# Patient Record
Sex: MALE | Race: WHITE | NOT HISPANIC OR LATINO | Employment: OTHER | URBAN - METROPOLITAN AREA
[De-identification: names, ages, dates, MRNs, and addresses within clinical notes are randomized per-mention and may not be internally consistent; named-entity substitution may affect disease eponyms.]

---

## 2017-01-02 ENCOUNTER — OFFICE VISIT (OUTPATIENT)
Dept: DERMATOLOGY | Facility: CLINIC | Age: 67
End: 2017-01-02
Payer: COMMERCIAL

## 2017-01-02 VITALS — DIASTOLIC BLOOD PRESSURE: 73 MMHG | SYSTOLIC BLOOD PRESSURE: 134 MMHG | HEART RATE: 90 BPM | OXYGEN SATURATION: 94 %

## 2017-01-02 DIAGNOSIS — D48.5 NEOPLASM OF UNCERTAIN BEHAVIOR OF SKIN: Primary | ICD-10-CM

## 2017-01-02 DIAGNOSIS — C44.329 SQUAMOUS CELL CARCINOMA OF SKIN OF LEFT CHEEK: ICD-10-CM

## 2017-01-02 PROCEDURE — 11100 HC BIOPSY SKIN/SUBQ/MUC MEM, SINGLE LESION: CPT | Mod: 59 | Performed by: DERMATOLOGY

## 2017-01-02 PROCEDURE — 88305 TISSUE EXAM BY PATHOLOGIST: CPT | Mod: 90 | Performed by: DERMATOLOGY

## 2017-01-02 PROCEDURE — 17311 MOHS 1 STAGE H/N/HF/G: CPT | Performed by: DERMATOLOGY

## 2017-01-02 PROCEDURE — 99000 SPECIMEN HANDLING OFFICE-LAB: CPT | Performed by: DERMATOLOGY

## 2017-01-02 PROCEDURE — 13132 CMPLX RPR F/C/C/M/N/AX/G/H/F: CPT | Performed by: DERMATOLOGY

## 2017-01-02 NOTE — MR AVS SNAPSHOT
After Visit Summary   1/2/2017    Jarrod Lewis    MRN: [id number]           Patient Information     Date Of Birth          1950        Visit Information        Provider Department      1/2/2017 8:00 AM Tatyaan Sepulveda MD [facility]        Today's Diagnoses     Neoplasm of uncertain behavior of skin    -  1       Care Instructions    Excision Wound Care Instructions  I will experience scar, altered skin color, bleeding, swelling, pain, crusting and redness. I may experience altered sensation. Risks are excessive bleeding, infection, muscle weakness, thick (hypertrophic or keloidal) scar, and recurrence,. A second procedure may be recommended to obtain the best cosmetic or functional result.  Possible complications of any surgical procedure are bleeding, infection, scarring, alteration in skin color and sensation, muscle weakness in the area, wound dehiscence or seperation, or recurrence of the lesion or disease. On occasion, after healing, a secondary procedure or revision may be recommended in order to obtain the best cosmetic or functional result.   After your surgery, a pressure bandage will be placed over the area that has sutures. This will help prevent bleeding. Please follow these instructions, as they will help you to prevent complications as your wound heals.    Do Not use hydrogen peroxide.  The sutures do not need to be removed.  For the First 48 hours After Surgery:  1. Leave the pressure bandage on and keep it dry. If it should come loose, you may retape it, but do not take it off.  2. Relax and take it easy. Do not do any vigorous exercise, heavy lifting, or bending forward. This could cause the wound to bleed.  3. Post-operative pain is usually mild. You may take plain or extra strength Tylenol every 4 hours as needed (do not take more than 4,000mg in one day). Do not take any medicine that contains aspirin, ibuprofen or motrin unless you have been  recommended these by a doctor.  Avoid alcohol and vitamin E as these may increase your tendency to bleed.  4. You may put an ice pack around the bandaged area for 20 minutes every 2-3 hours. This may help reduce swelling, bruising, and pain. Make sure the ice pack is waterproof so that the pressure bandage does not get wet.   5. You may see a small amount of drainage or blood on your pressure bandage. This is normal. However, if drainage or bleeding continues or saturates the bandage, you will need to apply firm pressure over the bandage with a washcloth for 15 minutes. If bleeding continues after applying pressure for 15 minutes then go to the nearest emergency room.  48 Hours After Surgery  Carefully remove the bandage and start daily wound care and dressing changes. You may also now shower and get the wound wet. Wash wound with a mild soap and water.  Use caution when washing the wound. Be gentle and do not let the forceful shower stream hit the wound directly.  PAT dry.  Daily Wound Care:  1. Wash wound with a mild soap and water.  Use caution when washing the wound, be gentle and do not let the forceful shower stream hit the wound directly.  2. PAT DRY.  3. Apply Vaseline (from a new container or tube) over the suture line with a Q-tip. It is very important to keep the wound continuously moist, as wounds heal best in a moist environment.  4.  Keep the site covered until healed, you can cover it with a Telfa (non-stick) dressing and tape or a band-aid.    5. If you are unable to keep wound covered, you must apply Vaseline every 2 - 3 hours (while awake) to ensure it is being kept moist for optimal healing. A dressing overnight is recommended to keep the area moist.   Call Us If:  1. You have pain that is not controlled with Tylenol.  2. You have signs or symptoms of an infection, such as: fever over 100 degrees F, redness, warmth, or foul-smelling or yellow/creamy drainage from the wound.  Who should I call with  questions?    [facility]: [phone]     [facility]: [phone]    For urgent needs outside of business hours call the [facility] at [phone] and ask for the dermatology resident on call        Wound Care After a Biopsy ( left ear)    What is a skin biopsy?  A skin biopsy allows the doctor to examine a very small piece of tissue under the microscope to determine the diagnosis and the best treatment for the skin condition. A local anesthetic (numbing medicine)  is injected with a very small needle into the skin area to be tested. A small piece of skin is taken from the area. Sometimes a suture (stitch) is used.     What are the risks of a skin biopsy?  I will experience scar, bleeding, swelling, pain, crusting and redness. I may experience incomplete removal or recurrence. Risks of this procedure are excessive bleeding, bruising, infection, nerve damage, numbness, thick (hypertrophic or keloidal) scar and non-diagnostic biopsy.    How should I care for my wound for the first 24 hours?    Keep the wound dry and covered for 24 hours    If it bleeds, hold direct pressure on the area for 15 minutes. If bleeding does not stop then go to the emergency room    Avoid strenuous exercise the first 1-2 days or as your doctor instructs you    How should I care for the wound after 24 hours?    After 24 hours, remove the bandage    You may bathe or shower as normal    If you had a scalp biopsy, you can shampoo as usual and can use shower water to clean the biopsy site daily    Clean the wound twice a day with gentle soap and water    Do not scrub, be gentle    Apply white petroleum/Vaseline after cleaning the wound with a cotton swab or a clean finger, and keep the site covered with a Bandaid /bandage. Bandages are not necessary with a scalp biopsy    If you are unable to cover the site with a Bandaid /bandage, re-apply ointment 2-3 times a day to keep  the site moist. Moisture will help with healing    Avoid strenuous activity for first 1-2 days    Avoid lakes, rivers, pools, and oceans until the stitches are removed or the site is healed    How do I clean my wound?    Wash hands for 15 minutes with soap or use hand  before all wound care    Clean the wound with gentle soap and water    Apply white petroleum/Vaseline  to wound after it is clean    Replace the Bandaid /bandage to keep the wound covered for the first few days or as instructed by your doctor    If you had a scalp biopsy, warm shower water to the area on a daily basis should suffice    What should I use to clean my wound?     Cotton-tipped applicators (Qtips )    White petroleum jelly (Vaseline ). Use a clean new container and use Q-tips to apply.    Bandaids   as needed    Gentle soap     How should I care for my wound long term?    Do not get your wound dirty    Keep up with wound care for one week or until the area is healed.    A small scab will form and fall off by itself when the area is completely healed. The area will be red and will become pink in color as it heals. Sun protection is very important for how your scar will turn out. Sunscreen with an SPF 30 or greater is recommended once the area is healed.    You should have some soreness but it should be mild and slowly go away over several days. Talk to your doctor about using tylenol for pain,    When should I call my doctor?  If you have increased:     Pain or swelling    Pus or drainage (clear or slightly yellow drainage is ok)    Temperature over 100F    Spreading redness or warmth around wound    When will I hear about my results?  The biopsy results can take 2-3 weeks to come back. The clinic will call you with the results, send you a [employer] message, or have you schedule a follow-up clinic or phone time to discuss the results. Contact our clinics if you do not hear from us in 3 weeks.     Who should I call with  questions?    [facility]: [phone]     [facility]: [phone]    For urgent needs outside of business hours call the [facility] at [phone] and ask for the dermatology resident on call          Follow-ups after your visit        Your next 10 appointments already scheduled     Jul 17, 2017  2:00 PM   Return Visit with Tatyana Sepulveda MD   [facility]    [address] [place] [id number] [phone]              Who to contact     If you have questions or need follow up information about today's clinic visit or your schedule please contact [facility] directly at [phone].  Normal or non-critical lab and imaging results will be communicated to you by MyChart, letter or phone within 4 business days after the clinic has received the results. If you do not hear from us within 7 days, please contact the clinic through MyChart or phone. If you have a critical or abnormal lab result, we will notify you by phone as soon as possible.  Submit refill requests through [employer] or call your pharmacy and they will forward the refill request to us. Please allow 3 business days for your refill to be completed.          Additional Information About Your Visit        [employer] Information     [employer] gives you secure access to your electronic health record. If you see a primary care provider, you can also send messages to your care team and make appointments. If you have questions, please call your primary care clinic.  If you do not have a primary care provider, please call [phone] and they will assist you.      [employer] is an electronic gateway that provides easy, online access to your medical records. With [employer], you can request a clinic appointment, read your test results, renew a prescription or communicate with your care team.     To access your existing  account, please contact your [facility] or call [phone] for assistance.        Your Vitals Were     Pulse Pulse Oximetry                90 94%           Blood Pressure from Last 3 Encounters:   01/02/17 134/73   12/09/16 152/72   11/08/16 136/80    Weight from Last 3 Encounters:   12/09/16 130.545 kg (287 lb 12.8 oz)   11/08/16 129.729 kg (286 lb)   10/31/16 129.094 kg (284 lb 9.6 oz)              We Performed the Following     Surgical pathology exam        Primary Care Provider Office Phone # Fax #    Adria Cowart,  [phone]       [address]   [facility] [address]        Thank you!     Thank you for choosing [facility]  for your care. Our goal is always to provide you with excellent care. Hearing back from our patients is one way we can continue to improve our services. Please take a few minutes to complete the written survey that you may receive in the mail after your visit with us. Thank you!             Your Updated Medication List - Protect others around you: Learn how to safely use, store and throw away your medicines at www.disposemymeds.org.          This list is accurate as of: 1/2/17 11:41 AM.  Always use your most recent med list.                   Brand Name Dispense Instructions for use    acetaminophen 325 MG tablet    TYLENOL    100 tablet    Take 2 tablets (650 mg) by mouth every 4 hours as needed for mild pain or fever       garlic 150 MG Tabs tablet      Take 150 mg by mouth daily       loratadine 10 MG tablet    CLARITIN     Take 10 mg by mouth daily       Lutein 6 MG Caps      Take 1 tablet by mouth       pantoprazole 40 MG EC tablet    PROTONIX    30 tablet    Take 1 tablet (40 mg) by mouth daily Take 30-60 minutes before a meal.       SAW PALMETTO EXTRACT PO      Take 160 mg by mouth daily       sildenafil 20 MG tablet    REVATIO/VIAGRA    30 tablet    Take 1 tablet (20 mg) by mouth 3  times daily Take one pill daily as needed       triamcinolone 0.1 % ointment    KENALOG    15 g    Apply sparingly to affected area three times daily as needed.       zinc sulfate 220 MG capsule    ZINCATE     Take 1 tablet by mouth

## 2017-01-02 NOTE — NURSING NOTE
Vaseline and pressure dressing applied to Mohs site on left mandible.  Wound care instructions reviewed with patient and AVS provided.  Patient verbalized understanding.  No further questions or concerns at this time.    Skye Harper LPN

## 2017-01-02 NOTE — PATIENT INSTRUCTIONS
Excision Wound Care Instructions  I will experience scar, altered skin color, bleeding, swelling, pain, crusting and redness. I may experience altered sensation. Risks are excessive bleeding, infection, muscle weakness, thick (hypertrophic or keloidal) scar, and recurrence,. A second procedure may be recommended to obtain the best cosmetic or functional result.  Possible complications of any surgical procedure are bleeding, infection, scarring, alteration in skin color and sensation, muscle weakness in the area, wound dehiscence or seperation, or recurrence of the lesion or disease. On occasion, after healing, a secondary procedure or revision may be recommended in order to obtain the best cosmetic or functional result.   After your surgery, a pressure bandage will be placed over the area that has sutures. This will help prevent bleeding. Please follow these instructions, as they will help you to prevent complications as your wound heals.    Do Not use hydrogen peroxide.  The sutures do not need to be removed.  For the First 48 hours After Surgery:  1. Leave the pressure bandage on and keep it dry. If it should come loose, you may retape it, but do not take it off.  2. Relax and take it easy. Do not do any vigorous exercise, heavy lifting, or bending forward. This could cause the wound to bleed.  3. Post-operative pain is usually mild. You may take plain or extra strength Tylenol every 4 hours as needed (do not take more than 4,000mg in one day). Do not take any medicine that contains aspirin, ibuprofen or motrin unless you have been recommended these by a doctor.  Avoid alcohol and vitamin E as these may increase your tendency to bleed.  4. You may put an ice pack around the bandaged area for 20 minutes every 2-3 hours. This may help reduce swelling, bruising, and pain. Make sure the ice pack is waterproof so that the pressure bandage does not get wet.   5. You may see a small amount of drainage or blood on your  pressure bandage. This is normal. However, if drainage or bleeding continues or saturates the bandage, you will need to apply firm pressure over the bandage with a washcloth for 15 minutes. If bleeding continues after applying pressure for 15 minutes then go to the nearest emergency room.  48 Hours After Surgery  Carefully remove the bandage and start daily wound care and dressing changes. You may also now shower and get the wound wet. Wash wound with a mild soap and water.  Use caution when washing the wound. Be gentle and do not let the forceful shower stream hit the wound directly.  PAT dry.  Daily Wound Care:  1. Wash wound with a mild soap and water.  Use caution when washing the wound, be gentle and do not let the forceful shower stream hit the wound directly.  2. PAT DRY.  3. Apply Vaseline (from a new container or tube) over the suture line with a Q-tip. It is very important to keep the wound continuously moist, as wounds heal best in a moist environment.  4.  Keep the site covered until healed, you can cover it with a Telfa (non-stick) dressing and tape or a band-aid.    5. If you are unable to keep wound covered, you must apply Vaseline every 2 - 3 hours (while awake) to ensure it is being kept moist for optimal healing. A dressing overnight is recommended to keep the area moist.   Call Us If:  1. You have pain that is not controlled with Tylenol.  2. You have signs or symptoms of an infection, such as: fever over 100 degrees F, redness, warmth, or foul-smelling or yellow/creamy drainage from the wound.  Who should I call with questions?    [facility]: [phone]     [facility]: [phone]    For urgent needs outside of business hours call the [facility] at [phone] and ask for the dermatology resident on call        Wound Care After a Biopsy ( left ear)    What is a skin biopsy?  A skin biopsy allows the doctor to examine a  very small piece of tissue under the microscope to determine the diagnosis and the best treatment for the skin condition. A local anesthetic (numbing medicine)  is injected with a very small needle into the skin area to be tested. A small piece of skin is taken from the area. Sometimes a suture (stitch) is used.     What are the risks of a skin biopsy?  I will experience scar, bleeding, swelling, pain, crusting and redness. I may experience incomplete removal or recurrence. Risks of this procedure are excessive bleeding, bruising, infection, nerve damage, numbness, thick (hypertrophic or keloidal) scar and non-diagnostic biopsy.    How should I care for my wound for the first 24 hours?    Keep the wound dry and covered for 24 hours    If it bleeds, hold direct pressure on the area for 15 minutes. If bleeding does not stop then go to the emergency room    Avoid strenuous exercise the first 1-2 days or as your doctor instructs you    How should I care for the wound after 24 hours?    After 24 hours, remove the bandage    You may bathe or shower as normal    If you had a scalp biopsy, you can shampoo as usual and can use shower water to clean the biopsy site daily    Clean the wound twice a day with gentle soap and water    Do not scrub, be gentle    Apply white petroleum/Vaseline after cleaning the wound with a cotton swab or a clean finger, and keep the site covered with a Bandaid /bandage. Bandages are not necessary with a scalp biopsy    If you are unable to cover the site with a Bandaid /bandage, re-apply ointment 2-3 times a day to keep the site moist. Moisture will help with healing    Avoid strenuous activity for first 1-2 days    Avoid lakes, rivers, pools, and oceans until the stitches are removed or the site is healed    How do I clean my wound?    Wash hands for 15 minutes with soap or use hand  before all wound care    Clean the wound with gentle soap and water    Apply white petroleum/Vaseline   to wound after it is clean    Replace the Bandaid /bandage to keep the wound covered for the first few days or as instructed by your doctor    If you had a scalp biopsy, warm shower water to the area on a daily basis should suffice    What should I use to clean my wound?     Cotton-tipped applicators (Qtips )    White petroleum jelly (Vaseline ). Use a clean new container and use Q-tips to apply.    Bandaids   as needed    Gentle soap     How should I care for my wound long term?    Do not get your wound dirty    Keep up with wound care for one week or until the area is healed.    A small scab will form and fall off by itself when the area is completely healed. The area will be red and will become pink in color as it heals. Sun protection is very important for how your scar will turn out. Sunscreen with an SPF 30 or greater is recommended once the area is healed.    You should have some soreness but it should be mild and slowly go away over several days. Talk to your doctor about using tylenol for pain,    When should I call my doctor?  If you have increased:     Pain or swelling    Pus or drainage (clear or slightly yellow drainage is ok)    Temperature over 100F    Spreading redness or warmth around wound    When will I hear about my results?  The biopsy results can take 2-3 weeks to come back. The clinic will call you with the results, send you a [employer] message, or have you schedule a follow-up clinic or phone time to discuss the results. Contact our clinics if you do not hear from us in 3 weeks.     Who should I call with questions?    [facility]: [phone]     [facility]: [phone]    For urgent needs outside of business hours call the [facility] at [phone] and ask for the dermatology resident on call

## 2017-01-02 NOTE — NURSING NOTE
Dermatology Rooming Note    Jarrod Lewis's goals for this visit include:   Chief Complaint   Patient presents with     Procedure     Mohs left mandible  SCC        Is a scribe okay for this visit:Not applicable,     Are records needed for this visit(If yes, obtain release of information): No,      Vitals: /73 mmHg  Pulse 90  SpO2 94%    Referring Provider:  No referring provider defined for this encounter.    Skye Harper LPN

## 2017-01-02 NOTE — PROGRESS NOTES
[facility] Mohs Micrographic Surgery Note:  Chief complaint: Squamous cell carcinoma.    Jarrod Lewis is a 66 year old male who was referred by myself for Mohs excision of a Squamous cell carcinoma located on the L angle of mandible. The lesion was excised using Mohs micrographic surgery, and the defect was closed using layered linear closure technique. Please refer to the operative reports. Dissolving sutures were used. The patient was asked to return in 6 months for skin check.    Patient also noted to have a : 6mm red tender hyperkeratotic papule on the L helix, concern for SCC vs CNH.  Shave biopsy:  After discussion of benefits and risks including but not limited to bleeding/bruising, pain/swelling, infection, scar, incomplete removal, nerve damage/numbness, recurrence, and non-diagnostic biopsy, written consent, verbal consent and photographs were obtained. Time-out was performed. The area was cleaned with isopropyl alcohol. 3mL of 1% lidocaine with epinephrine was injected to obtain adequate anesthesia of the lesion on the L helix. A shave biopsy was performed. Hemostasis was achieved with aluminium chloride. Vaseline and a sterile dressing were applied. The patient tolerated the procedure and no complications were noted. The patient was provided with verbal and written post care instructions.     Tatyana Sepulveda MD    [facility]

## 2017-01-04 ENCOUNTER — MYC REFILL (OUTPATIENT)
Dept: FAMILY MEDICINE | Facility: OTHER | Age: 67
End: 2017-01-04

## 2017-01-04 DIAGNOSIS — N52.9 ERECTILE DYSFUNCTION, UNSPECIFIED ERECTILE DYSFUNCTION TYPE: ICD-10-CM

## 2017-01-04 LAB — COPATH REPORT: NORMAL

## 2017-01-04 RX ORDER — SILDENAFIL CITRATE 20 MG/1
20 TABLET ORAL 3 TIMES DAILY
Qty: 30 TABLET | Refills: 1 | Status: SHIPPED | OUTPATIENT
Start: 2017-01-04 | End: 2017-04-18

## 2017-01-04 NOTE — TELEPHONE ENCOUNTER
sildenafil      Last Written Prescription Date: 12/09/16  Last Fill Quantity: 30,  # refills: 0   Last Office Visit with G, [facility] or [facility] prescribing provider: 12/09/16

## 2017-01-04 NOTE — TELEPHONE ENCOUNTER
Message from [employer]:  Original authorizing provider: DO Paris Hernadezdiane Lewis would like a refill of the following medications:  sildenafil (REVATIO/VIAGRA) 20 MG tablet [Adria Cowart DO]    Preferred pharmacy: [id number] - [place], [place] - [address]    Comment:

## 2017-01-05 ENCOUNTER — TELEPHONE (OUTPATIENT)
Dept: DERMATOLOGY | Facility: CLINIC | Age: 67
End: 2017-01-05

## 2017-01-05 NOTE — TELEPHONE ENCOUNTER
Patient notified of results.  He verbalized understanding and agreed with the plan.  Mohs scheduled for 2/13/17.  [facility] previsit information                                                    Jarrod Lewis is a 66 year old male       Patient is being referred to Dr. 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 30 tablet 1     acetaminophen (TYLENOL) 325 MG tablet Take 2 tablets (650 mg) by mouth every 4 hours as needed for mild pain or fever 100 tablet      loratadine (CLARITIN) 10 MG tablet Take 10 mg by mouth daily       garlic 150 MG TABS Take 150 mg by mouth daily       Saw Palmetto, Serenoa repens, (SAW PALMETTO EXTRACT PO) Take 160 mg by mouth daily       triamcinolone (KENALOG) 0.1 % ointment Apply sparingly to affected area three times daily as needed. 15 g 1     Lutein 6 MG CAPS Take 1 tablet by mouth       zinc sulfate (ZINCATE) 220 MG capsule Take 1 tablet by mouth         Do you take the following medications:  Aspirin:  YES - pt notified to hold 2 weeks prior to surgery.  Ibuprofen, Advil, Motrin, Naproxen:   NO  Warfarin/Coumadin:   NO  Vitamin E:   NO  Plavix:   NO    Danielle's warts: NO   Garlic supplement:  NO   Antibiotic Prophylaxis:  NO  Fish Oil: NO    Do you have an ALLERGIC REACTION to any medications:  YES   Penicillins and Zithromax      Past Medical History                                                    Do you currently or have you previously had any of the  following conditions:       HIV/AIDS:  NO    Hepatitis:  NO    Suppressed Immune System:  NO    Autoimmune disorder/Lupus:  NO    Prolonged bleeding or Bleeding disorder:  NO    Fever blisters/herpes, cold sores:  NO    Kidney disease:  NO     CREATININE   Date Value Ref Range Status   10/21/2016 0.95 0.66 - 1.25 mg/dL Final     ]    Implanted device (pacemaker, defibrillator):  NO.      History of artificial or heart valve replacement:  NO.      Skin cancer:  YES.      Previous Mohs surgery: YES.    Organ transplant: NO.      Diabetes: NO    Pregnant: N/A    Keloids or Abnormal scars: NO    Mobility device (wheelchair, transfer difficulty): NO    Tobacco use:  NO.       History   Smoking status     Former Smoker   Smokeless tobacco     Never Used         Reviewed by:  Temi Lewis RN

## 2017-01-05 NOTE — Clinical Note
January 5, 2017        Mr. Jarrod Lewis  [address]  [facility] [address]        Dear Mr. Lewis,    You have an upcoming appointment with Dr. Sepulveda for Mohs surgery.   It is scheduled for 2/13/17 at 8:30 AM.    Your appointment will be at:  [address] [address] [place] [address]      Please arrive 5 minutes prior to your appointment at check in #4 on the 2nd level.    Please bring any insurance changes and updated medication list.  As discussed, you may be here for several hours.  Please eat breakfast before your appointment and pack a cold lunch.  Stop taking aspirin 2 weeks prior to your surgery date.  If you have any questions or concerns, please call [phone].    Sincerely,      Temi Lewis RN

## 2017-01-05 NOTE — TELEPHONE ENCOUNTER
I left a message for patient to call [facility].  Per result note:    Notes Recorded by Ancelmo Sepulveda MD on 1/5/2017 at 9:50 AM  We are writing to inform you of your test results that show:    Skin, helix, left: PLEASE SCHEDULE FOR MOHS  - Squamous cell carcinoma in situ     If you have further questions or concerns, please contact the clinic at [phone].      Sincerely,    Ancelmo Sepulveda MD    Dermatology Department of Dermatology  [facility]              1/2/17 11:34 AM     Copath Report Patient Name: WANG GONZALES   MR#: [id number]   Specimen #: [id number]   Collected: 1/2/2017   Received: 1/3/2017   Reported: 1/4/2017 15:15   Ordering Phy(s): ANCELMO SEPULVEDA     For improved result formatting, select 'View Enhanced Report Format'   under Linked Documents section.     SPECIMEN(S):   Skin, left helix     FINAL DIAGNOSIS:   Skin, helix, left:   - Squamous cell carcinoma in situ - (see description)                   Temi Lewis RN

## 2017-01-09 NOTE — PROGRESS NOTES
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  Because of its size, location and morphology, Mohs surgery was indicated.  After appropriate discussion and informed consent the patient underwent Mohs surgery using the fresh tissue technique as follows:    STAGE I:  The patient was placed on the operating room table.  The area was infiltrated with 1% lidocaine and epinephrine mixed 1:2 with 0.5% Bupivacaine. Using a #15-blade, complete excision was made around the tumor in one section.  Hemostasis was obtained by electrodessication.  A dressing was placed.  Tissue was divided into two tissue blocks which were subsequently mapped, color coded at their margins and processed in the Mohs Laboratory.  Microscopic tumor was not found in the tissue blocks.    With the lesion clear of micrographic tumor, surgery was considered complete.  The defect extended to the subcutaneous fat and measured 1.3 cm x 1.0 cm. After discussion with patient, the defect was reconstructed.      Tatyana Sepulveda MD             Test Performed at:   Dept. of Dermatology   [facility]    [facility]    [address] [place] [address]          [phone]          DERMATOLOGIC SURGERY REPORT    NAME OF PROCEDURE: COMPLEX LAYERED LINEAR CLOSURE    Surgeon: Tatyana Sepulveda  Fellow:    Resident:     DIAGNOSIS:  Status post Mohs micrographic surgical excision of a primary squamous cell carcinoma  FINAL REPAIR LENGTH:   2.8 cm    INDICATIONS:  The patient presents with a 1.3 cm x 1.0 cm defect on the left  angle of mandible following Mohs micrographic surgical excision of a primary squamous cell carcinoma.  The defect extended to the subcutaneous fat.  Based on the nature of the defect, its anatomic location, and availability of adjacent normal skin, the various reconstructive options along with the alternatives and risks of each procedure were discussed with the patient.  A complex layered linear closure was selected as the procedure which would maximally preserve both function and cosmesis and for the following reasons: 1) the defect was widely undermined to 3.5 cm x  1.5 cm; 2) the wound debeveled 360 degrees; 3) multiple deep plication and layered sutures placed; 4) wound size, depth, tension, and location.    OPERATIVE REPORT: Having obtained written informed consent for a complex layered linear closure, the patient was taken to the operating room, placed on the operating room table, and the defect was identified and the borders localized.  The area was cleansed with Hibiclens, sterilely draped, and local anesthesia was achieved with 1% Lidocaine and epinephrine mixed 1:2 with 0.5% Bupivacaine.  Residual devitalized tissue was sharply debrided from the wound bed.  The Mohs defect was debeveled and then the area was extensively and carefully undermined using blunt Metzenbaum scissors.  Hemostasis was obtained with spot electrocautery and ligation of vessels where necessary.  An initial deep plication sutures of 5-0 Vicryl sutures (total of 2 sutures) were placed in the deep, subcutaneous and fascial planes to appose the two margins of the Mohs defect.  Two redundant cutaneous columns were then removed using a #15 blade and the triangulation technique.  Hemostasis was again obtained with spot electrocautery.  The subcutaneous and dermal layers were then closed with additional 5-0 Vicryl sutures (total of 3 sutures).  The epidermis was then carefully approximated along the length of the wound using 5-0 Fast  Absorbing Gut simple running sutures.      Final repair length was 2.8 cm.  Total anesthesia used was 5.5 ml and estimated blood loss was less than 10 ml.  Appropriate post-operative wound ointment was applied to wound surface followed by Telfa and a pressure dressing.  The patient was discharged alert and ambulatory.      Tatyana Sepulveda MD             Test Performed at:   Dept. of Dermatology   [facility]    Dermatologic Surgery & Laser Center    [address] [place] [address]          [phone]

## 2017-02-06 ENCOUNTER — TELEPHONE (OUTPATIENT)
Dept: DERMATOLOGY | Facility: CLINIC | Age: 67
End: 2017-02-06

## 2017-02-06 NOTE — TELEPHONE ENCOUNTER
"Pt called to say he would like to cancel the Mohs surgery he has scheduled form 2/13/17. Pt states that he has too much going on right now. RN asked pt when he would like to reschedule and pt states that he does not know right now but he will call back to reschedule. RN reviewed with pt the type of skin cancer this is and that it is best to treat this type of cancer and pt states \"I know all about it\". RN advised pt to reschedule this mohs surgery for another date but pt declined at this time. Mohs surgery scheduled on 2/13/17 cancelled. RN will route this to Dr. Sepulveda so she is aware....Jyothi Tsang RN    "

## 2017-02-17 ENCOUNTER — TELEPHONE (OUTPATIENT)
Dept: SURGERY | Facility: OTHER | Age: 67
End: 2017-02-17

## 2017-02-17 ENCOUNTER — OFFICE VISIT (OUTPATIENT)
Dept: SURGERY | Facility: OTHER | Age: 67
End: 2017-02-17
Payer: COMMERCIAL

## 2017-02-17 VITALS — TEMPERATURE: 97.5 F | OXYGEN SATURATION: 99 % | HEART RATE: 82 BPM | BODY MASS INDEX: 37.6 KG/M2 | WEIGHT: 285 LBS

## 2017-02-17 DIAGNOSIS — K40.90 RIGHT INGUINAL HERNIA: Primary | ICD-10-CM

## 2017-02-17 PROCEDURE — 99214 OFFICE O/P EST MOD 30 MIN: CPT | Performed by: SPECIALIST

## 2017-02-17 NOTE — NURSING NOTE
"Chief Complaint   Patient presents with     Hernia     Consult     referring Supa       Initial Pulse 82  Temp 97.5  F (36.4  C) (Temporal)  Wt 129.3 kg (285 lb)  SpO2 99%  BMI 37.6 kg/m2 Estimated body mass index is 37.6 kg/(m^2) as calculated from the following:    Height as of 10/21/16: 1.854 m (6' 1\").    Weight as of this encounter: 129.3 kg (285 lb).  Medication Reconciliation: complete    "

## 2017-02-17 NOTE — TELEPHONE ENCOUNTER
Surgery Scheduled    Date of Surgery 03/08/17 Time of Surgery 10:30am  Procedure: Open Right Inguinal Hernia Repair  Hospital/Surgical Facility: [facility]  Surgeon: Dr Bey   Type of Anesthesia Anticipated: LMA  Pre-Op: 03/06/17 with Dr Cowart   Post-Op: 03/16/17 with Dr Bey  Pre-Certification -to be completed  Consent Signed -to be completed  Hospital Stay -same day procedure    Surgery Packet (and/or) Colonscopy Prep (was given/or mailed) to patient. Patient was also instructed to arrive 1 hour(s) prior to surgery.  Patient understood and agrees to the plan.      Guerda Krishna  Specialty

## 2017-02-17 NOTE — MR AVS SNAPSHOT
After Visit Summary   2/17/2017    Jarrod Lewis    MRN: [id number]           Patient Information     Date Of Birth          1950        Visit Information        Provider Department      2/17/2017 10:15 AM Raymundo Bey MD [facility]        Today's Diagnoses     Right inguinal hernia    -  1       Follow-ups after your visit        Your next 10 appointments already scheduled     Mar 06, 2017  7:20 AM CST   Pre-Op physical with Adria Cowart DO   [facility] ([facility])    [address] [facility] [address] [phone]            Mar 16, 2017  9:00 AM CDT   [facility] Visit with Raymundo Bey MD   [facility]    [address] [place] [address] [phone]            Jul 17, 2017  2:00 PM CDT   Return Visit with Tatyana Sepulveda MD   [facility] ([facility])    [address] [place] [address] [phone]              Who to contact     If you have questions or need follow up information about today's clinic visit or your schedule please contact [facility] directly at [phone].  Normal or non-critical lab and imaging results will be communicated to you by MyChart, letter or phone within 4 business days after the clinic has received the results. If you do not hear from us within 7 days, please contact the clinic through MyChart or phone. If you have a critical or abnormal lab result, we will notify you by phone as soon as possible.  Submit refill requests through You[employer]Do or call your pharmacy and they will forward the refill request to us. Please allow 3 business days for your refill to be completed.          Additional Information About Your Visit        [employer] Information     You.Do gives you secure access to your electronic health record. If you see a primary care provider, you can also  send messages to your care team and make appointments. If you have questions, please call your primary care clinic.  If you do not have a primary care provider, please call [phone] and they will assist you.        Care EveryWhere ID     This is your Care EveryWhere ID. This could be used by other organizations to access your [facility] medical records  [id number]        Your Vitals Were     Pulse Temperature Pulse Oximetry BMI (Body Mass Index)          82 97.5  F (36.4  C) (Temporal) 99% 37.6 kg/m2         Blood Pressure from Last 3 Encounters:   01/02/17 134/73   12/09/16 152/72   11/08/16 136/80    Weight from Last 3 Encounters:   02/17/17 285 lb (129.3 kg)   12/09/16 287 lb 12.8 oz (130.5 kg)   11/08/16 286 lb (129.7 kg)              Today, you had the following     No orders found for display       Primary Care Provider Office Phone # Fax #    Adria Uziel Cowart [phone]       [address]   [facility] [address]        Thank you!     Thank you for choosing [facility]  for your care. Our goal is always to provide you with excellent care. Hearing back from our patients is one way we can continue to improve our services. Please take a few minutes to complete the written survey that you may receive in the mail after your visit with us. Thank you!             Your Updated Medication List - Protect others around you: Learn how to safely use, store and throw away your medicines at www.disposemymeds.org.          This list is accurate as of: 2/17/17 10:54 AM.  Always use your most recent med list.                   Brand Name Dispense Instructions for use    acetaminophen 325 MG tablet    TYLENOL    100 tablet    Take 2 tablets (650 mg) by mouth every 4 hours as needed for mild pain or fever       aspirin 81 MG EC tablet      Take 1 tablet (81 mg) by mouth as needed for moderate pain       garlic 150 MG Tabs tablet      Take 150 mg by mouth daily        loratadine 10 MG tablet    CLARITIN     Take 10 mg by mouth daily       Lutein 6 MG Caps      Take 1 tablet by mouth       pantoprazole 40 MG EC tablet    PROTONIX    30 tablet    Take 1 tablet (40 mg) by mouth daily Take 30-60 minutes before a meal.       SAW PALMETTO EXTRACT PO      Take 160 mg by mouth daily       sildenafil 20 MG tablet    REVATIO/VIAGRA    30 tablet    Take 1 tablet (20 mg) by mouth 3 times daily Take one pill daily as needed       triamcinolone 0.1 % ointment    KENALOG    15 g    Apply sparingly to affected area three times daily as needed.       zinc sulfate 220 MG capsule    ZINCATE     Take 1 tablet by mouth

## 2017-02-17 NOTE — PROGRESS NOTES
Consult requested by Dr. Cowart      Reason for consultation - right inguinal hernia    HPI:  Patient is a 66-year-old white male was diagnosed with a right inguinal hernia last July. Do some work issues he was unable to get repaired with Dr. Amanda keller originally. He now presents to me for follow-up of his right. Denies any nausea vomiting fevers chills or pain or obstructive symptoms. He does not smoke.    No past medical history on file.    Past Surgical History   Procedure Laterality Date     Colonoscopy  12/14/2012     Procedure: COLONOSCOPY;  Colonoscopy;  Surgeon: Sylvester Lucas MD;  Location:  GI     Colonoscopy N/A 10/26/2016     Procedure: COMBINED COLONOSCOPY, SINGLE OR MULTIPLE BIOPSY/POLYPECTOMY BY BIOPSY;  Surgeon: Trev Oquendo MD;  Location:  GI     Current Outpatient Prescriptions   Medication     aspirin 81 MG EC tablet     sildenafil (REVATIO/VIAGRA) 20 MG tablet     pantoprazole (PROTONIX) 40 MG enteric coated tablet     acetaminophen (TYLENOL) 325 MG tablet     loratadine (CLARITIN) 10 MG tablet     garlic 150 MG TABS     Saw Palmetto, Serenoa repens, (SAW PALMETTO EXTRACT PO)     triamcinolone (KENALOG) 0.1 % ointment     Lutein 6 MG CAPS     zinc sulfate (ZINCATE) 220 MG capsule     No current facility-administered medications for this visit.         Allergies   Allergen Reactions     Penicillins Unknown     As a child       Zithromax [Azithromycin Dihydrate]      diarrhea     Social History   Substance Use Topics     Smoking status: Former Smoker     Smokeless tobacco: Never Used     Alcohol use 0.0 oz/week     0 Standard drinks or equivalent per week      Comment: occasional     No family history on file.     ROS: 10 point ROS neg other than the symptoms noted above in the HPI.    PE:  B/P: Data Unavailable, T: 97.5, P: 82, R: Data Unavailable  General: well developed, well nourished his who appears stated stated age  HEENT: NC/AT, EOMI, (-)icterus, (-)injection  Neck:  Supple, No JVD  Chest: CTA  Heart: S1, S2, (-)m/r/g  Abd: Soft, non tender, non distended, no masses, reducible RIH.  Ext; Warm, no edema  Psych: AAOx3  Neuro: No focal deficits      Impression/plan:  This is a 66-year-old gentleman with reducible right hernia. I discussed the options of laparoscopic versus open repair. The patient wishes to pursue an open repair at this time. The procedure, risks, benefits and alternatives were discussed and he agrees to proceed. We'll be scheduling near future.    Raymundo Bey MD, FACS

## 2017-02-24 ENCOUNTER — TELEPHONE (OUTPATIENT)
Dept: SURGERY | Facility: CLINIC | Age: 67
End: 2017-02-24

## 2017-02-24 NOTE — TELEPHONE ENCOUNTER
Patient aware and OK with having Dr. Lassiter do consult, surgery and post op. All appts arranged and OR notified.

## 2017-02-24 NOTE — TELEPHONE ENCOUNTER
Lt message for patient to return call.    Dr. Bey will be out date of surgery- reschedule with Dr. Lassiter for same day.

## 2017-03-06 ENCOUNTER — OFFICE VISIT (OUTPATIENT)
Dept: SURGERY | Facility: CLINIC | Age: 67
End: 2017-03-06
Payer: COMMERCIAL

## 2017-03-06 ENCOUNTER — OFFICE VISIT (OUTPATIENT)
Dept: INTERNAL MEDICINE | Facility: CLINIC | Age: 67
End: 2017-03-06
Payer: COMMERCIAL

## 2017-03-06 VITALS
HEART RATE: 87 BPM | TEMPERATURE: 97.7 F | BODY MASS INDEX: 38 KG/M2 | DIASTOLIC BLOOD PRESSURE: 82 MMHG | WEIGHT: 288 LBS | OXYGEN SATURATION: 96 % | SYSTOLIC BLOOD PRESSURE: 138 MMHG

## 2017-03-06 VITALS — BODY MASS INDEX: 38.04 KG/M2 | WEIGHT: 287 LBS | HEIGHT: 73 IN | TEMPERATURE: 96.6 F

## 2017-03-06 DIAGNOSIS — K21.00 GASTROESOPHAGEAL REFLUX DISEASE WITH ESOPHAGITIS: ICD-10-CM

## 2017-03-06 DIAGNOSIS — K21.00 GASTROESOPHAGEAL REFLUX DISEASE WITH ESOPHAGITIS: Primary | ICD-10-CM

## 2017-03-06 DIAGNOSIS — Z01.818 PREOP GENERAL PHYSICAL EXAM: Primary | ICD-10-CM

## 2017-03-06 DIAGNOSIS — K40.20 BILATERAL INGUINAL HERNIA WITHOUT OBSTRUCTION OR GANGRENE, RECURRENCE NOT SPECIFIED: ICD-10-CM

## 2017-03-06 DIAGNOSIS — K40.90 REDUCIBLE RIGHT INGUINAL HERNIA: Primary | ICD-10-CM

## 2017-03-06 LAB
ANION GAP SERPL CALCULATED.3IONS-SCNC: 9 MMOL/L (ref 3–14)
BUN SERPL-MCNC: 23 MG/DL (ref 7–30)
CALCIUM SERPL-MCNC: 8.2 MG/DL (ref 8.5–10.1)
CHLORIDE SERPL-SCNC: 112 MMOL/L (ref 94–109)
CO2 SERPL-SCNC: 24 MMOL/L (ref 20–32)
CREAT SERPL-MCNC: 0.99 MG/DL (ref 0.66–1.25)
ERYTHROCYTE [DISTWIDTH] IN BLOOD BY AUTOMATED COUNT: 15.5 % (ref 10–15)
GFR SERPL CREATININE-BSD FRML MDRD: 76 ML/MIN/1.7M2
GLUCOSE SERPL-MCNC: 96 MG/DL (ref 70–99)
HCT VFR BLD AUTO: 39.5 % (ref 40–53)
HGB BLD-MCNC: 13 G/DL (ref 13.3–17.7)
MCH RBC QN AUTO: 27.3 PG (ref 26.5–33)
MCHC RBC AUTO-ENTMCNC: 32.9 G/DL (ref 31.5–36.5)
MCV RBC AUTO: 83 FL (ref 78–100)
PLATELET # BLD AUTO: 221 10E9/L (ref 150–450)
POTASSIUM SERPL-SCNC: 4.4 MMOL/L (ref 3.4–5.3)
RBC # BLD AUTO: 4.76 10E12/L (ref 4.4–5.9)
SODIUM SERPL-SCNC: 145 MMOL/L (ref 133–144)
WBC # BLD AUTO: 4.3 10E9/L (ref 4–11)

## 2017-03-06 PROCEDURE — 36415 COLL VENOUS BLD VENIPUNCTURE: CPT | Performed by: INTERNAL MEDICINE

## 2017-03-06 PROCEDURE — 99214 OFFICE O/P EST MOD 30 MIN: CPT | Performed by: INTERNAL MEDICINE

## 2017-03-06 PROCEDURE — 85027 COMPLETE CBC AUTOMATED: CPT | Performed by: INTERNAL MEDICINE

## 2017-03-06 PROCEDURE — 99213 OFFICE O/P EST LOW 20 MIN: CPT | Performed by: SURGERY

## 2017-03-06 PROCEDURE — 80048 BASIC METABOLIC PNL TOTAL CA: CPT | Performed by: INTERNAL MEDICINE

## 2017-03-06 RX ORDER — OMEPRAZOLE 40 MG/1
40 CAPSULE, DELAYED RELEASE ORAL DAILY
Qty: 30 CAPSULE | Refills: 1 | Status: SHIPPED | OUTPATIENT
Start: 2017-03-06 | End: 2017-04-18

## 2017-03-06 RX ORDER — CEFAZOLIN SODIUM 1 G/3ML
1 INJECTION, POWDER, FOR SOLUTION INTRAMUSCULAR; INTRAVENOUS SEE ADMIN INSTRUCTIONS
Status: CANCELLED | OUTPATIENT
Start: 2017-03-06

## 2017-03-06 RX ORDER — CEFAZOLIN SODIUM 1 G/50ML
3 SOLUTION INTRAVENOUS
Status: CANCELLED | OUTPATIENT
Start: 2017-03-06

## 2017-03-06 ASSESSMENT — PAIN SCALES - GENERAL: PAINLEVEL: NO PAIN (0)

## 2017-03-06 NOTE — NURSING NOTE
"Chief Complaint   Patient presents with     Consult     right inguinal hernia       Initial Temp 96.6  F (35.9  C) (Skin)  Ht 6' 1\" (1.854 m)  Wt 287 lb (130.2 kg)  BMI 37.87 kg/m2 Estimated body mass index is 37.87 kg/(m^2) as calculated from the following:    Height as of this encounter: 6' 1\" (1.854 m).    Weight as of this encounter: 287 lb (130.2 kg).  Medication Reconciliation: tim ENAMORADO CMA      "

## 2017-03-06 NOTE — MR AVS SNAPSHOT
After Visit Summary   3/6/2017    Jarrod Lewis    MRN: [id number]           Patient Information     Date Of Birth          1950        Visit Information        Provider Department      3/6/2017 8:00 AM Adria Cowart DO [facility]        Today's Diagnoses     Preop general physical exam    -  1    Bilateral inguinal hernia without obstruction or gangrene, recurrence not specified        Gastroesophageal reflux disease with esophagitis          Care Instructions      Before Your Surgery      Call your surgeon if there is any change in your health. This includes signs of a cold or flu (such as a sore throat, runny nose, cough, rash or fever).    Do not smoke, drink alcohol or take over the counter medicine (unless your surgeon or primary care doctor tells you to) for the 24 hours before and after surgery.    If you take prescribed drugs: Follow your doctor s orders about which medicines to take and which to stop until after surgery.    Eating and drinking prior to surgery: follow the instructions from your surgeon    Take a shower or bath the night before surgery. Use the soap your surgeon gave you to gently clean your skin. If you do not have soap from your surgeon, use your regular soap. Do not shave or scrub the surgery site.  Wear clean pajamas and have clean sheets on your bed.         Follow-ups after your visit        Your next 10 appointments already scheduled     Mar 08, 2017   Procedure with Kong Lassiter MD   [facility] ([facility])    [address] [place] [address] [address] [place] [address]   [phone]           From [address]: Exit at [address] on south side of [place]. Turn right on [address]. Turn left at stoplight on [facility] [address] [facility] [address]. [facility] will be in view two blocks ahead            Mar 20, 2017  9:00 AM CDT   Return Visit with Kong Lassiter MD   [facility] ([facility])     [address] [place] [id number] [phone]            Jul 17, 2017  2:00 PM CDT   Return Visit with Tatyana Sepulveda MD   [facility] ([facility])    [address] [address] [place] [id number] [phone]              Who to contact     If you have questions or need follow up information about today's clinic visit or your schedule please contact [facility] directly at [phone].  Normal or non-critical lab and imaging results will be communicated to you by [employer], letter or phone within 4 business days after the clinic has received the results. If you do not hear from us within 7 days, please contact the clinic through [employer] or phone. If you have a critical or abnormal lab result, we will notify you by phone as soon as possible.  Submit refill requests through [employer] or call your pharmacy and they will forward the refill request to us. Please allow 3 business days for your refill to be completed.          Additional Information About Your Visit        [employer] Information     [employer] gives you secure access to your electronic health record. If you see a primary care provider, you can also send messages to your care team and make appointments. If you have questions, please call your primary care clinic.  If you do not have a primary care provider, please call [phone] and they will assist you.        Care EveryWhere ID     This is your Care EveryWhere ID. This could be used by other organizations to access your [facility] medical records  [id number]        Your Vitals Were     Pulse Temperature Pulse Oximetry BMI (Body Mass Index)          87 97.7  F (36.5  C) (Temporal) 96% 38 kg/m2         Blood Pressure from Last 3 Encounters:   03/06/17 138/82   01/02/17 134/73   12/09/16 152/72    Weight from Last 3 Encounters:   03/06/17 287 lb (130.2 kg)   03/06/17 288 lb (130.6 kg)   02/17/17 285 lb (129.3 kg)              We Performed  the Following     **CBC with platelets FUTURE 14d          Today's Medication Changes          These changes are accurate as of: 3/6/17 11:59 PM.  If you have any questions, ask your nurse or doctor.               Start taking these medicines.        Dose/Directions    omeprazole 40 MG capsule   Commonly known as:  priLOSEC   Used for:  Gastroesophageal reflux disease with esophagitis   Started by:  Adria Cowart DO        Dose:  40 mg   Take 1 capsule (40 mg) by mouth daily Take 30-60 minutes before a meal.   Quantity:  30 capsule   Refills:  1            Where to get your medicines      These medications were sent to [facility] - [place], [place] - [address]  [address] [place] [address]     Phone:  [phone]     omeprazole 40 MG capsule                Primary Care Provider Office Phone # Fax #    Adria Cowart [phone]       [address] [place] [address]        Thank you!     Thank you for choosing [facility]  for your care. Our goal is always to provide you with excellent care. Hearing back from our patients is one way we can continue to improve our services. Please take a few minutes to complete the written survey that you may receive in the mail after your visit with us. Thank you!             Your Updated Medication List - Protect others around you: Learn how to safely use, store and throw away your medicines at www.disposemymeds.org.          This list is accurate as of: 3/6/17 11:59 PM.  Always use your most recent med list.                   Brand Name Dispense Instructions for use    acetaminophen 325 MG tablet    TYLENOL    100 tablet    Take 650 mg by mouth every 4 hours as needed for mild pain or fever Reported on 3/6/2017       garlic 150 MG Tabs tablet      Take 150 mg by mouth daily Reported on 3/6/2017       Lutein 6 MG Caps      Take 1 tablet by mouth Reported on 3/6/2017       omeprazole 40 MG  capsule    priLOSEC    30 capsule    Take 1 capsule (40 mg) by mouth daily Take 30-60 minutes before a meal.       SAW PALMETTO EXTRACT PO      Take 160 mg by mouth daily Reported on 3/6/2017       sildenafil 20 MG tablet    REVATIO/VIAGRA    30 tablet    Take 1 tablet (20 mg) by mouth 3 times daily Take one pill daily as needed       triamcinolone 0.1 % ointment    KENALOG    15 g    Apply sparingly to affected area three times daily as needed.       zinc sulfate 220 MG capsule    ZINCATE     Take 1 tablet by mouth Reported on 3/6/2017

## 2017-03-06 NOTE — NURSING NOTE
"Chief Complaint   Patient presents with     Pre-Op Exam       Initial /82 (BP Location: Right arm, Patient Position: Chair, Cuff Size: Adult Large)  Pulse 87  Temp 97.7  F (36.5  C) (Temporal)  Wt 288 lb (130.6 kg)  SpO2 96%  BMI 38 kg/m2 Estimated body mass index is 38 kg/(m^2) as calculated from the following:    Height as of 10/21/16: 6' 1\" (1.854 m).    Weight as of this encounter: 288 lb (130.6 kg).  Medication Reconciliation: complete   Renate ROSS      "

## 2017-03-06 NOTE — PROGRESS NOTES
[address] [address]  [phone]  Dept: [phone]    PRE-OP EVALUATION:  Today's date: 3/6/2017    Jarrod Lewis (: 1950) presents for pre-operative evaluation assessment as requested by Dr. Lassiter.  He requires evaluation and anesthesia risk assessment prior to undergoing surgery/procedure for treatment of Hernia .  Proposed procedure: open right inguinal hernia repair    Date of Surgery/ Procedure: 3/8/17  Time of Surgery/ Procedure: 10:15  Hospital/Surgical Facility: [facility]    Primary Physician: Adria Cowart  Type of Anesthesia Anticipated: General    Patient has a Health Care Directive or Living Will:  NO    1. NO - Do you have a history of heart attack, stroke, stent, bypass or surgery on an artery in the head, neck, heart or legs?  2. YES - DO YOU EVER HAVE ANY PAIN OR DISCOMFORT IN YOUR CHEST? Pain in chest on occasion  3. NO - Do you have a history of  Heart Failure?  4. NO - Are you troubled by shortness of breath when: walking on the level, up a slight hill or at night?  5. NO - Do you currently have a cold, bronchitis or other respiratory infection?  6. NO - Do you have a cough, shortness of breath or wheezing?  7. NO - Do you sometimes get pains in the calves of your legs when you walk?  8. NO - Do you or anyone in your family have previous history of blood clots?  9. NO - Do you or does anyone in your family have a serious bleeding problem such as prolonged bleeding following surgeries or cuts?  10. YES - HAVE YOU EVER HAD PROBLEMS WITH ANEMIA OR BEEN TOLD TO TAKE IRON PILLS? Anemia in past  11. NO - Have you had any abnormal blood loss such as black, tarry or bloody stools, or abnormal vaginal bleeding?  12. NO - Have you ever had a blood transfusion?  13. NO - Have you or any of your relatives ever had problems with anesthesia?  14. NO - Do you have sleep apnea, excessive snoring or daytime drowsiness?  15. NO -  Do you have any prosthetic heart valves?  16. YES - Do you have prosthetic joints? Bilateral knees and right hip  17. NO - Is there any chance that you may be pregnant?      HPI:                                                      Brief HPI related to upcoming procedure: The patient has a history of a right inguinal hernia. It has become more painful in recent weeks. He has not had any problems with acute strangulation or correction at this time but notes that it is becoming difficult for him to walk or even sit up out of a chair. He has been evaluated by his surgeon and it has been determined that surgical intervention is appropriate. He is aware the risks and benefits of the procedure and like to pursue these.      See problem list for active medical problems.  Problems all longstanding and stable, except as noted/documented.  See ROS for pertinent symptoms related to these conditions.                                                                                                  .    MEDICAL HISTORY:                                                      Patient Active Problem List    Diagnosis Date Noted     Advanced directives, counseling/discussion 12/09/2016     Priority: Medium     Info given       GI bleed 10/21/2016     Priority: Medium     Reducible right inguinal hernia 08/11/2016     Priority: Medium     CARDIOVASCULAR SCREENING; LDL GOAL LESS THAN 130 11/26/2012      No past medical history on file.  Past Surgical History   Procedure Laterality Date     Colonoscopy  12/14/2012     Procedure: COLONOSCOPY;  Colonoscopy;  Surgeon: Sylvester Lucas MD;  Location:  GI     Colonoscopy N/A 10/26/2016     Procedure: COMBINED COLONOSCOPY, SINGLE OR MULTIPLE BIOPSY/POLYPECTOMY BY BIOPSY;  Surgeon: Trev Oquendo MD;  Location:  GI     Current Outpatient Prescriptions   Medication Sig Dispense Refill     aspirin 81 MG EC tablet Take 1 tablet (81 mg) by mouth as needed for moderate pain        sildenafil (REVATIO/VIAGRA) 20 MG tablet Take 1 tablet (20 mg) by mouth 3 times daily Take one pill daily as needed 30 tablet 1     acetaminophen (TYLENOL) 325 MG tablet Take 2 tablets (650 mg) by mouth every 4 hours as needed for mild pain or fever 100 tablet      garlic 150 MG TABS Take 150 mg by mouth daily       Saw Palmetto, Serenoa repens, (SAW PALMETTO EXTRACT PO) Take 160 mg by mouth daily       triamcinolone (KENALOG) 0.1 % ointment Apply sparingly to affected area three times daily as needed. 15 g 1     Lutein 6 MG CAPS Take 1 tablet by mouth       zinc sulfate (ZINCATE) 220 MG capsule Take 1 tablet by mouth       OTC products: None, except as noted above    Allergies   Allergen Reactions     Penicillins Unknown     As a child       Zithromax [Azithromycin Dihydrate]      diarrhea      Latex Allergy: NO    Social History   Substance Use Topics     Smoking status: Former Smoker     Smokeless tobacco: Never Used     Alcohol use 0.0 oz/week     0 Standard drinks or equivalent per week      Comment: occasional     History   Drug Use No       REVIEW OF SYSTEMS:                                                    C: NEGATIVE for fever, chills, change in weight  I: NEGATIVE for worrisome rashes, moles or lesions  E: NEGATIVE for vision changes or irritation  E/M: NEGATIVE for ear, mouth and throat problems  R: NEGATIVE for significant cough or SOB  B: NEGATIVE for masses, tenderness or discharge  CV: NEGATIVE for chest pain, palpitations or peripheral edema  GI: NEGATIVE for nausea, abdominal pain,  or change in bowel habits. Patient has had some recurrence of his epigastric discomfort associated with soft changes. He has not been taking the proton pump inhibitor and this will be restarted. We'll    : NEGATIVE for frequency, dysuria, or hematuria  M: NEGATIVE for significant arthralgias or myalgia  N: NEGATIVE for weakness, dizziness or paresthesias  E: NEGATIVE for temperature intolerance, skin/hair changes  H:  NEGATIVE for bleeding problems  P: NEGATIVE for changes in mood or affect    EXAM:                                                    /82 (BP Location: Right arm, Patient Position: Chair, Cuff Size: Adult Large)  Pulse 87  Temp 97.7  F (36.5  C) (Temporal)  Wt 288 lb (130.6 kg)  SpO2 96%  BMI 38 kg/m2    GENERAL APPEARANCE: healthy, alert and no distress     EYES: EOMI,  PERRL     HENT: ear canals and TM's normal and nose and mouth without ulcers or lesions     NECK: no adenopathy, no asymmetry, masses, or scars and thyroid normal to palpation     RESP: lungs clear to auscultation - no rales, rhonchi or wheezes     CV: regular rates and rhythm, normal S1 S2, no S3 or S4 and no murmur, click or rub     ABDOMEN: soft, nontender, without hepatosplenomegaly or masses, aorta normal, bowel sounds normal, liver span normal to percussion and right inguinal hernia is noted on examination. It is reducible but tender.     MS: extremities normal- no gross deformities noted, no evidence of inflammation in joints, FROM in all extremities.     SKIN: no suspicious lesions or rashes     NEURO: Normal strength and tone, sensory exam grossly normal, mentation intact and speech normal     PSYCH: mentation appears normal. and affect normal/bright     LYMPHATICS: No axillary, cervical, or supraclavicular nodes    DIAGNOSTICS:                                                      Component Value Flag Ref Range Units Status Collected Lab   WBC 4.3  4.0 - 11.0 10e9/L Final 03/06/2017 10:38 AM [facility]   RBC Count 4.76  4.4 - 5.9 10e12/L Final 03/06/2017 10:38 AM [facility]   Hemoglobin 13.0 (L) 13.3 - 17.7 g/dL Final 03/06/2017 10:38 AM [facility]   Hematocrit 39.5 (L) 40.0 - 53.0 % Final 03/06/2017 10:38 AM [facility]   MCV 83  78 - 100 fl Final 03/06/2017 10:38 AM [facility]   MCH 27.3  26.5 - 33.0 pg Final 03/06/2017 10:38 AM [facility]   MCHC 32.9  31.5 - 36.5 g/dL Final 03/06/2017 10:38 AM [facility]   RDW 15.5 (H) 10.0 - 15.0 % Final 03/06/2017  10:38 AM [facility]   Platelet Count 221  150 - 450 10e9/L Final 03/06/2017 10:38 AM [facility]     Component Value Flag Ref Range Units Status Collected Lab   Sodium 145 (H) 133 - 144 mmol/L Final 03/06/2017  8:34 AM [facility]   Potassium 4.4  3.4 - 5.3 mmol/L Final 03/06/2017  8:34 AM [facility]   Chloride 112 (H) 94 - 109 mmol/L Final 03/06/2017  8:34 AM [facility]   Carbon Dioxide 24  20 - 32 mmol/L Final 03/06/2017  8:34 AM [facility]   Anion Gap 9  3 - 14 mmol/L Final 03/06/2017  8:34 AM [facility]   Glucose 96  70 - 99 mg/dL Final 03/06/2017  8:34 AM [facility]   Urea Nitrogen 23  7 - 30 mg/dL Final 03/06/2017  8:34 AM [facility]   Creatinine 0.99  0.66 - 1.25 mg/dL Final 03/06/2017  8:34 AM [facility]   GFR Estimate 76  >60 mL/min/1.7m2 Final 03/06/2017  8:34 AM [facility]   Comment:   Non  GFR Calc   GFR Estimate If Black   >60 mL/min/1.7m2 Final 03/06/2017  8:34 AM [facility]   >90    GFR Calc      Calcium 8.2 (L) 8.5 - 10.1 mg/dL Final 03/06/2017  8:34 AM [facility]         IMPRESSION:                                                    Reason for surgery/procedure: Persistent discomfort present secondary to inguinal hernia.  Diagnosis/reason for consult: Perioperative risk assessment and a 66-year-old male with:       The proposed surgical procedure is considered INTERMEDIATE risk.    REVISED CARDIAC RISK INDEX  The patient has the following serious cardiovascular risks for perioperative complications such as (MI, PE, VFib and 3  AV Block):  No serious cardiac risks  INTERPRETATION: 1 risks: Class II (low risk - 0.9% complication rate)    The patient has the following additional risks for perioperative complications:  No identified additional risks    No diagnosis found.    RECOMMENDATIONS:                                                          --Patient is to take all scheduled medications on the day of surgery EXCEPT for modifications listed below.    Patient is instructed not to take his medication morning  of surgery.    Signed Electronically by: Adria Cowart DO    Copy of this evaluation report is provided to requesting physician.    [facility] Preop Guidelines

## 2017-03-06 NOTE — MR AVS SNAPSHOT
After Visit Summary   3/6/2017    Jarrod Lewis    MRN: [id number]           Patient Information     Date Of Birth          1950        Visit Information        Provider Department      3/6/2017 9:00 AM Kong Lassiter MD [facility]         Follow-ups after your visit        Your next 10 appointments already scheduled     Mar 08, 2017   Procedure with Kong Lassiter MD   [facility] ([facility])    [address] [place] [id number]   [phone]           From [address]: Exit at [address] on south side of [place]. Turn right on [address]. Turn left at stoplight on [facility]. [facility] will be in view two blocks ahead            Mar 20, 2017  9:00 AM CDT   Return Visit with Kong Lassiter MD   [facility] ([facility])    [address] [place] [address]   [phone]            Jul 17, 2017  2:00 PM CDT   Return Visit with Tatyana Sepulveda MD   [facility]    [address] [place] [id number] [phone]              Who to contact     If you have questions or need follow up information about today's clinic visit or your schedule please contact [facility] directly at [phone].  Normal or non-critical lab and imaging results will be communicated to you by MyChart, letter or phone within 4 business days after the clinic has received the results. If you do not hear from us within 7 days, please contact the clinic through MyChart or phone. If you have a critical or abnormal lab result, we will notify you by phone as soon as possible.  Submit refill requests through [employer] or call your pharmacy and they will forward the refill request to us. Please allow 3 business days for your refill to be completed.          Additional Information About Your Visit        MyChart Information     [facility]  "gives you secure access to your electronic health record. If you see a primary care provider, you can also send messages to your care team and make appointments. If you have questions, please call your primary care clinic.  If you do not have a primary care provider, please call [phone] and they will assist you.        Care EveryWhere ID     This is your Care EveryWhere ID. This could be used by other organizations to access your [facility] medical records  [id number]        Your Vitals Were     Temperature Height BMI (Body Mass Index)             96.6  F (35.9  C) (Skin) 6' 1\" (1.854 m) 37.87 kg/m2          Blood Pressure from Last 3 Encounters:   03/06/17 138/82   01/02/17 134/73   12/09/16 152/72    Weight from Last 3 Encounters:   03/06/17 287 lb (130.2 kg)   03/06/17 288 lb (130.6 kg)   02/17/17 285 lb (129.3 kg)              Today, you had the following     No orders found for display         Today's Medication Changes          These changes are accurate as of: 3/6/17 10:54 AM.  If you have any questions, ask your nurse or doctor.               Start taking these medicines.        Dose/Directions    omeprazole 40 MG capsule   Commonly known as:  priLOSEC   Used for:  Gastroesophageal reflux disease with esophagitis   Started by:  Adria Cowart DO        Dose:  40 mg   Take 1 capsule (40 mg) by mouth daily Take 30-60 minutes before a meal.   Quantity:  30 capsule   Refills:  1            Where to get your medicines      These medications were sent to [facility] - [place] - [address]  [address] [place] [address]     Phone:  [phone]     omeprazole 40 MG capsule                Primary Care Provider Office Phone # Fax #    Adria Cowart [phone]       [address] [place] [address]        Thank you!     Thank you for choosing [facility]  for your care. Our goal is always to provide you with excellent " care. Hearing back from our patients is one way we can continue to improve our services. Please take a few minutes to complete the written survey that you may receive in the mail after your visit with us. Thank you!             Your Updated Medication List - Protect others around you: Learn how to safely use, store and throw away your medicines at www.disposemymeds.org.          This list is accurate as of: 3/6/17 10:54 AM.  Always use your most recent med list.                   Brand Name Dispense Instructions for use    acetaminophen 325 MG tablet    TYLENOL    100 tablet    Take 650 mg by mouth every 4 hours as needed for mild pain or fever Reported on 3/6/2017       garlic 150 MG Tabs tablet      Take 150 mg by mouth daily Reported on 3/6/2017       Lutein 6 MG Caps      Take 1 tablet by mouth Reported on 3/6/2017       omeprazole 40 MG capsule    priLOSEC    30 capsule    Take 1 capsule (40 mg) by mouth daily Take 30-60 minutes before a meal.       SAW PALMETTO EXTRACT PO      Take 160 mg by mouth daily Reported on 3/6/2017       sildenafil 20 MG tablet    REVATIO/VIAGRA    30 tablet    Take 1 tablet (20 mg) by mouth 3 times daily Take one pill daily as needed       triamcinolone 0.1 % ointment    KENALOG    15 g    Apply sparingly to affected area three times daily as needed.       zinc sulfate 220 MG capsule    ZINCATE     Take 1 tablet by mouth Reported on 3/6/2017

## 2017-03-06 NOTE — PROGRESS NOTES
"HISTORY OF PRESENT ILLNESS:  Jarrod Lewis was seen at the [facility] on 2/17/2007 by Dr. Raymundo Bey.  Patient has had a right inguinal hernia since last July and due to his occupation as a  deferred it until a slow period during the winter.  He presents now for followup and scheduled repair.  The patient denies any obstructive symptoms, any nausea, vomiting or pain after eating.  He does state that he has a reducible right inguinal hernia.  He has not noticed a bulge on the left.  He denies smoking and states that he has never had a prior inguinal hernia repair.      REVIEW OF SYSTEMS:  The patient states that he has had a \"GI bleed\" that has been followed by his primary care.  He was started on omeprazole but has not had an endoscopy and does not have one scheduled.  He had a hemoglobin ordered but it was not done today.  His last hemoglobin is 11.1 in 12/9/2016 and he has one today that was ordered after I saw him and his hemoglobin is now 13.0, slightly under normal at 13.3 grams per deciliter.      PHYSICAL EXAMINATION:   GENERAL:  He is overweight and he has central obesity.   ABDOMEN:  Soft and nontender, nondistended.  He does not have an umbilical hernia.  On his right groin he has a reducible right inguinal hernia that appears to be a direct inguinal hernia that is moderately sized, the left side is free of any inguinal or femoral hernias.      IMPRESSION:  This is a 66-year-old with a right inguinal hernia.  I agree with Dr. Bey's assessment, he is not a good candidate due to the central obesity, for laparoscopic repair and will pursue open with mesh at this time.  We discussed the risks and benefits to include but not limited to chronic groin pain up to 25% postoperatively, postoperative bleeding, swelling, bruising and infection.  He wishes to proceed with surgery.  We will have a signed consent on the day of surgery.  I have asked that we follow up with the hemoglobin today " and it is improved, so we will proceed without any additional intervention.         HELEN VELA MD             D: 2017 12:32   T: 2017 13:06   MT: TC#[id number]      Name:     WANG GONZALES   MRN:      [id number]        Account:      [id number]   :      1950           Visit Date:   2017      Document: [id number]

## 2017-03-07 ENCOUNTER — ANESTHESIA EVENT (OUTPATIENT)
Dept: SURGERY | Facility: CLINIC | Age: 67
End: 2017-03-07
Payer: MEDICARE

## 2017-03-07 ENCOUNTER — TELEPHONE (OUTPATIENT)
Dept: DERMATOLOGY | Facility: CLINIC | Age: 67
End: 2017-03-07

## 2017-03-07 ASSESSMENT — LIFESTYLE VARIABLES: TOBACCO_USE: 1

## 2017-03-07 NOTE — TELEPHONE ENCOUNTER
Patient returned call and stated that he is having hernia surgery and isn't ready to reschedule for Mohs.  I advised him that Dr. Sepulveda is booking into the end of March and early April.  He verbalized understanding.  Temi Lewis RN

## 2017-03-07 NOTE — TELEPHONE ENCOUNTER
Left message for patient to call [facility] in [place] back at [phone] in regards to scheduling for MOHS procedure he cancelled in February on his Left Helix for SCCIS    Samia Waite LPN

## 2017-03-08 ENCOUNTER — SURGERY (OUTPATIENT)
Age: 67
End: 2017-03-08

## 2017-03-08 ENCOUNTER — HOSPITAL ENCOUNTER (OUTPATIENT)
Facility: CLINIC | Age: 67
Discharge: HOME | End: 2017-03-08
Attending: SPECIALIST | Admitting: SPECIALIST
Payer: MEDICARE

## 2017-03-08 ENCOUNTER — ANESTHESIA (OUTPATIENT)
Dept: SURGERY | Facility: CLINIC | Age: 67
End: 2017-03-08
Payer: MEDICARE

## 2017-03-08 VITALS
TEMPERATURE: 97.5 F | SYSTOLIC BLOOD PRESSURE: 132 MMHG | OXYGEN SATURATION: 96 % | DIASTOLIC BLOOD PRESSURE: 78 MMHG | BODY MASS INDEX: 38.04 KG/M2 | RESPIRATION RATE: 14 BRPM | WEIGHT: 287 LBS | HEART RATE: 77 BPM | HEIGHT: 73 IN

## 2017-03-08 DIAGNOSIS — K40.90 REDUCIBLE RIGHT INGUINAL HERNIA: Primary | ICD-10-CM

## 2017-03-08 PROCEDURE — A9270 NON-COVERED ITEM OR SERVICE: HCPCS | Mod: GY | Performed by: SURGERY

## 2017-03-08 PROCEDURE — 27210794 ZZH OR GENERAL SUPPLY STERILE: Performed by: SURGERY

## 2017-03-08 PROCEDURE — 25800025 ZZH RX 258: Performed by: NURSE ANESTHETIST, CERTIFIED REGISTERED

## 2017-03-08 PROCEDURE — 25000125 ZZHC RX 250: Performed by: SURGERY

## 2017-03-08 PROCEDURE — 25000128 H RX IP 250 OP 636: Performed by: SURGERY

## 2017-03-08 PROCEDURE — 25000132 ZZH RX MED GY IP 250 OP 250 PS 637: Mod: GY | Performed by: SURGERY

## 2017-03-08 PROCEDURE — 37000008 ZZH ANESTHESIA TECHNICAL FEE, 1ST 30 MIN: Performed by: SURGERY

## 2017-03-08 PROCEDURE — C1781 MESH (IMPLANTABLE): HCPCS | Performed by: SURGERY

## 2017-03-08 PROCEDURE — 25000125 ZZHC RX 250: Performed by: NURSE ANESTHETIST, CERTIFIED REGISTERED

## 2017-03-08 PROCEDURE — 71000027 ZZH RECOVERY PHASE 2 EACH 15 MINS: Performed by: SURGERY

## 2017-03-08 PROCEDURE — 25000128 H RX IP 250 OP 636: Performed by: NURSE ANESTHETIST, CERTIFIED REGISTERED

## 2017-03-08 PROCEDURE — 40000306 ZZH STATISTIC PRE PROC ASSESS II: Performed by: SURGERY

## 2017-03-08 PROCEDURE — 36000050 ZZH SURGERY LEVEL 2 1ST 30 MIN: Performed by: SURGERY

## 2017-03-08 PROCEDURE — 27110028 ZZH OR GENERAL SUPPLY NON-STERILE: Performed by: SURGERY

## 2017-03-08 PROCEDURE — 37000009 ZZH ANESTHESIA TECHNICAL FEE, EACH ADDTL 15 MIN: Performed by: SURGERY

## 2017-03-08 PROCEDURE — 36000052 ZZH SURGERY LEVEL 2 EA 15 ADDTL MIN: Performed by: SURGERY

## 2017-03-08 PROCEDURE — 71000014 ZZH RECOVERY PHASE 1 LEVEL 2 FIRST HR: Performed by: SURGERY

## 2017-03-08 PROCEDURE — 49505 PRP I/HERN INIT REDUC >5 YR: CPT | Mod: RT | Performed by: SURGERY

## 2017-03-08 PROCEDURE — 25000566 ZZH SEVOFLURANE, EA 15 MIN: Performed by: SURGERY

## 2017-03-08 DEVICE — MESH PROLENE 6X6" SOFT: Type: IMPLANTABLE DEVICE | Status: FUNCTIONAL

## 2017-03-08 RX ORDER — GLYCOPYRROLATE 0.2 MG/ML
INJECTION, SOLUTION INTRAMUSCULAR; INTRAVENOUS PRN
Status: DISCONTINUED | OUTPATIENT
Start: 2017-03-08 | End: 2017-03-08

## 2017-03-08 RX ORDER — LIDOCAINE 40 MG/G
CREAM TOPICAL
Status: DISCONTINUED | OUTPATIENT
Start: 2017-03-08 | End: 2017-03-08 | Stop reason: HOSPADM

## 2017-03-08 RX ORDER — LIDOCAINE HYDROCHLORIDE 20 MG/ML
INJECTION, SOLUTION INFILTRATION; PERINEURAL PRN
Status: DISCONTINUED | OUTPATIENT
Start: 2017-03-08 | End: 2017-03-08

## 2017-03-08 RX ORDER — MEPERIDINE HYDROCHLORIDE 50 MG/ML
12.5 INJECTION INTRAMUSCULAR; INTRAVENOUS; SUBCUTANEOUS
Status: DISCONTINUED | OUTPATIENT
Start: 2017-03-08 | End: 2017-03-08 | Stop reason: HOSPADM

## 2017-03-08 RX ORDER — CEFAZOLIN SODIUM 1 G/3ML
1 INJECTION, POWDER, FOR SOLUTION INTRAMUSCULAR; INTRAVENOUS SEE ADMIN INSTRUCTIONS
Status: DISCONTINUED | OUTPATIENT
Start: 2017-03-08 | End: 2017-03-08 | Stop reason: HOSPADM

## 2017-03-08 RX ORDER — OXYCODONE HYDROCHLORIDE 5 MG/1
5-10 TABLET ORAL
Status: DISCONTINUED | OUTPATIENT
Start: 2017-03-08 | End: 2017-03-08 | Stop reason: HOSPADM

## 2017-03-08 RX ORDER — FENTANYL CITRATE 50 UG/ML
25-50 INJECTION, SOLUTION INTRAMUSCULAR; INTRAVENOUS
Status: DISCONTINUED | OUTPATIENT
Start: 2017-03-08 | End: 2017-03-08 | Stop reason: HOSPADM

## 2017-03-08 RX ORDER — NALOXONE HYDROCHLORIDE 0.4 MG/ML
.1-.4 INJECTION, SOLUTION INTRAMUSCULAR; INTRAVENOUS; SUBCUTANEOUS
Status: DISCONTINUED | OUTPATIENT
Start: 2017-03-08 | End: 2017-03-08 | Stop reason: HOSPADM

## 2017-03-08 RX ORDER — EPHEDRINE SULFATE 50 MG/ML
INJECTION, SOLUTION INTRAMUSCULAR; INTRAVENOUS; SUBCUTANEOUS PRN
Status: DISCONTINUED | OUTPATIENT
Start: 2017-03-08 | End: 2017-03-08

## 2017-03-08 RX ORDER — OXYCODONE HYDROCHLORIDE 5 MG/1
5-10 TABLET ORAL
Qty: 10 TABLET | Refills: 0 | Status: SHIPPED | OUTPATIENT
Start: 2017-03-08 | End: 2017-04-18

## 2017-03-08 RX ORDER — SODIUM CHLORIDE, SODIUM LACTATE, POTASSIUM CHLORIDE, CALCIUM CHLORIDE 600; 310; 30; 20 MG/100ML; MG/100ML; MG/100ML; MG/100ML
INJECTION, SOLUTION INTRAVENOUS CONTINUOUS
Status: DISCONTINUED | OUTPATIENT
Start: 2017-03-08 | End: 2017-03-08 | Stop reason: HOSPADM

## 2017-03-08 RX ORDER — FENTANYL CITRATE 50 UG/ML
INJECTION, SOLUTION INTRAMUSCULAR; INTRAVENOUS PRN
Status: DISCONTINUED | OUTPATIENT
Start: 2017-03-08 | End: 2017-03-08

## 2017-03-08 RX ORDER — DEXAMETHASONE SODIUM PHOSPHATE 10 MG/ML
INJECTION, SOLUTION INTRAMUSCULAR; INTRAVENOUS PRN
Status: DISCONTINUED | OUTPATIENT
Start: 2017-03-08 | End: 2017-03-08

## 2017-03-08 RX ORDER — DIMENHYDRINATE 50 MG/ML
25 INJECTION, SOLUTION INTRAMUSCULAR; INTRAVENOUS
Status: COMPLETED | OUTPATIENT
Start: 2017-03-08 | End: 2017-03-08

## 2017-03-08 RX ORDER — ONDANSETRON 2 MG/ML
INJECTION INTRAMUSCULAR; INTRAVENOUS PRN
Status: DISCONTINUED | OUTPATIENT
Start: 2017-03-08 | End: 2017-03-08

## 2017-03-08 RX ORDER — ACETAMINOPHEN 325 MG/1
650 TABLET ORAL EVERY 4 HOURS PRN
Qty: 100 TABLET | Refills: 0
Start: 2017-03-08 | End: 2017-03-20

## 2017-03-08 RX ORDER — ONDANSETRON 2 MG/ML
4 INJECTION INTRAMUSCULAR; INTRAVENOUS EVERY 30 MIN PRN
Status: DISCONTINUED | OUTPATIENT
Start: 2017-03-08 | End: 2017-03-08 | Stop reason: HOSPADM

## 2017-03-08 RX ORDER — BUPIVACAINE HYDROCHLORIDE AND EPINEPHRINE 2.5; 5 MG/ML; UG/ML
INJECTION, SOLUTION INFILTRATION; PERINEURAL PRN
Status: DISCONTINUED | OUTPATIENT
Start: 2017-03-08 | End: 2017-03-08 | Stop reason: HOSPADM

## 2017-03-08 RX ORDER — ONDANSETRON 4 MG/1
4 TABLET, ORALLY DISINTEGRATING ORAL EVERY 30 MIN PRN
Status: DISCONTINUED | OUTPATIENT
Start: 2017-03-08 | End: 2017-03-08 | Stop reason: HOSPADM

## 2017-03-08 RX ORDER — KETOROLAC TROMETHAMINE 30 MG/ML
INJECTION, SOLUTION INTRAMUSCULAR; INTRAVENOUS PRN
Status: DISCONTINUED | OUTPATIENT
Start: 2017-03-08 | End: 2017-03-08

## 2017-03-08 RX ORDER — PROPOFOL 10 MG/ML
INJECTION, EMULSION INTRAVENOUS PRN
Status: DISCONTINUED | OUTPATIENT
Start: 2017-03-08 | End: 2017-03-08

## 2017-03-08 RX ORDER — CEFAZOLIN SODIUM 1 G/50ML
3 SOLUTION INTRAVENOUS
Status: DISCONTINUED | OUTPATIENT
Start: 2017-03-08 | End: 2017-03-08 | Stop reason: HOSPADM

## 2017-03-08 RX ADMIN — FENTANYL CITRATE 50 MCG: 50 INJECTION, SOLUTION INTRAMUSCULAR; INTRAVENOUS at 10:43

## 2017-03-08 RX ADMIN — ONDANSETRON 4 MG: 2 INJECTION INTRAMUSCULAR; INTRAVENOUS at 10:44

## 2017-03-08 RX ADMIN — SODIUM CHLORIDE, POTASSIUM CHLORIDE, SODIUM LACTATE AND CALCIUM CHLORIDE: 600; 310; 30; 20 INJECTION, SOLUTION INTRAVENOUS at 13:50

## 2017-03-08 RX ADMIN — KETOROLAC TROMETHAMINE 30 MG: 30 INJECTION, SOLUTION INTRAMUSCULAR at 11:51

## 2017-03-08 RX ADMIN — Medication 10 MG: at 11:01

## 2017-03-08 RX ADMIN — OXYCODONE HYDROCHLORIDE 5 MG: 5 TABLET ORAL at 13:47

## 2017-03-08 RX ADMIN — DEXAMETHASONE SODIUM PHOSPHATE 10 MG: 10 INJECTION, SOLUTION INTRAMUSCULAR; INTRAVENOUS at 10:53

## 2017-03-08 RX ADMIN — MIDAZOLAM HYDROCHLORIDE 1 MG: 1 INJECTION, SOLUTION INTRAMUSCULAR; INTRAVENOUS at 10:43

## 2017-03-08 RX ADMIN — PROPOFOL 150 MG: 10 INJECTION, EMULSION INTRAVENOUS at 10:44

## 2017-03-08 RX ADMIN — MIDAZOLAM HYDROCHLORIDE 1 MG: 1 INJECTION, SOLUTION INTRAMUSCULAR; INTRAVENOUS at 10:41

## 2017-03-08 RX ADMIN — HYDROMORPHONE HYDROCHLORIDE 0.5 MG: 1 INJECTION, SOLUTION INTRAMUSCULAR; INTRAVENOUS; SUBCUTANEOUS at 12:26

## 2017-03-08 RX ADMIN — LIDOCAINE HYDROCHLORIDE 0.1 ML: 10 INJECTION, SOLUTION EPIDURAL; INFILTRATION; INTRACAUDAL; PERINEURAL at 10:13

## 2017-03-08 RX ADMIN — Medication 20 ML: at 11:54

## 2017-03-08 RX ADMIN — Medication 10 ML: at 11:57

## 2017-03-08 RX ADMIN — FENTANYL CITRATE 50 MCG: 50 INJECTION, SOLUTION INTRAMUSCULAR; INTRAVENOUS at 11:30

## 2017-03-08 RX ADMIN — Medication 10 MG: at 11:37

## 2017-03-08 RX ADMIN — SODIUM CHLORIDE, POTASSIUM CHLORIDE, SODIUM LACTATE AND CALCIUM CHLORIDE: 600; 310; 30; 20 INJECTION, SOLUTION INTRAVENOUS at 10:13

## 2017-03-08 RX ADMIN — HYDROMORPHONE HYDROCHLORIDE 0.5 MG: 1 INJECTION, SOLUTION INTRAMUSCULAR; INTRAVENOUS; SUBCUTANEOUS at 12:37

## 2017-03-08 RX ADMIN — Medication 5 MG: at 10:57

## 2017-03-08 RX ADMIN — SODIUM CHLORIDE, POTASSIUM CHLORIDE, SODIUM LACTATE AND CALCIUM CHLORIDE: 600; 310; 30; 20 INJECTION, SOLUTION INTRAVENOUS at 11:30

## 2017-03-08 RX ADMIN — Medication 10 MG: at 11:09

## 2017-03-08 RX ADMIN — DIMENHYDRINATE 25 MG: 50 INJECTION, SOLUTION INTRAMUSCULAR; INTRAVENOUS at 13:47

## 2017-03-08 RX ADMIN — FENTANYL CITRATE 50 MCG: 50 INJECTION, SOLUTION INTRAMUSCULAR; INTRAVENOUS at 11:14

## 2017-03-08 RX ADMIN — CEFAZOLIN 3 G: 1 INJECTION, POWDER, FOR SOLUTION INTRAMUSCULAR; INTRAVENOUS at 10:50

## 2017-03-08 RX ADMIN — GLYCOPYRROLATE 0.2 MCG: 0.2 INJECTION, SOLUTION INTRAMUSCULAR; INTRAVENOUS at 11:17

## 2017-03-08 RX ADMIN — LIDOCAINE HYDROCHLORIDE 80 MG: 20 INJECTION, SOLUTION INFILTRATION; PERINEURAL at 10:44

## 2017-03-08 RX ADMIN — FENTANYL CITRATE 50 MCG: 50 INJECTION, SOLUTION INTRAMUSCULAR; INTRAVENOUS at 11:04

## 2017-03-08 RX ADMIN — FENTANYL CITRATE 50 MCG: 50 INJECTION, SOLUTION INTRAMUSCULAR; INTRAVENOUS at 11:02

## 2017-03-08 NOTE — IP AVS SNAPSHOT
MRN:[id number]                      After Visit Summary   3/8/2017    Jarrod Lewis    MRN: [id number]           Thank you!     Thank you for choosing [facility] for your care. Our goal is always to provide you with excellent care. Hearing back from our patients is one way we can continue to improve our services. Please take a few minutes to complete the written survey that you may receive in the mail after you visit with us. Thank you!        Patient Information     Date Of Birth          1950        About your hospital stay     You were admitted on:  March 8, 2017 You last received care in the:  [facility]    You were discharged on:  March 8, 2017       Who to Call     For medical emergencies, please call 911.  For non-urgent questions about your medical care, please call your primary care provider or clinic, [phone]  For questions related to your surgery, please call your surgery clinic        Attending Provider     Provider Specialty    Raymundo Bey MD General Surgery       Primary Care Provider Office Phone # Fax #    Adria Uziel Cowart [phone]       [address]   [facility] [address]        After Care Instructions     Discharge Instructions       Follow up appointment as instructed by Surgeon and or RN            Discharge Instructions       Patient to follow up with appointment in 3-4 weeks    [facility]    Home Care Following Hernia Repair    Benny Lassiter MD    Hernia Type:  Inguinal    Care of the Incision:  Remove gauze dressing (if present) after 48 hours.  If surgical glue was used, keep your incision dry for 24 hours.  Then you may shower, but don t submerge under water for at least 2 weeks.  Gently pat your incision dry with a freshly laundered towel.  Do not touch your incision with bare hands or pick at scabs.  Leave your incision open to air.  Cover it only if clothing rubs or irritates  it.  Activity:  Gradually increase your activity.  Walk short distances several times each day and increase the distance as your strength allows.  To promote circulation, do not cross your legs while sitting.  No strenuous lifting or straining for 2-3 weeks.   Do not lift anything over 10-20 pounds until your doctor approves an increase.  Return to work will be determined by the type of work you do and should be discussed with your physician.  Do not drive or operate equipment while taking prescription pain medicines.  You may drive 1 week after surgery if you have stopped taking prescription pain medicines and are pain-free enough to react quickly and make an emergency stop if necessary.    Diet:  Return to the diet you were on before surgery.  Drink plenty of  water.  Avoid foods that cause constipation.    REMEMBER most prescription pain pills cause constipation.  Walking, extra fluids, and increased fiber (fresh fruits and vegetables, etc.) are natural remedies for constipation.  You can also take mineral oil, 1-2 Tablespoons per day.  If still constipated you may try a stool softener such as Colace or Miralax.    Call Your Physician if You Have:  Redness, increased swelling or cloudy drainage from your incision.  A temperature of more than 101 degrees F.  Worsening pain in your incision not relieved by your prescription pain pills and/or a short rest.  Any questions or concerns about your recovery, please call     Business hours [phone]    After hours [phone] Nurse Advice Line (24 hours a day)    Follow-up Care:  Make an appointment 2-3 weeks after your surgery.  Call [phone]            Ice to affected area       Ice to operative site PRN            No driving or operating machinery        until the day after procedure            No lifting        No lifting over 10 lbs and no strenuous physical activity for 2 weeks            Shower       No shower for 48 hours post procedure. May shower  Postoperative Day (POD) 2                  Your next 10 appointments already scheduled     Mar 20, 2017  9:00 AM CDT   [facility]    [address] [facility] [id number] [phone]            2017  2:00 PM CDT   [facility]    [address] [place] [id number] [phone]              Further instructions from your care team       [facility]   Adult Discharge Orders & Instructions     For 24 hours after surgery    1. Get plenty of rest.  A responsible adult must stay with you for at least 24 hours after you leave the hospital.   2. Do not drive or use heavy equipment.  If you have weakness or tingling, don't drive or use heavy equipment until this feeling goes away.  3. Do not drink alcohol.  4. Avoid strenuous or risky activities.  Ask for help when climbing stairs.   5. You may feel lightheaded.  IF so, sit for a few minutes before standing.  Have someone help you get up.   6. If you have nausea (feel sick to your stomach): Drink only clear liquids such as apple juice, ginger ale, broth or 7-Up.  Rest may also help.  Be sure to drink enough fluids.  Move to a regular diet as you feel able.  7. You may have a slight fever. Call the doctor if your fever is over 100 F (37.7 C) (taken under the tongue) or lasts longer than 24 hours.  8. You may have a dry mouth, a sore throat, muscle aches or trouble sleeping.  These should go away after 24 hours.  9. Do not make important or legal decisions.   Call your doctor for any of the followin.  Signs of infection (fever, growing tenderness at the surgery site, a large amount of drainage or bleeding, severe pain, foul-smelling drainage, redness, swelling).    2. It has been over 8 to 10 hours since surgery and you are still not able to urinate (pass  "water).    3.  Headache for over 24 hours.      To contact a doctor, call ___________[phone]_____________________________    If you wish to take ibuprofen, you may do so after 6:00 p.m.      Pending Results     No orders found from 3/6/2017 to 3/9/2017.            Admission Information     Date & Time Provider Department Dept. Phone    3/8/2017 Raymundo Bey MD [facility] Phase [phone]      Your Vitals Were     Blood Pressure Pulse Temperature Respirations Height Weight    128/73 77 97.5  F (36.4  C) (Axillary) 14 1.854 m (6' 0.99\") 130.2 kg (287 lb)    Pulse Oximetry BMI (Body Mass Index)                96% 37.87 kg/m2          MyChart Information     [employer] gives you secure access to your electronic health record. If you see a primary care provider, you can also send messages to your care team and make appointments. If you have questions, please call your primary care clinic.  If you do not have a primary care provider, please call [phone] and they will assist you.        Care EveryWhere ID     This is your Care EveryWhere ID. This could be used by other organizations to access your [facility] medical records  [id number]           Review of your medicines      START taking        Dose / Directions    oxyCODONE 5 MG IR tablet   Commonly known as:  ROXICODONE   Used for:  Reducible right inguinal hernia   Notes to Patient:  PAIN MEDS ARE CONSTIPATING.  STOOL SOFTENERS ARE RECOMMENDED WHILE TAKING THEM.          Dose:  5-10 mg   Take 1-2 tablets (5-10 mg) by mouth every 3 hours as needed for pain or other (Moderate to Severe)   Quantity:  10 tablet   Refills:  0         CONTINUE these medicines which may have CHANGED, or have new prescriptions. If we are uncertain of the size of tablets/capsules you have at home, strength may be listed as something that might have changed.        Dose / Directions    * acetaminophen 325 MG tablet   Commonly known as:  TYLENOL   This may have changed:  " Another medication with the same name was added. Make sure you understand how and when to take each.        Dose:  650 mg   Take 650 mg by mouth every 4 hours as needed for mild pain or fever Reported on 3/6/2017   Quantity:  100 tablet   Refills:  0       * acetaminophen 325 MG tablet   Commonly known as:  TYLENOL   This may have changed:  You were already taking a medication with the same name, and this prescription was added. Make sure you understand how and when to take each.   Used for:  Reducible right inguinal hernia        Dose:  650 mg   Take 2 tablets (650 mg) by mouth every 4 hours as needed for other (mild pain)   Quantity:  100 tablet   Refills:  0       * Notice:  This list has 2 medication(s) that are the same as other medications prescribed for you. Read the directions carefully, and ask your doctor or other care provider to review them with you.      CONTINUE these medicines which have NOT CHANGED        Dose / Directions    garlic 150 MG Tabs tablet        Dose:  150 mg   Take 150 mg by mouth daily Reported on 3/6/2017   Refills:  0       Lutein 6 MG Caps        Dose:  1 tablet   Take 1 tablet by mouth Reported on 3/6/2017   Refills:  0       omeprazole 40 MG capsule   Commonly known as:  priLOSEC   Used for:  Gastroesophageal reflux disease with esophagitis        Dose:  40 mg   Take 1 capsule (40 mg) by mouth daily Take 30-60 minutes before a meal.   Quantity:  30 capsule   Refills:  1       SAW PALMETTO EXTRACT PO        Dose:  160 mg   Take 160 mg by mouth daily Reported on 3/6/2017   Refills:  0       sildenafil 20 MG tablet   Commonly known as:  REVATIO/VIAGRA   Used for:  Erectile dysfunction, unspecified erectile dysfunction type        Dose:  20 mg   Take 1 tablet (20 mg) by mouth 3 times daily Take one pill daily as needed   Quantity:  30 tablet   Refills:  1       triamcinolone 0.1 % ointment   Commonly known as:  KENALOG   Used for:  Eczema, unspecified eczema        Apply sparingly to  affected area three times daily as needed.   Quantity:  15 g   Refills:  1       zinc sulfate 220 MG capsule   Commonly known as:  ZINCATE        Dose:  1 tablet   Take 1 tablet by mouth Reported on 3/6/2017   Refills:  0            Where to get your medicines      Some of these will need a paper prescription and others can be bought over the counter. Ask your nurse if you have questions.     Bring a paper prescription for each of these medications     oxyCODONE 5 MG IR tablet       You don't need a prescription for these medications     acetaminophen 325 MG tablet                Protect others around you: Learn how to safely use, store and throw away your medicines at www.disposemymeds.org.             Medication List: This is a list of all your medications and when to take them. Check marks below indicate your daily home schedule. Keep this list as a reference.      Medications           Morning Afternoon Evening Bedtime As Needed    * acetaminophen 325 MG tablet   Commonly known as:  TYLENOL   Take 650 mg by mouth every 4 hours as needed for mild pain or fever Reported on 3/6/2017                                * acetaminophen 325 MG tablet   Commonly known as:  TYLENOL   Take 2 tablets (650 mg) by mouth every 4 hours as needed for other (mild pain)                                garlic 150 MG Tabs tablet   Take 150 mg by mouth daily Reported on 3/6/2017                                Lutein 6 MG Caps   Take 1 tablet by mouth Reported on 3/6/2017                                omeprazole 40 MG capsule   Commonly known as:  priLOSEC   Take 1 capsule (40 mg) by mouth daily Take 30-60 minutes before a meal.                                oxyCODONE 5 MG IR tablet   Commonly known as:  ROXICODONE   Take 1-2 tablets (5-10 mg) by mouth every 3 hours as needed for pain or other (Moderate to Severe)   Last time this was given:  1:40 p.m.   Notes to Patient:  PAIN MEDS ARE CONSTIPATING.  STOOL SOFTENERS ARE RECOMMENDED  WHILE TAKING THEM.               MAY TAKE ANOTHER TABLET WITHIN NEXT 3 HOURS, THEN RESUME ACCORDING TO INSTRUCTIONS                   SAW PALMETTO EXTRACT PO   Take 160 mg by mouth daily Reported on 3/6/2017                                sildenafil 20 MG tablet   Commonly known as:  REVATIO/VIAGRA   Take 1 tablet (20 mg) by mouth 3 times daily Take one pill daily as needed                                triamcinolone 0.1 % ointment   Commonly known as:  KENALOG   Apply sparingly to affected area three times daily as needed.                                zinc sulfate 220 MG capsule   Commonly known as:  ZINCATE   Take 1 tablet by mouth Reported on 3/6/2017                                * Notice:  This list has 2 medication(s) that are the same as other medications prescribed for you. Read the directions carefully, and ask your doctor or other care provider to review them with you.              More Information        Having Hernia Surgery: Traditional Repair  Surgery treats a hernia by repairing the weakness in the abdominal wall. An incision is made so the surgeon has a direct view of the hernia. The repair is then done through this incision (open surgery). To repair the defect, muscle and connective tissue may be sewn (sutured) together to make a  traditional repair.  Follow your doctor s advice on how to get ready for the procedure. You can usually go home the same day as your surgery. In some cases, though, you may need to stay in the hospital overnight.  Getting ready for surgery  Your doctor will talk with you about preparing for surgery. Follow all the instructions you re given and be sure to:     Tell your doctor about any medications, supplements, or herbs you take. This includes both prescription and over-the-counter items.    Stop taking aspirin, ibuprofen, naproxen, and other NSAIDs as directed.    Arrange for an adult family member or friend to give you a ride home after surgery.    Stop smoking.  Smoking affects blood flow and can slow healing.    Gently wash the surgical area the night before surgery.    Don t eat or drink after midnight, the night before your surgery. You may need to take some medications with a sip of water, but check with your doctor.  The day of surgery  Arrive at the hospital or surgical center at your scheduled time. You ll be asked to change into a patient gown. You ll then be given an IV to provide fluids and medication. Shortly before surgery, an anesthesiologist will talk with you. He or she will explain the types of anesthesia used to prevent pain during surgery. You will have one or more of the following:    Monitored sedation to make you relaxed and sleepy.    Local anesthesia to numb the surgical site.    Regional anesthesia to numb specific areas of your body.    General anesthesia to let you sleep during surgery.        Risks and complications  Hernia surgery is safe, but does have risks including:    Bleeding    Infection    Anesthesia risks    Mesh complications    Inability to urinate     Numbness or pain in the groin or leg    Risk the hernia will recur    Damage to the testicles or testicular function    Bowel or bladder injury               During the surgery  To make a traditional repair, an incision is made over the hernia. The muscle tissue surrounding the weak area is then sewn together to repair the defect. The incision is closed with stitches, staples, surgical tape, or special glue. This method can be used to repair any type of hernia.  After surgery  When the procedure is over, you ll be taken to the recovery area to rest. Your blood pressure and heart rate will be monitored. You ll also have a bandage over the surgical site. To help reduce discomfort, you ll be given pain medications. You may also be given breathing exercises to keep your lungs clear. Later, you ll be asked to get up and walk. This helps prevent blood clots in the legs. You can go home when  your doctor says you re ready.    9946-1482 [employer]. [address] [place] [address], [place] [address]. All rights reserved. This information is not intended as a substitute for professional medical care. Always follow your healthcare professional's instructions.

## 2017-03-08 NOTE — ANESTHESIA POSTPROCEDURE EVALUATION
Patient: Jarrod Lewis    Procedure(s):  open right inguinal hernia repair with mesh - Wound Class: I-Clean    Diagnosis:right inguinal hernia  Diagnosis Additional Information: No value filed.    Anesthesia Type:  General, LMA    Note:  Anesthesia Post Evaluation    Patient location during evaluation: Phase 2  Patient participation: Able to fully participate in evaluation  Level of consciousness: awake and alert  Pain management: adequate  Airway patency: patent  Cardiovascular status: blood pressure returned to baseline and stable  Respiratory status: room air and spontaneous ventilation  Hydration status: euvolemic  PONV: none     Anesthetic complications: None    Comments: Patient preparing to be discharged home.  Pain is well controlled with PO pain meds at this time.  Patient denies any other concerns        Last vitals:  Vitals:    03/08/17 1330 03/08/17 1345 03/08/17 1350   BP: 141/85 137/84 132/78   Pulse:      Resp:      Temp:      SpO2: 93% 94% 96%         Electronically Signed By: JO Luna CRNA  March 8, 2017  2:37 PM

## 2017-03-08 NOTE — DISCHARGE INSTRUCTIONS
[facility]   Adult Discharge Orders & Instructions     For 24 hours after surgery    1. Get plenty of rest.  A responsible adult must stay with you for at least 24 hours after you leave the hospital.   2. Do not drive or use heavy equipment.  If you have weakness or tingling, don't drive or use heavy equipment until this feeling goes away.  3. Do not drink alcohol.  4. Avoid strenuous or risky activities.  Ask for help when climbing stairs.   5. You may feel lightheaded.  IF so, sit for a few minutes before standing.  Have someone help you get up.   6. If you have nausea (feel sick to your stomach): Drink only clear liquids such as apple juice, ginger ale, broth or 7-Up.  Rest may also help.  Be sure to drink enough fluids.  Move to a regular diet as you feel able.  7. You may have a slight fever. Call the doctor if your fever is over 100 F (37.7 C) (taken under the tongue) or lasts longer than 24 hours.  8. You may have a dry mouth, a sore throat, muscle aches or trouble sleeping.  These should go away after 24 hours.  9. Do not make important or legal decisions.   Call your doctor for any of the followin.  Signs of infection (fever, growing tenderness at the surgery site, a large amount of drainage or bleeding, severe pain, foul-smelling drainage, redness, swelling).    2. It has been over 8 to 10 hours since surgery and you are still not able to urinate (pass water).    3.  Headache for over 24 hours.      To contact a doctor, call ___________[phone]_____________________________    If you wish to take ibuprofen, you may do so after 6:00 p.m.

## 2017-03-08 NOTE — ANESTHESIA CARE TRANSFER NOTE
Patient: Jarrod Lewis    Procedure(s):  open right inguinal hernia repair with mesh - Wound Class: I-Clean    Diagnosis: right inguinal hernia  Diagnosis Additional Information: No value filed.    Anesthesia Type:   General, LMA     Note:  Airway :Face Mask  Patient transferred to:PACU        Vitals: (Last set prior to Anesthesia Care Transfer)    CRNA VITALS  3/8/2017 1144 - 3/8/2017 1219      3/8/2017             Pulse: 86    SpO2: 97 %                Electronically Signed By: JO Luna CRNA  March 8, 2017  12:19 PM

## 2017-03-08 NOTE — OP NOTE
OPERATIVE NOTE  [facility] SURGERY    DATE:  3/8/2017    SURGEON:  Benny Lassiter MD      ASSISTANT:Barbara Webb, Medical student     PREOPERATIVE DIAGNOSIS:  Right inguinal hernia.    POSTOPERATIVE DIAGNOSIS:   Right inguinal hernia.    OPERATION:    1. Right inguinal hernia repair with Mesh.    ANESTHESIA:  General anesthesia      INDICATIONS FOR PROCEDURE:  Jarrod Lewis is a 66 year old male who noted a bulge in his RIGHT groin.  This was causing him discomfort, and thus he is brought to the operating room for repair of his RIGHT inguinal hernia.    FINDINGS:  Right indirect inguinal hernia    PROCEDURE:  The patient was brought to the operating room and placed in the supine position upon the operating table.  A timeout was taken to assure proper site, procedure and patient with all in the operating room.  The patient's RIGHT  groin was prepped and draped in the usual sterile fashion.  An inguinal nerve block was performed by injecting local anesthetic medial to the anterior superior iliac spine.  Local anesthesetic was instilled beneath the skin along the marked incision line (using normal anatomical markers), and then an incision was made in the left groin.  Dissection was carried through the subcutaneous tissue and Luz Elena's fascia until the fascia of the external oblique was identified.  Local anesthesia was instilled beneath the external oblique fascia, and this was opened in the direction of its fibers with a nick incision and opened medial and lateral.  The under edges were bluntly cleared, and the cord structures were dissected circumferentially at the level of the pubic tubercle.  Dissection was then undertaken on the cord structures, and we identified an indirect hernia.  The cord structures were skeletonized. The vas deferens and testicular vessels were clearly identified.  The floor of the inguinal canal was then inspected and was found to be weak. To prevent post operative complications with pain  the ilioinguinal nerve was removed and ligated with 2-0 silk.  Next, a piece of 3X4 mesh was opened on the operative field and trimmed to appropriate size.  A keyhole was made in this mesh to accommodate the spermatic cord.  The mesh was then laid on the floor of the inguinal canal, secured medially to the pubic tubercle, inferiorly to the shelving edge of the inguinal ligament, and superiorly to the internal oblique.  The tails of the mesh were wrapped around the spermatic cord and laid laterally beneath the external oblique fascia.  The mesh was secured around the spermatic cord, thus recreating the internal ring.  At this point, we were satisfied with our repair of the inguinal hernia.  Hemostasis was adequate.  The fascia of the external oblique was closed using running 3-0 Vicryl suture.  Luz Elena's was closed using interrupted 3-0 Vicryl sutures.  Skin was closed with 4-0 Monocryl.  The patient tolerated the procedure well.  There were no complications.  The patient was transferred to postanesthesia recovery in stable condition.    Benny Lassiter MD    [facility] Surgery

## 2017-03-08 NOTE — BRIEF OP NOTE
[facility]   Brief Operative Note    Pre-operative diagnosis: right inguinal hernia   Post-operative diagnosis s/p right inguinal hernia repair     Procedure: Procedure(s):  open right inguinal hernia repair with mesh - Wound Class: I-Clean   Surgeon(s):  Anesthesia  Anesthesia  First Assitant Surgeon(s) and Role:      Kong Lassiter MD - Primary  General  CRNA Independent: Ellen Haney APRN CRNA  Barbara Webb, Medical Student   Estimated blood loss:  Total IVG  Urine Output  Drains 1 cc    12:01 PM  800 cc  NM  none  None    Specimens: none   Findings:  Complications  Condition  Comments Right inguinal hernia  None  Stable   See dictated operative report for full details

## 2017-03-08 NOTE — IP AVS SNAPSHOT
[facility] Phase II    [address]     [place] [address]    Phone:  [phone]                                       After Visit Summary   3/8/2017    Jarrod Lewis    MRN: [id number]           After Visit Summary Signature Page     I have received my discharge instructions, and my questions have been answered. I have discussed any challenges I see with this plan with the nurse or doctor.    ..........................................................................................................................................  Patient/Patient Representative Signature      ..........................................................................................................................................  Patient Representative Print Name and Relationship to Patient    ..................................................               ................................................  Date                                            Time    ..........................................................................................................................................  Reviewed by Signature/Title    ...................................................              ..............................................  Date                                                            Time

## 2017-03-08 NOTE — PROVIDER NOTIFICATION
Pt pale and diaphoretic.  States he feels dizzy.  Nauseated.  Cool air attached to gown.  Head lowered.  Dramamine and narcotic given.  Pt allowed to rest.  aris cdi.  Denies pain other than at inc site.  sats dropped after dramamine.  o2 at 3l per nc placed.  sats in high 90S with o2.

## 2017-03-08 NOTE — ANESTHESIA PREPROCEDURE EVALUATION
Anesthesia Evaluation     . Pt has had prior anesthetic. Type: MAC      ROS/MED HX    ENT/Pulmonary:     (+)tobacco use, Past use , . .    Neurologic:  - neg neurologic ROS     Cardiovascular:     (+) Dyslipidemia, ----. : . . . :. . Previous cardiac testing Echodate:10/8/15results:date: results:ECG reviewed date:10/7/15 results:Normal sinus rhythm with left axis deviationCath date: 10/7/15 results:Normal cath EF 60%          METS/Exercise Tolerance:     Hematologic:     (+) Other Hematologic Disorder-history of a GI bleed      Musculoskeletal:         GI/Hepatic:  - neg GI/hepatic ROS      (-) GERD   Renal/Genitourinary:  - ROS Renal section negative   (+) Pt has no history of transplant,       Endo:         Psychiatric:  - neg psychiatric ROS       Infectious Disease:  - neg infectious disease ROS       Malignancy:      - no malignancy   Other:    (+) No chance of pregnancy C-spine cleared: N/A, no H/O Chronic Pain,  - neg other ROS           Physical Exam  Normal systems: cardiovascular, pulmonary and dental    Airway   Mallampati: II  TM distance: >3 FB  Neck ROM: full    Dental     Cardiovascular   Rhythm and rate: regular and normal      Pulmonary    breath sounds clear to auscultation                    Anesthesia Plan      History & Physical Review  History and physical reviewed and following examination; no interval change.    ASA Status:  2 .    NPO Status:  > 8 hours    Plan for General and LMA with Intravenous and Propofol induction. Maintenance will be Balanced.    PONV prophylaxis:  Ondansetron (or other 5HT-3) and Dexamethasone or Solumedrol       Postoperative Care  Postoperative pain management:  IV analgesics and Oral pain medications.      Consents  Anesthetic plan, risks, benefits and alternatives discussed with:  Patient..                          .

## 2017-03-09 NOTE — PROGRESS NOTES
Dear Jarrod, your recent test results are attached.   Your calcium has improved.  You were minimally dehydrated at the time of the blood draw.    Continue your medications as current.          Feel free to contact me via the office or My Chart if you have any questions regarding the above.    Sincerely,  DO TIMMY Major

## 2017-03-09 NOTE — PROGRESS NOTES
Dear Jarrod, your recent test results are attached.   Your hemoglobin has improved from 11.1 up to 13.0. This is good.              Feel free to contact me via the office or My Chart if you have any questions regarding the above.    Sincerely,  Adria Cowart,  FACOI

## 2017-03-17 ENCOUNTER — TELEPHONE (OUTPATIENT)
Dept: INTERNAL MEDICINE | Facility: CLINIC | Age: 67
End: 2017-03-17

## 2017-03-17 NOTE — TELEPHONE ENCOUNTER
Reason for Call:  Same Day Appointment, Requested Provider:  Adria Cowart D.O.    PCP: Adria Cowart    Reason for visit: Upper GI issues    Duration of symptoms: ongoing    Have you been treated for this in the past? Yes    Additional comments: pt has an appt at 9a with surg in [facility]    Can we leave a detailed message on this number? YES    Phone number patient can be reached at:     Best Time:     Call taken on 3/17/2017 at 9:55 AM by Liz Garza

## 2017-03-17 NOTE — TELEPHONE ENCOUNTER
"Writer spoke with patient to see if he was ready to reschedule for his MOHS procedure of SCCIS on Left [place]. Patient states that he is dealing with \"other issues\" and therefore is not ready to reschedule at this time. Writer informed patient we would contact him again in a few weeks to see if he is ready to reschedule yet.    Samia Waite, MILES    "

## 2017-03-20 ENCOUNTER — OFFICE VISIT (OUTPATIENT)
Dept: INTERNAL MEDICINE | Facility: CLINIC | Age: 67
End: 2017-03-20
Payer: COMMERCIAL

## 2017-03-20 ENCOUNTER — OFFICE VISIT (OUTPATIENT)
Dept: SURGERY | Facility: CLINIC | Age: 67
End: 2017-03-20
Payer: COMMERCIAL

## 2017-03-20 VITALS
TEMPERATURE: 97.6 F | DIASTOLIC BLOOD PRESSURE: 86 MMHG | HEART RATE: 86 BPM | WEIGHT: 282 LBS | OXYGEN SATURATION: 96 % | BODY MASS INDEX: 37.21 KG/M2 | SYSTOLIC BLOOD PRESSURE: 152 MMHG

## 2017-03-20 VITALS — DIASTOLIC BLOOD PRESSURE: 76 MMHG | SYSTOLIC BLOOD PRESSURE: 140 MMHG | HEART RATE: 70 BPM | TEMPERATURE: 97.4 F

## 2017-03-20 DIAGNOSIS — Z87.19 S/P INGUINAL HERNIA REPAIR: Primary | ICD-10-CM

## 2017-03-20 DIAGNOSIS — K21.00 GASTROESOPHAGEAL REFLUX DISEASE WITH ESOPHAGITIS: Primary | ICD-10-CM

## 2017-03-20 DIAGNOSIS — Z98.890 S/P INGUINAL HERNIA REPAIR: Primary | ICD-10-CM

## 2017-03-20 DIAGNOSIS — Z23 NEED FOR PNEUMOCOCCAL VACCINE: ICD-10-CM

## 2017-03-20 PROCEDURE — 90670 PCV13 VACCINE IM: CPT | Performed by: INTERNAL MEDICINE

## 2017-03-20 PROCEDURE — 99024 POSTOP FOLLOW-UP VISIT: CPT | Performed by: SURGERY

## 2017-03-20 PROCEDURE — G0009 ADMIN PNEUMOCOCCAL VACCINE: HCPCS | Performed by: INTERNAL MEDICINE

## 2017-03-20 PROCEDURE — 99213 OFFICE O/P EST LOW 20 MIN: CPT | Mod: 25 | Performed by: INTERNAL MEDICINE

## 2017-03-20 RX ORDER — SUCRALFATE 1 G/1
1 TABLET ORAL 4 TIMES DAILY
Qty: 80 TABLET | Refills: 1 | Status: SHIPPED | OUTPATIENT
Start: 2017-03-20 | End: 2017-04-18

## 2017-03-20 ASSESSMENT — PAIN SCALES - GENERAL: PAINLEVEL: MILD PAIN (2)

## 2017-03-20 NOTE — MR AVS SNAPSHOT
After Visit Summary   3/20/2017    Jarrod Lewis    MRN: [id number]           Patient Information     Date Of Birth          1950        Visit Information        Provider Department      3/20/2017 3:20 PM Adria Cowart DO [facility]        Today's Diagnoses     Gastroesophageal reflux disease with esophagitis    -  1    Need for pneumococcal vaccine           Follow-ups after your visit        Your next 10 appointments already scheduled     Jul 17, 2017  2:00 PM CDT   Return Visit with Tatyana Sepulveda MD   [facility]    [address] [place] [address] [phone]              Who to contact     If you have questions or need follow up information about today's clinic visit or your schedule please contact [facility] directly at [phone].  Normal or non-critical lab and imaging results will be communicated to you by MyChart, letter or phone within 4 business days after the clinic has received the results. If you do not hear from us within 7 days, please contact the clinic through [employer] or phone. If you have a critical or abnormal lab result, we will notify you by phone as soon as possible.  Submit refill requests through [employer] or call your pharmacy and they will forward the refill request to us. Please allow 3 business days for your refill to be completed.          Additional Information About Your Visit        MyChart Information     [employer] gives you secure access to your electronic health record. If you see a primary care provider, you can also send messages to your care team and make appointments. If you have questions, please call your primary care clinic.  If you do not have a primary care provider, please call [phone] and they will assist you.        Care EveryWhere ID     This is your Care EveryWhere ID. This could be used by other organizations to access your  [facility] medical records  [id number]        Your Vitals Were     Pulse Temperature Pulse Oximetry BMI (Body Mass Index)          86 97.6  F (36.4  C) (Temporal) 96% 37.21 kg/m2         Blood Pressure from Last 3 Encounters:   03/20/17 152/86   03/20/17 140/76   03/08/17 132/78    Weight from Last 3 Encounters:   03/20/17 282 lb (127.9 kg)   03/08/17 287 lb (130.2 kg)   03/06/17 287 lb (130.2 kg)              We Performed the Following     ADMIN 1st VACCINE     PNEUMOCOCCAL CONJ VACCINE 13 VALENT IM (PREVNAR 13)          Today's Medication Changes          These changes are accurate as of: 3/20/17 11:59 PM.  If you have any questions, ask your nurse or doctor.               Start taking these medicines.        Dose/Directions    sucralfate 1 GM tablet   Commonly known as:  CARAFATE   Used for:  Gastroesophageal reflux disease with esophagitis   Started by:  Adria Cowart DO        Dose:  1 g   Take 1 tablet (1 g) by mouth 4 times daily   Quantity:  80 tablet   Refills:  1            Where to get your medicines      These medications were sent to [facility] [address]   [facility] [address] [facility] [address]     Phone:  [phone]     sucralfate 1 GM tablet                Primary Care Provider Office Phone # Fax #    Adria Cowart [phone]       [address]   [facility] [address]        Thank you!     Thank you for choosing [facility]  for your care. Our goal is always to provide you with excellent care. Hearing back from our patients is one way we can continue to improve our services. Please take a few minutes to complete the written survey that you may receive in the mail after your visit with us. Thank you!             Your Updated Medication List - Protect others around you: Learn how to safely use, store and throw away your medicines at www.disposemymeds.org.          This list is  accurate as of: 3/20/17 11:59 PM.  Always use your most recent med list.                   Brand Name Dispense Instructions for use    acetaminophen 325 MG tablet    TYLENOL    100 tablet    Take 650 mg by mouth every 4 hours as needed for mild pain or fever Reported on 3/6/2017       garlic 150 MG Tabs tablet      Take 150 mg by mouth daily Reported on 3/20/2017       Lutein 6 MG Caps      Take 1 tablet by mouth Reported on 3/20/2017       omeprazole 40 MG capsule    priLOSEC    30 capsule    Take 1 capsule (40 mg) by mouth daily Take 30-60 minutes before a meal.       oxyCODONE 5 MG IR tablet    ROXICODONE    10 tablet    Take 1-2 tablets (5-10 mg) by mouth every 3 hours as needed for pain or other (Moderate to Severe)       SAW PALMETTO EXTRACT PO      Take 160 mg by mouth daily Reported on 3/20/2017       sildenafil 20 MG tablet    REVATIO/VIAGRA    30 tablet    Take 1 tablet (20 mg) by mouth 3 times daily Take one pill daily as needed       sucralfate 1 GM tablet    CARAFATE    80 tablet    Take 1 tablet (1 g) by mouth 4 times daily       triamcinolone 0.1 % ointment    KENALOG    15 g    Apply sparingly to affected area three times daily as needed.       zinc sulfate 220 MG capsule    ZINCATE     Take 1 tablet by mouth Reported on 3/6/2017

## 2017-03-20 NOTE — NURSING NOTE
"Chief Complaint   Patient presents with     Chest Pain     f/u       Initial /86 (BP Location: Right arm, Patient Position: Chair, Cuff Size: Adult Large)  Pulse 86  Temp 97.6  F (36.4  C) (Temporal)  Wt 282 lb (127.9 kg)  SpO2 96%  BMI 37.21 kg/m2 Estimated body mass index is 37.21 kg/(m^2) as calculated from the following:    Height as of 3/8/17: 6' 0.99\" (1.854 m).    Weight as of this encounter: 282 lb (127.9 kg).  Medication Reconciliation: complete   Renate ROSS      "

## 2017-03-20 NOTE — NURSING NOTE
"Chief Complaint   Patient presents with     Surgical Followup     open right inguinal hernia repair with mesh done on 03/08/17       Initial /76 (BP Location: Right arm, Patient Position: Chair, Cuff Size: Adult Regular)  Pulse 70  Temp 97.4  F (36.3  C) (Skin) Estimated body mass index is 37.87 kg/(m^2) as calculated from the following:    Height as of 3/8/17: 6' 0.99\" (1.854 m).    Weight as of 3/8/17: 287 lb (130.2 kg).  Medication Reconciliation: complete   Miguel Ángel ENAMORADO CMA      "

## 2017-03-20 NOTE — PROGRESS NOTES
Chief Complaint   Patient presents with     Chest Pain     f/u                     Chief Complaint           The patient is a pleasant 66-year-old gentleman who presents today with chest pain.  He notes that the pain is not associated with any diaphoresis or sweating.  It is associated with eating.  A short time after he eats, the symptoms seem to worsen.  If he drinks something cold, the pain goes away for a short period of time. He's had no radiation to the shoulders or arm.  He has had no company diaphoresis.  His past history is positive for recent inguinal hernia repair which was unremarkable. He is currently not taking any nonsteroidal anti-inflammatory's had been recently taken some oxycodone for postoperative pain.  This was a very brief course.  He is currently taking omeprazole this does seem to help incompletely. In October 2016 he was hospitalized for acute upper gastrointestinal bleed.                       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     Surgical History:   has a past surgical history that includes Colonoscopy (12/14/2012); Colonoscopy (N/A, 10/26/2016); Mohs micrographic procedure (Left, 01/02/2017); joint replacement, hip rt/lt (Right, 02/03/2012); TOTAL KNEE ARTHROPLASTY (Bilateral); and Herniorrhaphy inguinal (Right, 3/8/2017).     Social History:   reports that he quit smoking about 5 years ago. His smoking use included Pipe. He has never used smokeless tobacco. He reports that he drinks alcohol. He reports that he does not use illicit drugs.     Family History:  family history includes Stomach Cancer in his father.            MEDICATIONS  Current Outpatient Prescriptions   Medication Sig Dispense Refill     sucralfate (CARAFATE) 1 GM tablet Take 1 tablet (1 g) by mouth 4 times daily 80 tablet 1     oxyCODONE (ROXICODONE) 5 MG IR tablet Take 1-2 tablets (5-10  mg) by mouth every 3 hours as needed for pain or other (Moderate to Severe) 10 tablet 0     omeprazole (PRILOSEC) 40 MG capsule Take 1 capsule (40 mg) by mouth daily Take 30-60 minutes before a meal. 30 capsule 1     sildenafil (REVATIO/VIAGRA) 20 MG tablet Take 1 tablet (20 mg) by mouth 3 times daily Take one pill daily as needed 30 tablet 1     acetaminophen (TYLENOL) 325 MG tablet Take 650 mg by mouth every 4 hours as needed for mild pain or fever Reported on 3/6/2017 100 tablet      garlic 150 MG TABS Take 150 mg by mouth daily Reported on 3/20/2017       Saw Palmetto, Serenoa repens, (SAW PALMETTO EXTRACT PO) Take 160 mg by mouth daily Reported on 3/20/2017       triamcinolone (KENALOG) 0.1 % ointment Apply sparingly to affected area three times daily as needed. 15 g 1     Lutein 6 MG CAPS Take 1 tablet by mouth Reported on 3/20/2017       zinc sulfate (ZINCATE) 220 MG capsule Take 1 tablet by mouth Reported on 3/6/2017           --------------------------------------------------------------------------------------------------------------------                  Review of Systems     LUNGS: Pt denies: cough,excess sputum, hemoptysis, or shortness of breath.   HEART: Pt denies: chest pain, arrythmia, syncope, tachy or bradyarrhythmia.   GI: Pt denies: nausea, vomitting, diarrhea, constipation, melena, or hematochezia. Note that the discomfort in his chest seems to be slightly worse when he reclines.   NEURO: Pt denies: seizures, strokes, diplopia, weakness, paraesthesias, or paralysis.                        Examination       Vital Signs:  B/P: 152/86, T: 97.6, P: 86, R: Data Unavailable, BMI: Body mass index is 37.21 kg/(m^2).   Constitutional: The patient appears to be in no acute distress. The patient appears to be adequately hydrated. No acute respiratory or hemodynamic distress is noted at this time.   LUNGS: clear bilaterally, airflow is brisk, no intercostal retraction or stridor is noted. No coughing is  noted during visit.   HEART:  regular without rubs, clicks, gallops, or murmurs. PMI is nondisplaced. Upstrokes are brisk. S1,S2 are heard.   GI: Abdomen is soft, without rebound, guarding.. Bowel sounds are appropriate. No renal bruits are heard. Some tenderness is reproducible palpation of the epigastric area.   NEURO: Pt is alert and appropriate. No neurologic lateralization is noted. Cranial nerves 2-12 are intact. Peripheral sensory and motor function are grossly normal.                          Decision-Making        1. Gastroesophageal reflux disease with esophagitis  Continue current omeprazole  Eat earlier, don't lay down after eating  Start Carafate as a slurry    - sucralfate (CARAFATE) 1 GM tablet; Take 1 tablet (1 g) by mouth 4 times daily  Dispense: 80 tablet; Refill: 1    2. Need for pneumococcal vaccine    - PNEUMOCOCCAL CONJ VACCINE 13 VALENT IM (PREVNAR 13)  - ADMIN 1st VACCINE                             FOLLOW UP   I have asked the patient to make an appointment for followup with me in two weeks if not markedly improved        I have carefully explained the diagnosis and treatment options with the patient. The patient has displayed an understanding of the above, and all subsequent questions were answered.               DO TIMMY Major    Portions of this note were produced using Teamly  Although every attempt at real-time proof reading has been made, occasional grammar/syntax errors may have been missed.

## 2017-03-20 NOTE — MR AVS SNAPSHOT
After Visit Summary   3/20/2017    Jarrod Lewis    MRN: [id number]           Patient Information     Date Of Birth          1950        Visit Information        Provider Department      3/20/2017 9:00 AM Kong Lassiter MD [facility]        Today's Diagnoses     S/P inguinal hernia repair    -  1       Follow-ups after your visit        Your next 10 appointments already scheduled     Mar 20, 2017  3:20 PM CDT   SHORT with Ardia Cowart DO   [facility] ([facility]    [address] [facility] [id number] [phone]            Jul 17, 2017  2:00 PM CDT   Return Visit with Tatyana Sepulveda MD   [facility] ([facility]    [address] [place] [address] [phone]              Who to contact     If you have questions or need follow up information about today's clinic visit or your schedule please contact [facility] directly at [phone].  Normal or non-critical lab and imaging results will be communicated to you by StackSearchhart, letter or phone within 4 business days after the clinic has received the results. If you do not hear from us within 7 days, please contact the clinic through StackSearchhart or phone. If you have a critical or abnormal lab result, we will notify you by phone as soon as possible.  Submit refill requests through Dopios or call your pharmacy and they will forward the refill request to us. Please allow 3 business days for your refill to be completed.          Additional Information About Your Visit        StackSearchhart Information     Dopios gives you secure access to your electronic health record. If you see a primary care provider, you can also send messages to your care team and make appointments. If you have questions, please call your primary care clinic.  If you do not have a primary care provider, please call [phone] and they will assist  you.        Care EveryWhere ID     This is your Care EveryWhere ID. This could be used by other organizations to access your [place] medical records  [id number]        Your Vitals Were     Pulse Temperature                70 97.4  F (36.3  C) (Skin)           Blood Pressure from Last 3 Encounters:   03/20/17 140/76   03/08/17 132/78   03/06/17 138/82    Weight from Last 3 Encounters:   03/08/17 287 lb (130.2 kg)   03/06/17 287 lb (130.2 kg)   03/06/17 288 lb (130.6 kg)              Today, you had the following     No orders found for display       Primary Care Provider Office Phone # Fax #    Adria GarciabartoloinDO [phone]       [address] [place] [address]        Thank you!     Thank you for choosing [facility]  for your care. Our goal is always to provide you with excellent care. Hearing back from our patients is one way we can continue to improve our services. Please take a few minutes to complete the written survey that you may receive in the mail after your visit with us. Thank you!             Your Updated Medication List - Protect others around you: Learn how to safely use, store and throw away your medicines at www.disposemymeds.org.          This list is accurate as of: 3/20/17 10:09 AM.  Always use your most recent med list.                   Brand Name Dispense Instructions for use    * acetaminophen 325 MG tablet    TYLENOL    100 tablet    Take 650 mg by mouth every 4 hours as needed for mild pain or fever Reported on 3/6/2017       * acetaminophen 325 MG tablet    TYLENOL    100 tablet    Take 2 tablets (650 mg) by mouth every 4 hours as needed for other (mild pain)       garlic 150 MG Tabs tablet      Take 150 mg by mouth daily Reported on 3/20/2017       Lutein 6 MG Caps      Take 1 tablet by mouth Reported on 3/20/2017       omeprazole 40 MG capsule    priLOSEC    30 capsule    Take 1 capsule (40 mg) by mouth daily Take 30-60  minutes before a meal.       oxyCODONE 5 MG IR tablet    ROXICODONE    10 tablet    Take 1-2 tablets (5-10 mg) by mouth every 3 hours as needed for pain or other (Moderate to Severe)       SAW PALMETTO EXTRACT PO      Take 160 mg by mouth daily Reported on 3/20/2017       sildenafil 20 MG tablet    REVATIO/VIAGRA    30 tablet    Take 1 tablet (20 mg) by mouth 3 times daily Take one pill daily as needed       triamcinolone 0.1 % ointment    KENALOG    15 g    Apply sparingly to affected area three times daily as needed.       zinc sulfate 220 MG capsule    ZINCATE     Take 1 tablet by mouth Reported on 3/6/2017       * Notice:  This list has 2 medication(s) that are the same as other medications prescribed for you. Read the directions carefully, and ask your doctor or other care provider to review them with you.

## 2017-03-20 NOTE — PROGRESS NOTES
[place] [facility] NOTE  GENERAL SURGERY    PCP: Adria Cowart         Subjective:     Jarrod Lewis is a 66 year old male who presents with Surgical Followup (open right inguinal hernia repair with mesh done on 03/08/17)    Pain has been well controlled. Currently is not using pain medications. Eating a regular diet and having regular bladder/bowel function. Overall doing well. Denies any swelling or bruising.         Objective:     /76 (BP Location: Right arm, Patient Position: Chair, Cuff Size: Adult Regular)  Pulse 70  Temp 97.4  F (36.3  C) (Skin)   Constitutional: Awake, alert, in no acute distress.  Respiratory: Non-labored.   Cardiovascular: Regular rate and rhythm.   Abdomen: Incision is clean and dry without erythema. Healing well         Assessment and Plan:   No diagnosis found.      Jarrod Lewis is a 66 year old male who presented post operatively and is doing very well.   Patient is traveling by car  Tomorrow to [place]. I told him to take an aspirin for a week to reduce risk of clots. He is to stop driving every 2-3 hours to reduce risks of blood clots. He expressed understanding.  He may increase his activity in 2 weeks.   Follow up with general surgery as needed.      Benny Lassiter MD  [facility]

## 2017-04-05 ENCOUNTER — TELEPHONE (OUTPATIENT)
Dept: DERMATOLOGY | Facility: CLINIC | Age: 67
End: 2017-04-05

## 2017-04-05 NOTE — TELEPHONE ENCOUNTER
Left message for patient to call [facility] in [place] back at [phone]. Patient cancelled appointment for MOHS in February for SCCIS on left helix. Wanting to help reschedule patient or talk about other options if he is not wanting surgery.      Copath Report 01/02/2017 11:34 AM 88   Patient Name: WANG GONZALES   MR#: [id number]   Specimen #: [id number]   Collected: 1/2/2017   Received: 1/3/2017   Reported: 1/4/2017 15:15   Ordering Phy(s): ANCELMO URIBE     For improved result formatting, select 'View Enhanced Report Format'   under Linked Documents section.     SPECIMEN(S):   Skin, left helix     FINAL DIAGNOSIS:   Skin, helix, left:   - Squamous cell carcinoma in situ - (see description)              Samia Waite LPN

## 2017-04-07 NOTE — TELEPHONE ENCOUNTER
"Pt called at cell number no answer. VM id's pt. Left message with details below and for pt to call the [facility] back at [phone]. Pt contacted at home number. RN notified of message below. Pt states that he is not interested in rescheduling the MOHS procedure at this time to his left helix. Pt states \" I just have other health issues I am dealing with right now\". Pt states that he is aware of the dx of SCCIS and the recommended treatment time frame. Pt states that he is aware and has dealt with this before. RN asked pt if he was interested in possible other tx options if surgery is not an  option that will work for pt. Pt states that yes he is interested in other tx options. Will route this message to Dr. Sepulveda so she is aware of pt's decision and to please advise on other treatment options if possible. Thank you....Jyothi Tsang RN        "

## 2017-04-14 NOTE — TELEPHONE ENCOUNTER
Routing message to Dr. Sepulveda. Patient wishes not to proceed with MOHS at this time and open to other options for treatment of SCCIS.     Copath Report 01/02/2017 11:34 AM 88   Patient Name: WANG GONZALES   MR#: [id number]   Specimen #: [id number]   Collected: 1/2/2017   Received: 1/3/2017   Reported: 1/4/2017 15:15   Ordering Phy(s): ANCELMO SEPULVEDA     For improved result formatting, select 'View Enhanced Report Format'   under Linked Documents section.     SPECIMEN(S):   Skin, left helix     FINAL DIAGNOSIS:   Skin, helix, left:   - Squamous cell carcinoma in situ - (see description)          Samia Waite LPN

## 2017-04-18 ENCOUNTER — TELEPHONE (OUTPATIENT)
Dept: FAMILY MEDICINE | Facility: OTHER | Age: 67
End: 2017-04-18

## 2017-04-18 ENCOUNTER — OFFICE VISIT (OUTPATIENT)
Dept: FAMILY MEDICINE | Facility: OTHER | Age: 67
End: 2017-04-18
Payer: COMMERCIAL

## 2017-04-18 VITALS
WEIGHT: 281 LBS | HEART RATE: 83 BPM | DIASTOLIC BLOOD PRESSURE: 84 MMHG | RESPIRATION RATE: 16 BRPM | BODY MASS INDEX: 37.08 KG/M2 | TEMPERATURE: 97.1 F | OXYGEN SATURATION: 96 % | SYSTOLIC BLOOD PRESSURE: 138 MMHG

## 2017-04-18 DIAGNOSIS — K21.00 GASTROESOPHAGEAL REFLUX DISEASE WITH ESOPHAGITIS: Primary | ICD-10-CM

## 2017-04-18 DIAGNOSIS — K21.00 GASTROESOPHAGEAL REFLUX DISEASE WITH ESOPHAGITIS: ICD-10-CM

## 2017-04-18 PROCEDURE — 99213 OFFICE O/P EST LOW 20 MIN: CPT | Performed by: INTERNAL MEDICINE

## 2017-04-18 RX ORDER — OMEPRAZOLE 40 MG/1
40 CAPSULE, DELAYED RELEASE ORAL DAILY
Qty: 30 CAPSULE | Refills: 1 | Status: SHIPPED | OUTPATIENT
Start: 2017-04-18 | End: 2017-07-28

## 2017-04-18 RX ORDER — SUCRALFATE 1 G/1
1 TABLET ORAL 4 TIMES DAILY
Qty: 80 TABLET | Refills: 1 | Status: SHIPPED | OUTPATIENT
Start: 2017-04-18 | End: 2017-11-07

## 2017-04-18 ASSESSMENT — PAIN SCALES - GENERAL: PAINLEVEL: NO PAIN (0)

## 2017-04-18 NOTE — NURSING NOTE
"Chief Complaint   Patient presents with     Gastrophageal Reflux     recheck and he doing better       Initial /84  Pulse 83  Temp 97.1  F (36.2  C) (Tympanic)  Resp 16  Wt 281 lb (127.5 kg)  SpO2 96%  BMI 37.08 kg/m2 Estimated body mass index is 37.08 kg/(m^2) as calculated from the following:    Height as of 3/8/17: 6' 0.99\" (1.854 m).    Weight as of this encounter: 281 lb (127.5 kg).  Medication Reconciliation: complete    "

## 2017-04-18 NOTE — MR AVS SNAPSHOT
After Visit Summary   4/18/2017    Jarrod Lewis    MRN: [id number]           Patient Information     Date Of Birth          1950        Visit Information        Provider Department      4/18/2017 7:00 AM Adria Cowart DO [facility]        Today's Diagnoses     Gastroesophageal reflux disease with esophagitis    -  1       Follow-ups after your visit        Additional Services     GASTROENTEROLOGY ADULT REF PROCEDURE ONLY       Last Lab Result: Creatinine (mg/dL)       Date                     Value                 03/06/2017               0.99             ----------  Body mass index is 37.08 kg/(m^2).     Needed:  No  Language:  English    Patient will be contacted to schedule procedure.     Please be aware that coverage of these services is subject to the terms and limitations of your health insurance plan.  Call member services at your health plan with any benefit or coverage questions.  Any procedures must be performed at a [facility] facility OR coordinated by your clinic's referral office.    Please bring the following with you to your appointment:    (1) Any X-Rays, CTs or MRIs which have been performed.  Contact the facility where they were done to arrange for  prior to your scheduled appointment.    (2) List of current medications   (3) This referral request   (4) Any documents/labs given to you for this referral                  Your next 10 appointments already scheduled     Jul 17, 2017  2:00 PM CDT   Return Visit with Tatyana Sepulveda MD   [facility]    [id number] [place] [address] [phone]              Who to contact     If you have questions or need follow up information about today's clinic visit or your schedule please contact [facility] directly at [phone].  Normal or non-critical lab and imaging results will be communicated to you by Rudy  letter or phone within 4 business days after the clinic has received the results. If you do not hear from us within 7 days, please contact the clinic through [employer] K[employer]is or phone. If you have a critical or abnormal lab result, we will notify you by phone as soon as possible.  Submit refill requests through [employer] or call your pharmacy and they will forward the refill request to us. Please allow 3 business days for your refill to be completed.          Additional Information About Your Visit        [employer] Information     [employer] gives you secure access to your electronic health record. If you see a primary care provider, you can also send messages to your care team and make appointments. If you have questions, please call your primary care clinic.  If you do not have a primary care provider, please call [phone] and they will assist you.        Care EveryWhere ID     This is your Care EveryWhere ID. This could be used by other organizations to access your [facility] medical records  [id number]        Your Vitals Were     Pulse Temperature Respirations Pulse Oximetry BMI (Body Mass Index)       83 97.1  F (36.2  C) (Tympanic) 16 96% 37.08 kg/m2        Blood Pressure from Last 3 Encounters:   04/18/17 138/84   03/20/17 152/86   03/20/17 140/76    Weight from Last 3 Encounters:   04/18/17 281 lb (127.5 kg)   03/20/17 282 lb (127.9 kg)   03/08/17 287 lb (130.2 kg)              We Performed the Following     GASTROENTEROLOGY ADULT REF PROCEDURE ONLY        Primary Care Provider Office Phone # Fax #    Adria Uziel Cowart [phone]       [address]   [facility] [address]        Thank you!     Thank you for choosing [facility]  for your care. Our goal is always to provide you with excellent care. Hearing back from our patients is one way we can continue to improve our services. Please take a few minutes to complete the written survey that you may receive in  the mail after your visit with us. Thank you!             Your Updated Medication List - Protect others around you: Learn how to safely use, store and throw away your medicines at www.disposemymeds.org.          This list is accurate as of: 4/18/17  7:26 AM.  Always use your most recent med list.                   Brand Name Dispense Instructions for use    acetaminophen 325 MG tablet    TYLENOL    100 tablet    Take 650 mg by mouth every 4 hours as needed for mild pain or fever Reported on 3/6/2017       omeprazole 40 MG capsule    priLOSEC    30 capsule    Take 1 capsule (40 mg) by mouth daily Take 30-60 minutes before a meal.       sucralfate 1 GM tablet    CARAFATE    80 tablet    Take 1 tablet (1 g) by mouth 4 times daily       triamcinolone 0.1 % ointment    KENALOG    15 g    Apply sparingly to affected area three times daily as needed.

## 2017-04-18 NOTE — TELEPHONE ENCOUNTER
Jarrod is scheduled for his upper endoscopy on Friday May 19 at 0900 hours with arrival time of 0800 hours.  Prep instructions will be mailed to patient.    Jarrod would like a refill on his medication (Carafate and Prilosec), if you could send to pharmacy patient will .    Aidee RAVI

## 2017-04-18 NOTE — PROGRESS NOTES
SUBJECTIVE:                                                    Jarrod Lewis is a 66 year old male who presents to clinic today for the following health issues:      Chief Complaint   Patient presents with     Gastrophageal Reflux     recheck and he doing better       CHIEF COMPLAINT:    The patient is a pleasant 66-year-old gentleman who has been having some problems with gastroesophageal reflux. He was placed on omeprazole which brought about some improvement but not completely. He subsequently was placed on Carafate and did have good results but as soon as he discontinues the medication, his reflux symptoms worsen. He has elevated head of bed, he is eating earlier in the evening, and he is eating appropriate foods. He's had no melena or hematochezia but does have persistent epigastric and sometimes retrosternal discomfort. The discomfort can be improved promptly with the drinking of cold water but this only last for a few moments.                       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     Surgical History:   has a past surgical history that includes Colonoscopy (12/14/2012); Colonoscopy (N/A, 10/26/2016); Mohs micrographic procedure (Left, 01/02/2017); joint replacement, hip rt/lt (Right, 02/03/2012); TOTAL KNEE ARTHROPLASTY (Bilateral); and Herniorrhaphy inguinal (Right, 3/8/2017).     Social History:   reports that he quit smoking about 5 years ago. His smoking use included Pipe. He has never used smokeless tobacco. He reports that he drinks alcohol. He reports that he does not use illicit drugs.     Family History:  family history includes Stomach Cancer in his father.            MEDICATIONS  Current Outpatient Prescriptions   Medication Sig Dispense Refill     sucralfate (CARAFATE) 1 GM tablet Take 1 tablet (1 g) by mouth 4 times daily 80 tablet 1     omeprazole (PRILOSEC) 40  MG capsule Take 1 capsule (40 mg) by mouth daily Take 30-60 minutes before a meal. 30 capsule 1     acetaminophen (TYLENOL) 325 MG tablet Take 650 mg by mouth every 4 hours as needed for mild pain or fever Reported on 3/6/2017 100 tablet      triamcinolone (KENALOG) 0.1 % ointment Apply sparingly to affected area three times daily as needed. 15 g 1         --------------------------------------------------------------------------------------------------------------------                          REVIEW OF SYSTEMS:         LUNGS: Pt denies: cough,excess sputum, hemoptysis, or shortness of breath.   HEART: Pt denies: chest pain, arrythmia, syncope, tachy or bradyarrhythmia or excess edema.   GI: Pt denies: nausea, vomitting, diarrhea, constipation, melena, or hematochezia.   NEURO: Pt denies: seizures, strokes, diplopia, weakness, paraesthesias, or paralysis.   SKIN: Pt denies: itching, rashes, discoloration, or specific lesions of concern. Denies recent hair loss.                          EXAMINATION:         /84  Pulse 83  Temp 97.1  F (36.2  C) (Tympanic)  Resp 16  Wt 281 lb (127.5 kg)  SpO2 96%  BMI 37.08 kg/m2   Constitutional: The patient appears to be in no acute distress. The patient appears to be adequately hydrated. No acute respiratory or hemodynamic distress is noted at this time.   LUNGS: clear bilaterally, airflow is brisk, no intercostal retraction or stridor is noted. No coughing is noted during visit.   HEART:  regular without rubs, clicks, gallops, or murmurs. PMI is nondisplaced. Upstrokes are brisk. S1,S2 are heard.   GI: Abdomen is soft, without rebound, guarding or tenderness. Bowel sounds are appropriate. No renal bruits are heard.    NEURO: Pt is alert and appropriate. No neurologic lateralization is noted. Cranial nerves 2-12 are intact. Peripheral sensory and motor function are grossly normal                        DECISION MAKIN. Gastroesophageal reflux disease with  esophagitis  We'll set up EGD,   Continue PPI and Carafate  - GASTROENTEROLOGY ADULT REF PROCEDURE ONLY                               FOLLOW UP    I have asked the patient to make an appointment for follow up with me after the EGD        I have carefully explained the diagnosis and treatment options with the patient. The patient has displayed an understanding of the above, and all subsequent questions were answered.         DO TIMMY Major    Portions of this note were produced using [employer]  Although every attempt at real-time proof reading has been made, occasional grammar/syntax errors may have been missed.

## 2017-04-24 ENCOUNTER — SURGERY (OUTPATIENT)
Age: 67
End: 2017-04-24

## 2017-04-24 ENCOUNTER — HOSPITAL ENCOUNTER (OUTPATIENT)
Facility: CLINIC | Age: 67
Discharge: HOME | End: 2017-04-24
Attending: INTERNAL MEDICINE | Admitting: INTERNAL MEDICINE
Payer: MEDICARE

## 2017-04-24 VITALS
RESPIRATION RATE: 16 BRPM | DIASTOLIC BLOOD PRESSURE: 78 MMHG | HEIGHT: 74 IN | OXYGEN SATURATION: 96 % | BODY MASS INDEX: 36.06 KG/M2 | TEMPERATURE: 97.5 F | WEIGHT: 281 LBS | SYSTOLIC BLOOD PRESSURE: 141 MMHG

## 2017-04-24 LAB — UPPER GI ENDOSCOPY: NORMAL

## 2017-04-24 PROCEDURE — 40000296 ZZH STATISTIC ENDO RECOVERY CLASS 1:2 FIRST HOUR: Performed by: INTERNAL MEDICINE

## 2017-04-24 PROCEDURE — 25000125 ZZHC RX 250: Performed by: INTERNAL MEDICINE

## 2017-04-24 PROCEDURE — 88305 TISSUE EXAM BY PATHOLOGIST: CPT | Performed by: INTERNAL MEDICINE

## 2017-04-24 PROCEDURE — 25000128 H RX IP 250 OP 636: Performed by: INTERNAL MEDICINE

## 2017-04-24 PROCEDURE — 88305 TISSUE EXAM BY PATHOLOGIST: CPT | Mod: 26 | Performed by: INTERNAL MEDICINE

## 2017-04-24 PROCEDURE — 43239 EGD BIOPSY SINGLE/MULTIPLE: CPT | Performed by: INTERNAL MEDICINE

## 2017-04-24 RX ORDER — ONDANSETRON 2 MG/ML
4 INJECTION INTRAMUSCULAR; INTRAVENOUS
Status: DISCONTINUED | OUTPATIENT
Start: 2017-04-24 | End: 2017-04-24 | Stop reason: HOSPADM

## 2017-04-24 RX ORDER — FENTANYL CITRATE 50 UG/ML
INJECTION, SOLUTION INTRAMUSCULAR; INTRAVENOUS PRN
Status: DISCONTINUED | OUTPATIENT
Start: 2017-04-24 | End: 2017-04-24 | Stop reason: HOSPADM

## 2017-04-24 RX ORDER — LIDOCAINE 40 MG/G
CREAM TOPICAL
Status: DISCONTINUED | OUTPATIENT
Start: 2017-04-24 | End: 2017-04-24 | Stop reason: HOSPADM

## 2017-04-24 RX ADMIN — MIDAZOLAM HYDROCHLORIDE 1 MG: 1 INJECTION, SOLUTION INTRAMUSCULAR; INTRAVENOUS at 14:26

## 2017-04-24 RX ADMIN — MIDAZOLAM HYDROCHLORIDE 1 MG: 1 INJECTION, SOLUTION INTRAMUSCULAR; INTRAVENOUS at 14:25

## 2017-04-24 RX ADMIN — LIDOCAINE HYDROCHLORIDE 5 ML: 20 SOLUTION ORAL; TOPICAL at 14:23

## 2017-04-24 RX ADMIN — FENTANYL CITRATE 50 MCG: 50 INJECTION, SOLUTION INTRAMUSCULAR; INTRAVENOUS at 14:24

## 2017-04-24 RX ADMIN — MIDAZOLAM HYDROCHLORIDE 2 MG: 1 INJECTION, SOLUTION INTRAMUSCULAR; INTRAVENOUS at 14:24

## 2017-04-24 RX ADMIN — MIDAZOLAM HYDROCHLORIDE 1 MG: 1 INJECTION, SOLUTION INTRAMUSCULAR; INTRAVENOUS at 14:27

## 2017-04-24 NOTE — CONSULTS
"[facility] GI Pre-Procedure Physical Assessment    Jarrod Lewis MRN# [id number]   Age: 66 year old YOB: 1950      Date of Surgery: 4/24/2017  Location [facility]      Date of Exam 4/24/2017 Facility (Same day)       Home clinic: [facility]  Primary care provider: Adria Cowart         Active problem list:   Patient Active Problem List   Diagnosis     CARDIOVASCULAR SCREENING; LDL GOAL LESS THAN 130     Reducible right inguinal hernia     GI bleed     Advanced directives, counseling/discussion            Medications (include herbals and vitamins):   Any Plavix use in the last 7 days?  No     Current Facility-Administered Medications   Medication     lidocaine 1 % 1 mL     lidocaine (LMX4) kit     sodium chloride (PF) 0.9% PF flush 3 mL     sodium chloride (PF) 0.9% PF flush 3 mL     sodium chloride (PF) 0.9% PF flush 3 mL     ondansetron (ZOFRAN) injection 4 mg             Allergies:      Allergies   Allergen Reactions     Penicillins Unknown     As a child       Zithromax [Azithromycin Dihydrate]      diarrhea     Allergy to Latex?  No  Allergy to tape?    No          Social History:     Social History   Substance Use Topics     Smoking status: Former Smoker     Types: Pipe     Quit date: 2012     Smokeless tobacco: Never Used     Alcohol use 0.0 oz/week     0 Standard drinks or equivalent per week      Comment: occasional            Physical Exam:   All vitals have been reviewed  Blood pressure (!) 152/93, temperature 97.5  F (36.4  C), temperature source Oral, resp. rate 14, height 6' 2\" (1.88 m), weight 281 lb (127.5 kg), SpO2 98 %.  Airway assessment:   Patient is able to open mouth wide  Patient is able to stick out tongue      Lungs:   No increased work of breathing, good air exchange, clear to auscultation bilaterally, no crackles or wheezing      Cardiovascular:   Normal apical impulse, regular rate and rhythm, normal S1 and S2, no S3 or S4, " and no murmur noted           Lab / Radiology Results:   All laboratory data reviewed          Assessment:   Appropriately NPO  Chief complaint or anatomic assessment of involved area: GERD for EGD         Plan:   Moderate (conscious) sedation     Patient's active problems diagnostically and therapeutically optimized for the planned procedure  Risks, benefits, alternatives to sedation and blood explained and consent obtained  Risks, benefits, alternatives to procedure explained and consent obtained  Orders and progress notes are in the chart  Discharge from Phase 1 and / or Phase 2 recovery when patient meets criteria    I have reviewed the history and physical, lab finding(s), diagnostic data, medicaitons, and the plan for sedation.  I have determined this patient to be an appropriate candidate for the planned sedation / procedure and have reassessed the patient immediately prior to sedation / procedure.    I have personally and medically directed the administration of medications used.    Puma Dominguez MD

## 2017-04-26 LAB — COPATH REPORT: NORMAL

## 2017-04-27 NOTE — TELEPHONE ENCOUNTER
Writer called patient and explained Dr. Forte recommendations for treatment of SCCIS on left helix if he does not want to proceed with MOHS. Patient agreed to an ED&C and was scheduled at 1:45pm on 5-.     Samia Waite LPN

## 2017-04-28 ENCOUNTER — TRANSFERRED RECORDS (OUTPATIENT)
Dept: HEALTH INFORMATION MANAGEMENT | Facility: CLINIC | Age: 67
End: 2017-04-28

## 2017-05-22 ENCOUNTER — OFFICE VISIT (OUTPATIENT)
Dept: DERMATOLOGY | Facility: CLINIC | Age: 67
End: 2017-05-22
Payer: COMMERCIAL

## 2017-05-22 VITALS — OXYGEN SATURATION: 98 % | DIASTOLIC BLOOD PRESSURE: 89 MMHG | HEART RATE: 72 BPM | SYSTOLIC BLOOD PRESSURE: 142 MMHG

## 2017-05-22 DIAGNOSIS — D04.22 SQUAMOUS CELL CARCINOMA IN SITU OF SKIN OF LEFT EAR: Primary | ICD-10-CM

## 2017-05-22 PROCEDURE — 17281 DSTR MAL LS F/E/E/N/L/M .6-1: CPT | Mod: GC | Performed by: DERMATOLOGY

## 2017-05-22 NOTE — NURSING NOTE
"Jarrod Lewis's goals for this visit include: ED&C left helix SCCIS  He requests these members of his care team be copied on today's visit information:     PCP: Adria Cowart    Referring Provider:  No referring provider defined for this encounter.    Chief Complaint   Patient presents with     Procedure     ED&C left helix SCCIS       Initial /89  Pulse 72  SpO2 98% Estimated body mass index is 36.08 kg/(m^2) as calculated from the following:    Height as of 4/24/17: 1.88 m (6' 2\").    Weight as of 4/24/17: 127.5 kg (281 lb).  Medication Reconciliation: complete    Do you need any medication refills at today's visit? No    Samia Waite LPN      "

## 2017-05-22 NOTE — LETTER
"5/22/2017      RE: Jarrod Lewis  [address]  [place] [address]       Derm Problem List:  1. 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  The expected cure rates, healing times and anticipated scars of each option were discussed and the patient elects to proceed with ED and C.     The risks and benefits of the procedure were described to the patient.  These include but are not limited to bleeding, infection, scar, incomplete removal, and recurrence. Written informed consent was obtained. Time-out was performed. The above site was cleansed with and injected with 2.0 mL1% lidocaine with epinephrine. Once anesthesia was obtained, the site was prepped with Chlorhexidine and rinsed with sterile saline. The lesion was curetted with  in 3 directions with a 3 mm margin and this was followed by electrodessication.  This process was repeated three times. The defect measured 1.4 x 1.3 cm. Vaseline and a bandage were applied to the wound. The patient tolerated the procedure well and was given post care instructions.    Clinical Follow-up: 6 monnths    Dr. Tatyana Sepulveda MD staffed the patient.     Staff Involved:  Resident(Rose)/Scribe/Staff  I, Manoj Soliman, am serving as a scribe to document services personally performed by Dr. Tatyana Sepulveda MD, based on data collection and the provider's statements to me.    Staff Physician Comments:   I saw and evaluated the patient with the resident and I agree with the assessment and plan.  I was present for the entire minor procedure and " examination.    Provider Disclosure:   I agree with above History, Review of Systems, Physical exam and Plan. I have reviewed the content of the documentation and have edited it as needed. I have personally performed the services documented here and the documentation accurately represents those services and the decisions I have made.     Tatyana Seuplveda MD    Department of Dermatology  [facility]          Tatyana Sepulveda MD

## 2017-05-22 NOTE — PROGRESS NOTES
"Derm Problem List:  1. 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  The expected cure rates, healing times and anticipated scars of each option were discussed and the patient elects to proceed with ED and C.     The risks and benefits of the procedure were described to the patient.  These include but are not limited to bleeding, infection, scar, incomplete removal, and recurrence. Written informed consent was obtained. Time-out was performed. The above site was cleansed with and injected with 2.0 mL1% lidocaine with epinephrine. Once anesthesia was obtained, the site was prepped with Chlorhexidine and rinsed with sterile saline. The lesion was curetted with  in 3 directions with a 3 mm margin and this was followed by electrodessication.  This process was repeated three times. The defect measured 1.4 x 1.3 cm. Vaseline and a bandage were applied to the wound. The patient tolerated the procedure well and was given post care instructions.    Clinical Follow-up: 6 monnths    Dr. Tatyana Sepulveda MD staffed the patient.     Staff Involved:  Resident(Rose)/Scribe/Staff  IManoj, am serving as a scribe to document services personally performed by Dr. Tatyana Sepulveda MD, based on data collection and the provider's statements to me.    Staff Physician Comments:   I saw and evaluated the patient with the resident and I agree with the assessment and plan.  I was present for the entire minor procedure and examination.    Provider Disclosure:   I agree with above History, Review of Systems, " Physical exam and Plan. I have reviewed the content of the documentation and have edited it as needed. I have personally performed the services documented here and the documentation accurately represents those services and the decisions I have made.     Tatyana Sepulveda MD    Department of Dermatology  [facility]

## 2017-05-22 NOTE — PATIENT INSTRUCTIONS
Wound Care:  Electrodesiccation and Curettage     I will experience scar, altered skin color, bleeding, swelling, pain, crusting and redness. I may experience altered sensation. Risks are excessive bleeding, infection, muscle weakness, thick (hypertrophic or keloidal) scar, and recurrence,. A second procedure may be recommended to obtain the best cosmetic or functional result.    What is electrodesiccation and curettage ?    Scraping off tissue (curettage)    Destroy tissue using electric current or cautery (electrodessication)    How do I perform wound care?    Keep dressing in place for two days. You may shower with the dressing in place(do not get wet)    After 2 days, wash hands and remove dressing. Clean wound with cotton-swab soaked in hydrogen peroxide to remove drainage and crust    Put on a thick layer of Vaseline on the wound using a cotton-swab     Cover the wound with a Band-AidTM to protect from dust and tight clothing    If wound is draining before two days, change your dressing as described above sooner    During wound care, do not allow the area to dry out or form a scab    What do I need?    Hydrogen peroxide     Cotton-swabs     Vaseline or petroleum jelly     Band-AidsTM or dressing supplies as needed     When should I call the doctor?  [facility]: [phone]  [facility]: [phone]  For urgent needs outside of business hours call the [facility] at [phone] and ask for the dermatology resident on call

## 2017-05-22 NOTE — MR AVS SNAPSHOT
After Visit Summary   5/22/2017    Jarrod Lewis    MRN: [id number]           Patient Information     Date Of Birth          1950        Visit Information        Provider Department      5/22/2017 1:45 PM Tatyana Sepulveda MD [facility]        Care Instructions    Wound Care:  Electrodesiccation and Curettage     I will experience scar, altered skin color, bleeding, swelling, pain, crusting and redness. I may experience altered sensation. Risks are excessive bleeding, infection, muscle weakness, thick (hypertrophic or keloidal) scar, and recurrence,. A second procedure may be recommended to obtain the best cosmetic or functional result.    What is electrodesiccation and curettage ?    Scraping off tissue (curettage)    Destroy tissue using electric current or cautery (electrodessication)    How do I perform wound care?    Keep dressing in place for two days. You may shower with the dressing in place(do not get wet)    After 2 days, wash hands and remove dressing. Clean wound with cotton-swab soaked in hydrogen peroxide to remove drainage and crust    Put on a thick layer of Vaseline on the wound using a cotton-swab     Cover the wound with a Band-AidTM to protect from dust and tight clothing    If wound is draining before two days, change your dressing as described above sooner    During wound care, do not allow the area to dry out or form a scab    What do I need?    Hydrogen peroxide     Cotton-swabs     Vaseline or petroleum jelly     Band-AidsTM or dressing supplies as needed     When should I call the doctor?  [facility]: [phone]  [facility]: [phone]  For urgent needs outside of business hours call the [facility] at [phone] and ask for the dermatology resident on call          Follow-ups after your visit        Your next 10 appointments already scheduled     Jun 12, 2017   Procedure  with Sylvester Lucas MD   [facility] ([facility])    [address] [address] [phone]              Who to contact     If you have questions or need follow up information about today's clinic visit or your schedule please contact [facility] directly at [phone].  Normal or non-critical lab and imaging results will be communicated to you by [employer], letter or phone within 4 business days after the clinic has received the results. If you do not hear from us within 7 days, please contact the clinic through [employer] or phone. If you have a critical or abnormal lab result, we will notify you by phone as soon as possible.  Submit refill requests through [employer] or call your pharmacy and they will forward the refill request to us. Please allow 3 business days for your refill to be completed.          Additional Information About Your Visit        [employer] Information     [employer] gives you secure access to your electronic health record. If you see a primary care provider, you can also send messages to your care team and make appointments. If you have questions, please call your primary care clinic.  If you do not have a primary care provider, please call [phone] and they will assist you.      [employer] is an electronic gateway that provides easy, online access to your medical records. With [employer], you can request a clinic appointment, read your test results, renew a prescription or communicate with your care team.     To access your existing account, please contact your [facility] or call [phone] for assistance.        Care EveryWhere ID     This is your Care EveryWhere ID. This could be used by other organizations to access your [place] medical records  [id number]        Your Vitals Were     Pulse Pulse Oximetry                72 98%           Blood Pressure from Last 3 Encounters:   05/22/17 142/89    04/24/17 141/78   04/18/17 138/84    Weight from Last 3 Encounters:   04/24/17 127.5 kg (281 lb)   04/18/17 127.5 kg (281 lb)   03/20/17 127.9 kg (282 lb)              Today, you had the following     No orders found for display       Primary Care Provider Office Phone # Fax #    Adria Cowart [phone]       [address] [place] [address]        Thank you!     Thank you for choosing [facility]  for your care. Our goal is always to provide you with excellent care. Hearing back from our patients is one way we can continue to improve our services. Please take a few minutes to complete the written survey that you may receive in the mail after your visit with us. Thank you!             Your Updated Medication List - Protect others around you: Learn how to safely use, store and throw away your medicines at www.disposemymeds.org.          This list is accurate as of: 5/22/17  2:04 PM.  Always use your most recent med list.                   Brand Name Dispense Instructions for use    acetaminophen 325 MG tablet    TYLENOL    100 tablet    Take 650 mg by mouth every 4 hours as needed for mild pain or fever Reported on 3/6/2017       omeprazole 40 MG capsule    priLOSEC    30 capsule    Take 1 capsule (40 mg) by mouth daily Take 30-60 minutes before a meal.       sucralfate 1 GM tablet    CARAFATE    80 tablet    Take 1 tablet (1 g) by mouth 4 times daily       triamcinolone 0.1 % ointment    KENALOG    15 g    Apply sparingly to affected area three times daily as needed.

## 2017-06-05 RX ORDER — LIDOCAINE HYDROCHLORIDE AND EPINEPHRINE 10; 10 MG/ML; UG/ML
3 INJECTION, SOLUTION INFILTRATION; PERINEURAL ONCE
Qty: 3 ML | Refills: 0 | OUTPATIENT
Start: 2017-06-05 | End: 2017-06-05

## 2017-06-12 ENCOUNTER — SURGERY (OUTPATIENT)
Age: 67
End: 2017-06-12

## 2017-06-12 ENCOUNTER — HOSPITAL ENCOUNTER (OUTPATIENT)
Facility: CLINIC | Age: 67
Discharge: HOME | End: 2017-06-12
Attending: INTERNAL MEDICINE | Admitting: INTERNAL MEDICINE
Payer: MEDICARE

## 2017-06-12 VITALS
DIASTOLIC BLOOD PRESSURE: 94 MMHG | SYSTOLIC BLOOD PRESSURE: 144 MMHG | TEMPERATURE: 97.9 F | RESPIRATION RATE: 18 BRPM | OXYGEN SATURATION: 98 %

## 2017-06-12 LAB — UPPER GI ENDOSCOPY: NORMAL

## 2017-06-12 PROCEDURE — G0500 MOD SEDAT ENDO SERVICE >5YRS: HCPCS

## 2017-06-12 PROCEDURE — 25000128 H RX IP 250 OP 636: Performed by: INTERNAL MEDICINE

## 2017-06-12 PROCEDURE — 43235 EGD DIAGNOSTIC BRUSH WASH: CPT | Performed by: INTERNAL MEDICINE

## 2017-06-12 PROCEDURE — 25000125 ZZHC RX 250: Performed by: INTERNAL MEDICINE

## 2017-06-12 PROCEDURE — 40000296 ZZH STATISTIC ENDO RECOVERY CLASS 1:2 FIRST HOUR: Performed by: INTERNAL MEDICINE

## 2017-06-12 RX ORDER — FENTANYL CITRATE 50 UG/ML
INJECTION, SOLUTION INTRAMUSCULAR; INTRAVENOUS PRN
Status: DISCONTINUED | OUTPATIENT
Start: 2017-06-12 | End: 2017-06-12 | Stop reason: HOSPADM

## 2017-06-12 RX ORDER — ONDANSETRON 2 MG/ML
4 INJECTION INTRAMUSCULAR; INTRAVENOUS
Status: DISCONTINUED | OUTPATIENT
Start: 2017-06-12 | End: 2017-06-12 | Stop reason: HOSPADM

## 2017-06-12 RX ORDER — LIDOCAINE 40 MG/G
CREAM TOPICAL
Status: DISCONTINUED | OUTPATIENT
Start: 2017-06-12 | End: 2017-06-12 | Stop reason: HOSPADM

## 2017-06-12 RX ADMIN — MIDAZOLAM HYDROCHLORIDE 2 MG: 1 INJECTION, SOLUTION INTRAMUSCULAR; INTRAVENOUS at 09:26

## 2017-06-12 RX ADMIN — FENTANYL CITRATE 50 MCG: 50 INJECTION, SOLUTION INTRAMUSCULAR; INTRAVENOUS at 09:26

## 2017-06-12 RX ADMIN — MIDAZOLAM HYDROCHLORIDE 1 MG: 1 INJECTION, SOLUTION INTRAMUSCULAR; INTRAVENOUS at 09:29

## 2017-06-12 RX ADMIN — MIDAZOLAM HYDROCHLORIDE 1 MG: 1 INJECTION, SOLUTION INTRAMUSCULAR; INTRAVENOUS at 09:27

## 2017-06-12 RX ADMIN — MIDAZOLAM HYDROCHLORIDE 1 MG: 1 INJECTION, SOLUTION INTRAMUSCULAR; INTRAVENOUS at 09:28

## 2017-06-12 RX ADMIN — LIDOCAINE HYDROCHLORIDE 5 ML: 20 SOLUTION ORAL; TOPICAL at 09:26

## 2017-06-12 NOTE — IP AVS SNAPSHOT
[facility]    [address] [address]    Phone:  [phone]                                       After Visit Summary   6/12/2017    Jarrod Lewis    MRN: [id number]           After Visit Summary Signature Page     I have received my discharge instructions, and my questions have been answered. I have discussed any challenges I see with this plan with the nurse or doctor.    ..........................................................................................................................................  Patient/Patient Representative Signature      ..........................................................................................................................................  Patient Representative Print Name and Relationship to Patient    ..................................................               ................................................  Date                                            Time    ..........................................................................................................................................  Reviewed by Signature/Title    ...................................................              ..............................................  Date                                                            Time

## 2017-06-12 NOTE — CONSULTS
[facility] GI Pre-Procedure Physical Assessment    Jarrod Lewis MRN# [id number]   Age: 66 year old YOB: 1950      Date of Surgery: 6/12/2017  Location [facility]      Date of Exam 6/12/2017 Facility (Same day)       Home clinic: [facility]  Primary care provider: Adria Cowart         Active problem list:   Patient Active Problem List   Diagnosis     CARDIOVASCULAR SCREENING; LDL GOAL LESS THAN 130     Reducible right inguinal hernia     GI bleed     Advanced directives, counseling/discussion            Medications (include herbals and vitamins):   Any Plavix use in the last 7 days?  No     Current Facility-Administered Medications   Medication     lidocaine 1 % 1 mL     lidocaine (LMX4) kit     sodium chloride (PF) 0.9% PF flush 3 mL     sodium chloride (PF) 0.9% PF flush 3 mL     sodium chloride (PF) 0.9% PF flush 3 mL     ondansetron (ZOFRAN) injection 4 mg             Allergies:      Allergies   Allergen Reactions     Penicillins Unknown     As a child       Zithromax [Azithromycin Dihydrate]      diarrhea     Allergy to Latex?  No  Allergy to tape?    No          Social History:     Social History   Substance Use Topics     Smoking status: Former Smoker     Types: Pipe     Quit date: 2012     Smokeless tobacco: Never Used     Alcohol use 0.0 oz/week     0 Standard drinks or equivalent per week      Comment: occasional            Physical Exam:   All vitals have been reviewed  Blood pressure 145/89, temperature 97.9  F (36.6  C), resp. rate 16, SpO2 98 %.  Airway assessment:   Patient is able to open mouth wide  Patient is able to stick out tongue      Lungs:   No increased work of breathing, good air exchange, clear to auscultation bilaterally, no crackles or wheezing      Cardiovascular:   Normal apical impulse, regular rate and rhythm, normal S1 and S2, no S3 or S4, and no murmur noted           Lab / Radiology Results:   All laboratory data  reviewed          Assessment:   Appropriately NPO  Chief complaint or anatomic assessment of involved area: follow-up esophageal ulcer         Plan:   Moderate (conscious) sedation     Patient's active problems diagnostically and therapeutically optimized for the planned procedure  Risks, benefits, alternatives to sedation and blood explained and consent obtained  Risks, benefits, alternatives to procedure explained and consent obtained  Orders and progress notes are in the chart  Discharge from Phase 1 and / or Phase 2 recovery when patient meets criteria    I have reviewed the history and physical, lab finding(s), diagnostic data, medicaitons, and the plan for sedation.  I have determined this patient to be an appropriate candidate for the planned sedation / procedure and have reassessed the patient immediately prior to sedation / procedure.    I have personally and medically directed the administration of medications used.    Sylvester Lucas MD

## 2017-06-12 NOTE — IP AVS SNAPSHOT
MRN:[id number]                      After Visit Summary   6/12/2017    Jarrod Lewis    MRN: [id number]           Thank you!     Thank you for choosing [place] for your care. Our goal is always to provide you with excellent care. Hearing back from our patients is one way we can continue to improve our services. Please take a few minutes to complete the written survey that you may receive in the mail after you visit with us. Thank you!        Patient Information     Date Of Birth          1950        About your hospital stay     You were admitted on:  June 12, 2017 You last received care in the:  [facility]    You were discharged on:  June 12, 2017       Who to Call     For medical emergencies, please call 911.  For non-urgent questions about your medical care, please call your primary care provider or clinic, [phone]  For questions related to your surgery, please call your surgery clinic        Attending Provider     Provider Specialty    Sylvester Lucas MD Gastroenterology       Primary Care Provider Office Phone # Fax #    Adria Uziel GarciabartoloinDO [phone]      Pending Results     No orders found from 6/10/2017 to 6/13/2017.            Admission Information     Date & Time Provider Department Dept. Phone    6/12/2017 Sylvester Lucas MD [facility] End[facility] [phone]      Your Vitals Were     Blood Pressure Temperature Respirations Pulse Oximetry          144/94 97.9  F (36.6  C) 18 98%        MyChart Information     [employer] gives you secure access to your electronic health record. If you see a primary care provider, you can also send messages to your care team and make appointments. If you have questions, please call your primary care clinic.  If you do not have a primary care provider, please call [phone] and they will assist you.        Care EveryWhere ID     This is your Care EveryWhere ID. This could be used by other  organizations to access your [place] medical records  [id number]           Review of your medicines      CONTINUE these medicines which have NOT CHANGED        Dose / Directions    acetaminophen 325 MG tablet   Commonly known as:  TYLENOL        Dose:  650 mg   Take 650 mg by mouth every 4 hours as needed for mild pain or fever Reported on 3/6/2017   Quantity:  100 tablet   Refills:  0       omeprazole 40 MG capsule   Commonly known as:  priLOSEC   Used for:  Gastroesophageal reflux disease with esophagitis        Dose:  40 mg   Take 1 capsule (40 mg) by mouth daily Take 30-60 minutes before a meal.   Quantity:  30 capsule   Refills:  1       sucralfate 1 GM tablet   Commonly known as:  CARAFATE   Used for:  Gastroesophageal reflux disease with esophagitis        Dose:  1 g   Take 1 tablet (1 g) by mouth 4 times daily   Quantity:  80 tablet   Refills:  1       triamcinolone 0.1 % ointment   Commonly known as:  KENALOG   Used for:  Eczema, unspecified eczema        Apply sparingly to affected area three times daily as needed.   Quantity:  15 g   Refills:  1                Protect others around you: Learn how to safely use, store and throw away your medicines at www.disposemymeds.org.             Medication List: This is a list of all your medications and when to take them. Check marks below indicate your daily home schedule. Keep this list as a reference.      Medications           Morning Afternoon Evening Bedtime As Needed    acetaminophen 325 MG tablet   Commonly known as:  TYLENOL   Take 650 mg by mouth every 4 hours as needed for mild pain or fever Reported on 3/6/2017                                omeprazole 40 MG capsule   Commonly known as:  priLOSEC   Take 1 capsule (40 mg) by mouth daily Take 30-60 minutes before a meal.                                sucralfate 1 GM tablet   Commonly known as:  CARAFATE   Take 1 tablet (1 g) by mouth 4 times daily                                triamcinolone 0.1 %  ointment   Commonly known as:  KENALOG   Apply sparingly to affected area three times daily as needed.

## 2017-06-13 NOTE — PROGRESS NOTES
Dear Jarrod, your recent test results are attached.  The upper endoscopy was essentially normal.    Feel free to contact me via the office or My Chart if you have any questions regarding the above.  Sincerely,  DO DIAZ MajorOI

## 2017-07-28 ENCOUNTER — MYC MEDICAL ADVICE (OUTPATIENT)
Dept: FAMILY MEDICINE | Facility: OTHER | Age: 67
End: 2017-07-28

## 2017-07-28 DIAGNOSIS — K21.00 GASTROESOPHAGEAL REFLUX DISEASE WITH ESOPHAGITIS: ICD-10-CM

## 2017-07-28 RX ORDER — OMEPRAZOLE 40 MG/1
40 CAPSULE, DELAYED RELEASE ORAL DAILY
Qty: 30 CAPSULE | Refills: 1 | Status: SHIPPED | OUTPATIENT
Start: 2017-07-28 | End: 2017-11-07

## 2017-07-28 NOTE — TELEPHONE ENCOUNTER
omeprazole      Last Written Prescription Date: 4/18/17  Last Fill Quantity: 30,  # refills: 1   Last Office Visit with G, P or [facility] prescribing provider: 4/18/17

## 2017-11-07 ENCOUNTER — OFFICE VISIT (OUTPATIENT)
Dept: FAMILY MEDICINE | Facility: OTHER | Age: 67
End: 2017-11-07
Payer: COMMERCIAL

## 2017-11-07 VITALS
HEART RATE: 88 BPM | OXYGEN SATURATION: 98 % | DIASTOLIC BLOOD PRESSURE: 82 MMHG | SYSTOLIC BLOOD PRESSURE: 118 MMHG | WEIGHT: 271.9 LBS | TEMPERATURE: 97.9 F | RESPIRATION RATE: 16 BRPM | BODY MASS INDEX: 34.91 KG/M2

## 2017-11-07 DIAGNOSIS — Z13.6 CARDIOVASCULAR SCREENING; LDL GOAL LESS THAN 130: Primary | ICD-10-CM

## 2017-11-07 DIAGNOSIS — R53.83 FATIGUE, UNSPECIFIED TYPE: ICD-10-CM

## 2017-11-07 DIAGNOSIS — L20.89 OTHER ATOPIC DERMATITIS: ICD-10-CM

## 2017-11-07 DIAGNOSIS — Z00.00 ENCOUNTER FOR ROUTINE ADULT HEALTH EXAMINATION WITHOUT ABNORMAL FINDINGS: ICD-10-CM

## 2017-11-07 DIAGNOSIS — K21.00 GASTROESOPHAGEAL REFLUX DISEASE WITH ESOPHAGITIS: ICD-10-CM

## 2017-11-07 LAB
ALBUMIN UR-MCNC: NEGATIVE MG/DL
ANION GAP SERPL CALCULATED.3IONS-SCNC: 9 MMOL/L (ref 3–14)
APPEARANCE UR: CLEAR
BILIRUB UR QL STRIP: NEGATIVE
BUN SERPL-MCNC: 21 MG/DL (ref 7–30)
CALCIUM SERPL-MCNC: 8.4 MG/DL (ref 8.5–10.1)
CHLORIDE SERPL-SCNC: 109 MMOL/L (ref 94–109)
CHOLEST SERPL-MCNC: 121 MG/DL
CO2 SERPL-SCNC: 22 MMOL/L (ref 20–32)
COLOR UR AUTO: YELLOW
CREAT SERPL-MCNC: 0.96 MG/DL (ref 0.66–1.25)
ERYTHROCYTE [DISTWIDTH] IN BLOOD BY AUTOMATED COUNT: 12 % (ref 10–15)
GFR SERPL CREATININE-BSD FRML MDRD: 78 ML/MIN/1.7M2
GLUCOSE SERPL-MCNC: 93 MG/DL (ref 70–99)
GLUCOSE UR STRIP-MCNC: NEGATIVE MG/DL
HCT VFR BLD AUTO: 38.4 % (ref 40–53)
HDLC SERPL-MCNC: 50 MG/DL
HGB BLD-MCNC: 13.4 G/DL (ref 13.3–17.7)
HGB UR QL STRIP: ABNORMAL
KETONES UR STRIP-MCNC: NEGATIVE MG/DL
LDLC SERPL CALC-MCNC: 60 MG/DL
LEUKOCYTE ESTERASE UR QL STRIP: NEGATIVE
MCH RBC QN AUTO: 31.5 PG (ref 26.5–33)
MCHC RBC AUTO-ENTMCNC: 34.9 G/DL (ref 31.5–36.5)
MCV RBC AUTO: 90 FL (ref 78–100)
MUCOUS THREADS #/AREA URNS LPF: PRESENT /LPF
NITRATE UR QL: NEGATIVE
NONHDLC SERPL-MCNC: 71 MG/DL
PH UR STRIP: 6.5 PH (ref 5–7)
PLATELET # BLD AUTO: 253 10E9/L (ref 150–450)
POTASSIUM SERPL-SCNC: 4.1 MMOL/L (ref 3.4–5.3)
RBC # BLD AUTO: 4.26 10E12/L (ref 4.4–5.9)
RBC #/AREA URNS AUTO: ABNORMAL /HPF
SODIUM SERPL-SCNC: 140 MMOL/L (ref 133–144)
SOURCE: ABNORMAL
SP GR UR STRIP: 1.02 (ref 1–1.03)
TRIGL SERPL-MCNC: 53 MG/DL
UROBILINOGEN UR STRIP-ACNC: 0.2 EU/DL (ref 0.2–1)
WBC # BLD AUTO: 4 10E9/L (ref 4–11)
WBC #/AREA URNS AUTO: ABNORMAL /HPF

## 2017-11-07 PROCEDURE — 85027 COMPLETE CBC AUTOMATED: CPT | Performed by: INTERNAL MEDICINE

## 2017-11-07 PROCEDURE — 80061 LIPID PANEL: CPT | Performed by: INTERNAL MEDICINE

## 2017-11-07 PROCEDURE — G0439 PPPS, SUBSEQ VISIT: HCPCS | Performed by: INTERNAL MEDICINE

## 2017-11-07 PROCEDURE — 80048 BASIC METABOLIC PNL TOTAL CA: CPT | Performed by: INTERNAL MEDICINE

## 2017-11-07 PROCEDURE — 81001 URINALYSIS AUTO W/SCOPE: CPT | Performed by: INTERNAL MEDICINE

## 2017-11-07 PROCEDURE — 36415 COLL VENOUS BLD VENIPUNCTURE: CPT | Performed by: INTERNAL MEDICINE

## 2017-11-07 RX ORDER — OMEPRAZOLE 40 MG/1
40 CAPSULE, DELAYED RELEASE ORAL DAILY
Qty: 30 CAPSULE | Refills: 1 | Status: ON HOLD | OUTPATIENT
Start: 2017-11-07 | End: 2019-05-01

## 2017-11-07 RX ORDER — TRIAMCINOLONE ACETONIDE 1 MG/G
OINTMENT TOPICAL
Qty: 15 G | Refills: 1 | Status: SHIPPED | OUTPATIENT
Start: 2017-11-07 | End: 2019-01-22

## 2017-11-07 ASSESSMENT — PAIN SCALES - GENERAL: PAINLEVEL: NO PAIN (0)

## 2017-11-07 NOTE — NURSING NOTE
"Chief Complaint   Patient presents with     Wellness Visit       Initial /82  Pulse 88  Temp 97.9  F (36.6  C) (Temporal)  Resp 16  Wt 271 lb 14.4 oz (123.3 kg)  SpO2 98%  BMI 34.91 kg/m2 Estimated body mass index is 34.91 kg/(m^2) as calculated from the following:    Height as of 4/24/17: 6' 2\" (1.88 m).    Weight as of this encounter: 271 lb 14.4 oz (123.3 kg).  Medication Reconciliation: complete  Delphine Billy CMA (AAMA)    "

## 2017-11-07 NOTE — MR AVS SNAPSHOT
After Visit Summary   11/7/2017    Jarrod Lewis    MRN: [id number]           Patient Information     Date Of Birth          1950        Visit Information        Provider Department      11/7/2017 7:00 AM Adria Cowart DO [employer]        Today's Diagnoses     CARDIOVASCULAR SCREENING; LDL GOAL LESS THAN 130    -  1    Encounter for routine adult health examination without abnormal findings        Other atopic dermatitis        Fatigue, unspecified type        Gastroesophageal reflux disease with esophagitis           Follow-ups after your visit        Who to contact     If you have questions or need follow up information about today's clinic visit or your schedule please contact [facility] directly at [phone].  Normal or non-critical lab and imaging results will be communicated to you by MyChart, letter or phone within 4 business days after the clinic has received the results. If you do not hear from us within 7 days, please contact the clinic through [facility] or phone. If you have a critical or abnormal lab result, we will notify you by phone as soon as possible.  Submit refill requests through [employer] or call your pharmacy and they will forward the refill request to us. Please allow 3 business days for your refill to be completed.          Additional Information About Your Visit        MyChart Information     [employer] gives you secure access to your electronic health record. If you see a primary care provider, you can also send messages to your care team and make appointments. If you have questions, please call your primary care clinic.  If you do not have a primary care provider, please call [phone] and they will assist you.        Care EveryWhere ID     This is your Care EveryWhere ID. This could be used by other organizations to access your [facility] medical records  [id number]        Your Vitals Were     Pulse Temperature Respirations Pulse  Oximetry BMI (Body Mass Index)       88 97.9  F (36.6  C) (Temporal) 16 98% 34.91 kg/m2        Blood Pressure from Last 3 Encounters:   11/07/17 118/82   06/12/17 (!) 144/94   05/22/17 142/89    Weight from Last 3 Encounters:   11/07/17 271 lb 14.4 oz (123.3 kg)   04/24/17 281 lb (127.5 kg)   04/18/17 281 lb (127.5 kg)              We Performed the Following     Basic metabolic panel     CBC with platelets     Lipid Profile     UA reflex to Microscopic and Culture          Where to get your medicines      These medications were sent to [facility] - [place], [place] - [address]  [address] [place] [address]     Phone:  [phone]     omeprazole 40 MG capsule    triamcinolone 0.1 % ointment          Primary Care Provider Office Phone # Fax #    Adria Triplett DO [facility] [phone] [address] [place] [address]        Equal Access to Services     [facility] : Hadii aad ku hadasho Soomaali, waaxda luqadaha, qaybta kaalmada adeegyada, waxay idiin haycherelle graf . So [facility] [phone].    ATENCIÓN: Si habla español, tiene a ramos disposición servicios gratuitos de asistencia lingüística. Llame al [phone].    We comply with applicable federal civil rights laws and [place] laws. We do not discriminate on the basis of race, color, national origin, age, disability, sex, sexual orientation, or gender identity.            Thank you!     Thank you for choosing [facility]  for your care. Our goal is always to provide you with excellent care. Hearing back from our patients is one way we can continue to improve our services. Please take a few minutes to complete the written survey that you may receive in the mail after your visit with us. Thank you!             Your Updated Medication List - Protect others around you: Learn how to safely use, store and throw away your medicines at www.disposemymeds.org.          This list is accurate as of: 11/7/17  7:50 AM.   Always use your most recent med list.                   Brand Name Dispense Instructions for use Diagnosis    acetaminophen 325 MG tablet    TYLENOL    100 tablet    Take 650 mg by mouth every 4 hours as needed for mild pain or fever Reported on 3/6/2017        omeprazole 40 MG capsule    priLOSEC    30 capsule    Take 1 capsule (40 mg) by mouth daily Take 30-60 minutes before a meal.    Gastroesophageal reflux disease with esophagitis       triamcinolone 0.1 % ointment    KENALOG    15 g    Apply sparingly to affected area three times daily as needed.    Other atopic dermatitis

## 2017-11-07 NOTE — PROGRESS NOTES
SUBJECTIVE:   Jarrod Lewis is a 67 year old male who presents for Preventive VisitAre you in the first 12 months of your Medicare coverage?  No    Physical   Annual:     Getting at least 3 servings of Calcium per day::  Yes    Bi-annual eye exam::  Yes    Dental care twice a year::  Yes    Sleep apnea or symptoms of sleep apnea::  None    Frequency of exercise::  None    Taking medications regularly::  Yes    Additional concerns today::  No      COGNITIVE SCREEN  1) Repeat 3 items (Banana, Sunrise, Chair)    2) Clock draw: NORMAL  3) 3 item recall: Recalls 2 objects   Results: NORMAL clock, 1-2 items recalled: COGNITIVE IMPAIRMENT LESS LIKELY    Mini-CogTM Copyright S Bette. Licensed by the author for use in [facility]; reprinted with permission (madi@[email or web address]). All rights reserved.          Reviewed and updated as needed this visit by clinical staffTobacco  Allergies  Med Hx  Surg Hx  Fam Hx  Soc Hx        Reviewed and updated as needed this visit by Provider        Social History   Substance Use Topics     Smoking status: Former Smoker     Types: Pipe     Quit date: 2012     Smokeless tobacco: Never Used     Alcohol use 0.0 oz/week     0 Standard drinks or equivalent per week      Comment: occasional       The patient does not drink >3 drinks per day nor >7 drinks per week.              Today's PHQ-2 Score: PHQ-2 ( 1999 [employer]) 11/7/2017   Q1: Little interest or pleasure in doing things 0   Q2: Feeling down, depressed or hopeless 0   PHQ-2 Score 0   Q1: Little interest or pleasure in doing things Not at all   Q2: Feeling down, depressed or hopeless Not at all   PHQ-2 Score 0       Do you feel safe in your environment - Yes    Do you have a Health Care Directive?: No: Advance care planning reviewed with patient; information given to patient to review.      Current providers sharing in care for this patient include: Patient Care Team:  Adria Cowart DO as PCP - General (Internal  Medicine)      Hearing impairment: Yes, Need to ask people to speak up or repeat themselves.    Ability to successfully perform activities of daily living: Yes, no assistance needed     Fall risk:  Fallen 2 or more times in the past year?: No  Any fall with injury in the past year?: No      Home safety:  none identified      The following health maintenance items are reviewed in Epic and correct as of today:Health Maintenance   Topic Date Due     HEPATITIS C SCREENING  06/23/1969     INFLUENZA VACCINE (SYSTEM ASSIGNED)  09/01/2017     PNEUMOCOCCAL (2 of 2 - PPSV23) 03/20/2018     FALL RISK ASSESSMENT  04/18/2018     LIPID SCREEN Q5 YR MALE (SYSTEM ASSIGNED)  10/12/2020     ADVANCE DIRECTIVE PLANNING Q5 YRS  12/09/2021     TETANUS IMMUNIZATION (SYSTEM ASSIGNED)  02/11/2024     COLON CANCER SCREEN (SYSTEM ASSIGNED)  10/26/2026     AORTIC ANEURYSM SCREENING (SYSTEM ASSIGNED)  Completed     Labs reviewed in EPIC  BP Readings from Last 3 Encounters:   11/07/17 118/82   06/12/17 (!) 144/94   05/22/17 142/89    Wt Readings from Last 3 Encounters:   11/07/17 271 lb 14.4 oz (123.3 kg)   04/24/17 281 lb (127.5 kg)   04/18/17 281 lb (127.5 kg)                  Patient Active Problem List   Diagnosis     CARDIOVASCULAR SCREENING; LDL GOAL LESS THAN 130     Reducible right inguinal hernia     GI bleed     Advanced directives, counseling/discussion     Past Surgical History:   Procedure Laterality Date     C TOTAL KNEE ARTHROPLASTY Bilateral      COLONOSCOPY  12/14/2012    Procedure: COLONOSCOPY;  Colonoscopy;  Surgeon: Sylvester Lucas MD;  Location:  GI     COLONOSCOPY N/A 10/26/2016    Procedure: COMBINED COLONOSCOPY, SINGLE OR MULTIPLE BIOPSY/POLYPECTOMY BY BIOPSY;  Surgeon: Trev Oquendo MD;  Location:  GI     ESOPHAGOSCOPY, GASTROSCOPY, DUODENOSCOPY (EGD), COMBINED N/A 4/24/2017    Procedure: COMBINED ESOPHAGOSCOPY, GASTROSCOPY, DUODENOSCOPY (EGD), BIOPSY SINGLE OR MULTIPLE;  ESOPHAGOSCOPY, GASTROSCOPY,  DUODENOSCOPY (EGD) with biopsies by forceps;  Surgeon: Puma Dominguez MD;  Location: [facility]     ESOPHAGOSCOPY, GASTROSCOPY, DUODENOSCOPY (EGD), COMBINED N/A 2017    Procedure: COMBINED ESOPHAGOSCOPY, GASTROSCOPY, DUODENOSCOPY (EGD);  ESOPHAGOSCOPY, GASTROSCOPY, DUODENOSCOPY (EGD);  Surgeon: Sylvester Lucas MD;  Location: [facility]     HERNIORRHAPHY INGUINAL Right 3/8/2017    Procedure: HERNIORRHAPHY INGUINAL;  Surgeon: Kong Lassiter MD;  Location: [facility]     JOINT REPLACEMENT, HIP RT/LT Right 2012     MOHS MICROGRAPHIC PROCEDURE Left 2017    Left cheek       Social History   Substance Use Topics     Smoking status: Former Smoker     Types: Pipe     Quit date:      Smokeless tobacco: Never Used     Alcohol use 0.0 oz/week     0 Standard drinks or equivalent per week      Comment: occasional     Family History   Problem Relation Age of Onset     Stomach Cancer Father       at age 46         Current Outpatient Prescriptions   Medication Sig Dispense Refill     triamcinolone (KENALOG) 0.1 % ointment Apply sparingly to affected area three times daily as needed. 15 g 1     omeprazole (PRILOSEC) 40 MG capsule Take 1 capsule (40 mg) by mouth daily Take 30-60 minutes before a meal. 30 capsule 1     acetaminophen (TYLENOL) 325 MG tablet Take 650 mg by mouth every 4 hours as needed for mild pain or fever Reported on 3/6/2017 100 tablet              Review of Systems  C: NEGATIVE for fever, chills, change in weight  I: NEGATIVE for worrisome rashes, moles or lesions  E: NEGATIVE for vision changes or irritation  E/M: NEGATIVE for ear, mouth and throat problems  R: NEGATIVE for significant cough or SOB  B: NEGATIVE for masses, tenderness or discharge  CV: NEGATIVE for chest pain, palpitations or peripheral edema  GI: NEGATIVE for nausea, abdominal pain, heartburn, or change in bowel habits  : NEGATIVE for frequency, dysuria, or hematuria  M: NEGATIVE for significant arthralgias or myalgia  N: NEGATIVE  "for weakness, dizziness or paresthesias  E: NEGATIVE for temperature intolerance, skin/hair changes  H: NEGATIVE for bleeding problems  P: NEGATIVE for changes in mood or affect    OBJECTIVE:   /82  Pulse 88  Temp 97.9  F (36.6  C) (Temporal)  Resp 16  Wt 271 lb 14.4 oz (123.3 kg)  SpO2 98%  BMI 34.91 kg/m2 Estimated body mass index is 34.91 kg/(m^2) as calculated from the following:    Height as of 4/24/17: 6' 2\" (1.88 m).    Weight as of this encounter: 271 lb 14.4 oz (123.3 kg).  Physical Exam  GENERAL: healthy, alert and no distress  EYES: Eyes grossly normal to inspection, PERRL and conjunctivae and sclerae normal  HENT: ear canals and TM's normal, nose and mouth without ulcers or lesions  NECK: no adenopathy, no asymmetry, masses, or scars and thyroid normal to palpation  RESP: lungs clear to auscultation - no rales, rhonchi or wheezes  CV: regular rate and rhythm, normal S1 S2, no S3 or S4, no murmur, click or rub, no peripheral edema and peripheral pulses strong  ABDOMEN: soft, nontender, no hepatosplenomegaly, no masses and bowel sounds normal  MS: no gross musculoskeletal defects noted, no edema  SKIN: no suspicious lesions or rashes  NEURO: Normal strength and tone, mentation intact and speech normal  PSYCH: mentation appears normal, affect normal/bright    ASSESSMENT / PLAN:   1. Encounter for routine adult health examination without abnormal findings  Unremarkable    2. CARDIOVASCULAR SCREENING; LDL GOAL LESS THAN 130    - Basic metabolic panel  - UA reflex to Microscopic and Culture  - Lipid Profile    3. Other atopic dermatitis  Controlled with occasional use of triamcinolone  - triamcinolone (KENALOG) 0.1 % ointment; Apply sparingly to affected area three times daily as needed.  Dispense: 15 g; Refill: 1    4. Fatigue, unspecified type  Modest and intermittent  - CBC with platelets    5. Gastroesophageal reflux disease with esophagitis  Controlled with omeprazole  - omeprazole (PRILOSEC) " "40 MG capsule; Take 1 capsule (40 mg) by mouth daily Take 30-60 minutes before a meal.  Dispense: 30 capsule; Refill: 1    End of Life Planning:  Patient currently has an advanced directive: No.  I have verified the patient's ablity to prepare an advanced directive/make health care decisions.  Literature was provided to assist patient in preparing an advanced directive.    COUNSELING:  Reviewed preventive health counseling, as reflected in patient instructions       Regular exercise       Healthy diet/nutrition       Vision screening       Hearing screening       Dental care        Estimated body mass index is 34.91 kg/(m^2) as calculated from the following:    Height as of 4/24/17: 6' 2\" (1.88 m).    Weight as of this encounter: 271 lb 14.4 oz (123.3 kg).     reports that he quit smoking about 5 years ago. His smoking use included Pipe. He has never used smokeless tobacco.        Appropriate preventive services were discussed with this patient, including applicable screening as appropriate for cardiovascular disease, diabetes, osteopenia/osteoporosis, and glaucoma.  As appropriate for age/gender, discussed screening for colorectal cancer, prostate cancer, breast cancer, and cervical cancer. Checklist reviewing preventive services available has been given to the patient.    Reviewed patients plan of care and provided an AVS. The Basic Care Plan (routine screening as documented in Health Maintenance) for Jarrod meets the Care Plan requirement. This Care Plan has been established and reviewed with the Patient.    Counseling Resources:  ATP IV Guidelines  Pooled Cohorts Equation Calculator  Breast Cancer Risk Calculator  FRAX Risk Assessment  ICSI Preventive Guidelines  Dietary Guidelines for Americans, 2010  USDA's MyPlate  ASA Prophylaxis  Lung CA Screening    Adria Cowart,   [facility]"

## 2017-11-09 NOTE — PROGRESS NOTES
Dear Jarrod, your recent test results are attached.   Your urinary test shows no infection.  Your chemistry panel shows that you could drink a little more water.  Your cholesterol is well controlled with an LDL of 60.  Your blood cell count is normal.    Feel free to contact me via the office or My Chart if you have any questions regarding the above.  Sincerely,  Adria Cowart,  FACOI

## 2017-12-18 ENCOUNTER — MYC MEDICAL ADVICE (OUTPATIENT)
Dept: FAMILY MEDICINE | Facility: OTHER | Age: 67
End: 2017-12-18

## 2017-12-18 DIAGNOSIS — J01.00 ACUTE NON-RECURRENT MAXILLARY SINUSITIS: Primary | ICD-10-CM

## 2017-12-18 RX ORDER — DOXYCYCLINE 100 MG/1
100 CAPSULE ORAL 2 TIMES DAILY
Qty: 20 CAPSULE | Refills: 0 | Status: SHIPPED | OUTPATIENT
Start: 2017-12-18 | End: 2019-01-09

## 2018-07-26 ENCOUNTER — MYC MEDICAL ADVICE (OUTPATIENT)
Dept: FAMILY MEDICINE | Facility: OTHER | Age: 68
End: 2018-07-26

## 2018-07-26 DIAGNOSIS — J01.90 ACUTE SINUSITIS WITH SYMPTOMS GREATER THAN 10 DAYS: Primary | ICD-10-CM

## 2018-07-26 RX ORDER — MINOCYCLINE HYDROCHLORIDE 100 MG/1
100 CAPSULE ORAL 2 TIMES DAILY
Qty: 14 CAPSULE | Refills: 0 | Status: SHIPPED | OUTPATIENT
Start: 2018-07-26 | End: 2019-01-09

## 2018-07-26 NOTE — TELEPHONE ENCOUNTER
A prescription for minocycline has been called to his pharmacy.  Please make sure the patient is aware that he should avoid direct sunlight or going outdoors without adequate clothing and sunscreen.    [facility]

## 2019-01-09 ENCOUNTER — OFFICE VISIT (OUTPATIENT)
Dept: ALLERGY | Facility: OTHER | Age: 69
End: 2019-01-09
Payer: COMMERCIAL

## 2019-01-09 VITALS
BODY MASS INDEX: 36.96 KG/M2 | HEART RATE: 77 BPM | OXYGEN SATURATION: 96 % | SYSTOLIC BLOOD PRESSURE: 126 MMHG | WEIGHT: 288 LBS | TEMPERATURE: 97.7 F | RESPIRATION RATE: 16 BRPM | HEIGHT: 74 IN | DIASTOLIC BLOOD PRESSURE: 86 MMHG

## 2019-01-09 DIAGNOSIS — J30.89 ALLERGIC RHINITIS DUE TO MOLD: ICD-10-CM

## 2019-01-09 DIAGNOSIS — J30.89 ALLERGIC RHINITIS DUE TO DUST MITE: ICD-10-CM

## 2019-01-09 DIAGNOSIS — J30.1 SEASONAL ALLERGIC RHINITIS DUE TO POLLEN: ICD-10-CM

## 2019-01-09 DIAGNOSIS — Z88.0 PENICILLIN ALLERGY: ICD-10-CM

## 2019-01-09 DIAGNOSIS — J34.89 SINUS PRESSURE: Primary | ICD-10-CM

## 2019-01-09 PROCEDURE — 99204 OFFICE O/P NEW MOD 45 MIN: CPT | Mod: 25 | Performed by: ALLERGY & IMMUNOLOGY

## 2019-01-09 PROCEDURE — 95004 PERQ TESTS W/ALRGNC XTRCS: CPT | Performed by: ALLERGY & IMMUNOLOGY

## 2019-01-09 RX ORDER — TRIAMCINOLONE ACETONIDE 55 UG/1
2 SPRAY, METERED NASAL DAILY
Qty: 1 BOTTLE | Refills: 3 | Status: SHIPPED | OUTPATIENT
Start: 2019-01-09 | End: 2020-03-20

## 2019-01-09 ASSESSMENT — MIFFLIN-ST. JEOR: SCORE: 2151.11

## 2019-01-09 NOTE — ASSESSMENT & PLAN NOTE
Hives after receiving penicillin antibiotic in childhood.  He has subsequently avoided.    - Discussed with patient and family that only about 15% of those that think they are allergic actually are and of those 15%, 80% outgrow their penicillin allergy within 10 years.   - Fortunately, there is skin testing to ascertain if patient is truly allergic.   - Would recommend patient return to clinic for penicillin testing and oral challenge. Discussed testing with family and patient.

## 2019-01-09 NOTE — PROGRESS NOTES
Jarrod Lewis is a 68 year old White male with previous medical history significant for gerd and penicillin allergy. Jarrod Lewis is being seen today for evaluation of allergies to medication and postnasal drip.     The patient reports that for years he has had nasal symptoms if he is exposed to dust.  Over the course of the last year he has had persistent symptoms.  These have occurred at any time of the year.  Symptoms are intermittent and not always present.  He estimates there present about 50% of the time.  Symptoms include rhinorrhea, sinus pressure, postnasal drainage.  Denies nasal congestion.  Denies sneezing, nasal itching or ocular symptoms.  Possibly increased symptoms around dry leaves.  Increase symptoms around mowing grass.  2 sinus infections per year.  Sinus surgery 10 years ago.  No history of nasal polyposis.  He reports his sinus surgery was to repair an injury sustained multiple years ago.  He has used Flonase on an as-needed basis and found not to be beneficial.  Sudafed is sometimes helpful.  Claritin has not been beneficial.  Afrin was helpful but only used temporarily.  No problems around cats, dogs.  No triggers of smoke or scents.  No history of allergy testing.    Patient reports that he had hives after receiving penicillin in childhood.  He has subsequently avoided penicillin antibiotics.    The patient has no history of asthma, eczema, food allergies,  or hives.     ENVIRONMENTAL HISTORY: [place] The [place] is heated with a forced air. They does not have central air conditioning. The patient's bedroom is furnished with Indoor plants and hard danny in bedroom.  Pets inside the house include 0 pets. There is history of cockroach or mice infestation. There is/are 0 smokers in the house.  The house does not have a damp basement.       Past Medical History:   Diagnosis Date     Arthritis      Status post Mohs surgery for squamous cell carcinoma in  situ of skin 2017    Left cheek     Family History   Problem Relation Age of Onset     Stomach Cancer Father          at age 46     Past Surgical History:   Procedure Laterality Date     C TOTAL KNEE ARTHROPLASTY Bilateral      COLONOSCOPY  2012    Procedure: COLONOSCOPY;  Colonoscopy;  Surgeon: Sylvester Lucas MD;  Location:  GI     COLONOSCOPY N/A 10/26/2016    Procedure: COMBINED COLONOSCOPY, SINGLE OR MULTIPLE BIOPSY/POLYPECTOMY BY BIOPSY;  Surgeon: Trev Oquendo MD;  Location:  GI     ESOPHAGOSCOPY, GASTROSCOPY, DUODENOSCOPY (EGD), COMBINED N/A 2017    Procedure: COMBINED ESOPHAGOSCOPY, GASTROSCOPY, DUODENOSCOPY (EGD), BIOPSY SINGLE OR MULTIPLE;  ESOPHAGOSCOPY, GASTROSCOPY, DUODENOSCOPY (EGD) with biopsies by forceps;  Surgeon: Puma Dominguez MD;  Location:  GI     ESOPHAGOSCOPY, GASTROSCOPY, DUODENOSCOPY (EGD), COMBINED N/A 2017    Procedure: COMBINED ESOPHAGOSCOPY, GASTROSCOPY, DUODENOSCOPY (EGD);  ESOPHAGOSCOPY, GASTROSCOPY, DUODENOSCOPY (EGD);  Surgeon: Sylvester Lucas MD;  Location:  GI     HERNIORRHAPHY INGUINAL Right 3/8/2017    Procedure: HERNIORRHAPHY INGUINAL;  Surgeon: Kong Lassiter MD;  Location:  OR     JOINT REPLACEMENT, HIP RT/LT Right 2012     MOHS MICROGRAPHIC PROCEDURE Left 2017    Left cheek       REVIEW OF SYSTEMS:  General: negative for weight gain. negative for weight loss. negative for changes in sleep.   Ears: negative for fullness. negative for hearing loss. negative for dizziness.   Nose: negative for snoring.negative for changes in smell. negative for drainage.   Eyes: negative for eye watering. negative for eye itching. negative for vision changes. negative for eye redness.  Throat: negative for hoarseness. negative for sore throat. negative for trouble swallowing.   Lungs: negative for shortness of breath.negative for wheezing. negative for sputum production.   Cardiovascular: negative for chest pain. negative for  swelling of ankles. negative for fast or irregular heartbeat.   Gastrointestinal: negative for nausea. negative for heartburn. negative for acid reflux.   Musculoskeletal: positive  for joint pain. positive  for joint stiffness. negative for joint swelling.   Neurologic: negative for seizures. negative for fainting. negative for weakness.   Psychiatric: negative for changes in mood. negative for anxiety.   Endocrine: negative for cold intolerance. negative for heat intolerance. negative for tremors.   Lymphatic: negative for lower extremity swelling. negative for lymph node swelling.   Hematologic: negative for easy bruising. negative for easy bleeding.  Integumentary: negative for rash. negative for scaling. negative for nail changes.       Current Outpatient Medications:      acetaminophen (TYLENOL) 325 MG tablet, Take 650 mg by mouth every 4 hours as needed for mild pain or fever Reported on 3/6/2017, Disp: 100 tablet, Rfl:      omeprazole (PRILOSEC) 40 MG capsule, Take 1 capsule (40 mg) by mouth daily Take 30-60 minutes before a meal., Disp: 30 capsule, Rfl: 1     Pseudoephedrine HCl (SUDAFED CONGESTION PO), Take by mouth daily as needed, Disp: , Rfl:      triamcinolone (KENALOG) 0.1 % ointment, Apply sparingly to affected area three times daily as needed., Disp: 15 g, Rfl: 1     triamcinolone (NASACORT) 55 MCG/ACT nasal aerosol, Spray 2 sprays into both nostrils daily, Disp: 1 Bottle, Rfl: 3  Immunization History   Administered Date(s) Administered     Pneumo Conj 13-V (2010&after) 03/20/2017     TD (ADULT, 7+) 02/11/2014     TDAP Vaccine (Adacel) 10/15/2007     Allergies   Allergen Reactions     Penicillins Unknown     As a child       Zithromax [Azithromycin Dihydrate]      diarrhea         EXAM:   Constitutional:  Appears well-developed and well-nourished. No distress.   HEENT:   Head: Normocephalic.   Mouth/Throat:   No cobblestoning of posterior oropharynx.   Nasal tissue pink and normal appearing.  No  rhinorrhea noted.    Eyes: Conjunctivae are non-erythematous   No maxillary or frontal sinus tenderness to palpation.   Cardiovascular: Normal rate, regular rhythm and normal heart sounds. Exam reveals no gallop and no friction rub.   No murmur heard.  Respiratory: Effort normal and breath sounds normal. No respiratory distress. No wheezes. No rales.   Musculoskeletal: Normal range of motion.   Neuro: Oriented to person, place, and time.  Skin: Skin is warm and dry. No rash noted.   Psychiatric: Normal mood and affect.     Nursing note and vitals reviewed.      WORKUP:   ENVIRONMENTAL PERCUTANEOUS SKIN TESTING: ADULT  [facility] 1/9/2019   Consent Y   Ordering Physician Dr. Trivedi   Interpreting Physician Dr. Trivedi   Testing Technician Leslye   Location Back   Time start:  7:48 AM   Time End:  8:03 AM   Positive Control: Histatrol*ALK 1 mg/ml 4/4   Negative Control: 50% Glycerin 0   Cat Hair*ALK (10,000 BAU/ml) 0   AP Dog Hair/Dander (1:100 w/v) 0   Dust Mite p. 30,000 AU/ml 12/22   Dust Mite f. (30,000 AU/ml) 5/5   Reji (W/F in millimeters) 0   Grass Mix #7 (100,000 BAU/ml) 0   Red Cedar (W/F in millimeters) 0   Maple/Jackson (W/F in millimeters) 0   Hackberry (W/F in millimeters) 0   [place] (W/F in millimeters) 0   [place]an *ALK (W/F in millimeters) 0   American Elm (W/F in millimeters) 0   [place]edar [place] (W/F in millimeters) 0   [place] (W/F in millimeters) 0   Birch Mix (W/F in millimeters) 0   [place] (W/F in millimeters) 0   Oak (W/F in millimeters) 7/17   Cocklebur (W/F in millimeters) 0   [place] (W/F in millimeters) 0   [place] (W/F in millimeters) 0   Careless (W/F in millimeters) 0   Nettle (W/F in millimeters) 0   English Plantain (W/F in millimeters) 0   Kochia (W/F in millimeters) 5/8   Lamb's Quarter (W/F in millimeters) 0   Marshelder (W/F in millimeters) 0   Ragweed Mix* ALK (W/F in millimeters) 6/8   Russian Thistle (W/F in millimeters) 0   Sagebrush/Mugwort (W/F in  millimeters) 0   Sheep Sorrel (W/F in millimeters) 0   Feather Mix* ALK (W/F in millimeters) 0   Penicillium Mix (1:10 w/v) 0   Curvularia spicifera (1:10 w/v) 0   Epicoccum (1:10 w/v) 7/12   Aspergillus fumigatus (1:10 w/v): 0   Alternaria tenius (1:10 w/v) 6/11   H. Cladosporium (1:10 w/v) 0   Phoma herbarum (1:10 w/v) 5/5          ASSESSMENT/PLAN:  Problem List Items Addressed This Visit        Respiratory    Allergic rhinitis due to dust mite     Intermittent rhinorrhea, postnasal drainage and sinus pressure.  Symptoms worse of the last 1 year.  Increase symptoms around mowing grass, dust and dried leaves.  Flonase used intermittently.  Claritin not helpful.  Sudafed somewhat beneficial.  Sinus surgery 10 years ago.    Allergy skin testing:  Positive for dust mites, trees, weeds and molds.    -Start using Nasacort consistently 2 sprays per nostril daily.  -Sinus rinses daily.  -Cetirizine, fexofenadine or levocetirizine daily as needed.         Relevant Medications    Pseudoephedrine HCl (SUDAFED CONGESTION PO)    triamcinolone (NASACORT) 55 MCG/ACT nasal aerosol    Other Relevant Orders    ALLERGY SKIN TESTS,ALLERGENS [23441] (Completed)    Sinus pressure - Primary    Relevant Medications    triamcinolone (NASACORT) 55 MCG/ACT nasal aerosol    Other Relevant Orders    ALLERGY SKIN TESTS,ALLERGENS [86891] (Completed)    Seasonal allergic rhinitis due to pollen    Relevant Medications    Pseudoephedrine HCl (SUDAFED CONGESTION PO)    triamcinolone (NASACORT) 55 MCG/ACT nasal aerosol    Allergic rhinitis due to mold    Relevant Medications    Pseudoephedrine HCl (SUDAFED CONGESTION PO)    triamcinolone (NASACORT) 55 MCG/ACT nasal aerosol       Other    Penicillin allergy     Hives after receiving penicillin antibiotic in childhood.  He has subsequently avoided.    - Discussed with patient and family that only about 15% of those that think they are allergic actually are and of those 15%, 80% outgrow their  penicillin allergy within 10 years.   - Fortunately, there is skin testing to ascertain if patient is truly allergic.   - Would recommend patient return to clinic for penicillin testing and oral challenge. Discussed testing with family and patient.            Relevant Medications    Pseudoephedrine HCl (SUDAFED CONGESTION PO)    triamcinolone (NASACORT) 55 MCG/ACT nasal aerosol        Return to clinic in 4-6 weeks.    Chart documentation with Dragon Voice recognition Software. Although reviewed after completion, some words and grammatical errors may remain.    Henri Trivedi,    Allergy/Immunology  [facility] [place] [facility] [place]

## 2019-01-09 NOTE — PATIENT INSTRUCTIONS
Allergy Staff Appt Hours Shot Hours Locations    Physician     Henri Trivedi DO       Support Staff     BRIANNA Peña CMA  Monday:                      [place] 8-7     Tuesday:         [place] 8-5     Wednesday:        [place]: 7-5     Friday:        [place] 7-5   [place]        Monday: 9-5:50        Wednesday: 2-5:50        Friday: 7-12:50     [place]        Tuesday: 7-10:50        Thursday: 1:30-6:30     [place] Monday: 7:10-4:50        Tuesday: 12:30-6:30        Thursday: 7-11:50 [facility]  [address] [place] [address]  Appt Line: [phone]  Allergy RN (Monday):  [phone]    [facility]  [address] [place] [address]  Appt Line: [phone]  Allergy RN (Tues & Wed):  [phone]    [facility]  [address] [place] [address]  Appt Line: [phone]  Allergy RN (Friday):  [phone]       Important Scheduling Information  Aspirin Desensitization: Appt will last 2 clinic days. Please call the Allergy RN line for your clinic to schedule. Discontinue antihistamines 7 days prior to the appointment.     Food Challenges: Appt will last 3-4 hours. Please call the Allergy RN line for your clinic to schedule. Discontinue antihistamines 7 days prior to the appointment.     Penicillin Testing: Appt will last 2-3 hours. Please call the Allergy RN line for your clinic to schedule. Discontinue antihistamines 7 days prior to the appointment.     Skin Testing: Appt will about 40 minutes. Call the appointment line for your clinic to schedule. Discontinue antihistamines 7 days prior to the appointment.     Venom Testing: Appt will last 2-3 hours. Please call the Allergy RN line for your clinic to schedule. Discontinue antihistamines 7 days prior to the appointment.     Thank you for trusting us with your Allergy, Asthma, and Immunology care. Please feel free to contact us with any questions or concerns you may have.      -Start using Nasacort  consistently 2 sprays per nostril daily.  -Sinus rinses daily.  -Cetirizine, fexofenadine or levocetirizine daily as needed.  -Return to clinic in 4-6 weeks.    AEROALLERGEN AVOIDANCE INSTRUCTIONS  MOLD  Indoors, mold season is year round. Outdoors, most mold prefer seasons with high humidity. Mold prefers damp, dark, warm places. Here are some tips on how to avoid mold exposure.  1.  Keep humidity inside between 35-50% with air conditioning or dehumidifier. The humidity level can be checked with a meter from a hardware store.   2.  Clean surfaces where mold grows and dry wet areas.  3.  Avoid steam cleaning carpets and discard moldy belongings.  4.  Wear a mask when doing yard work and refrain from walking through uncut fields or playing in leaves.  5.  Minimize use of potted plants and do not keep them indoors.  6.  Consider an allergy cover for the pillow and mattress.  POLLEN  Pollens are the tiny airborne particles given off by trees, weeds, and grasses. They can be the cause of seasonal allergic rhinitis or hay fever symptoms, which include stuffy, itchy, runny nose, redness, swelling and itching of the eyes, and itching of the ears and throat. Here are some tips on how to avoid pollen exposure.  1. .Keep windows closed and use the air conditioner when possible.  2.  Avoid outside exposure in the early morning as pollen counts are highest at that time.  3.  Take a shower and wash hair each night.  4.  Consider wearing a mask when working in the yard and/or garden.  5.  Clean furnace filter monthly with HEPA filters. Consider a HEPA filter vacuum  which will prevent pollen from being reintroduced into the air.   DUST MITES  Dust mites can never be entirely eliminated in the house no matter how clean your house is. Dust mites are attracted to warm, moist areas and feed on dead skin flakes. Here are tips to minimize dust mites in your home.  1.  Encase pillows and mattress/box springs in zippered allergy  covers.  2.  Wash bedding in hot water (at least 130 F) every 7-14 days.  3.  Avoid curtains, carpet, and upholstered furniture if possible.  4.  Use HEPA air filters and a HEPA filter vacuum . Change filters monthly. Vacuum weekly.  5.  Keep bedroom simple, avoiding clutter, so it can quickly be dusted.  6.  Cover heating vents with vent filters.  7.  Keep stuffed toys in a closed container and wash or freeze regularly.  8.  Keep clothing in the closet with the door closed.

## 2019-01-09 NOTE — ASSESSMENT & PLAN NOTE
Intermittent rhinorrhea, postnasal drainage and sinus pressure.  Symptoms worse of the last 1 year.  Increase symptoms around mowing grass, dust and dried leaves.  Flonase used intermittently.  Claritin not helpful.  Sudafed somewhat beneficial.  Sinus surgery 10 years ago.    Allergy skin testing:  Positive for dust mites, trees, weeds and molds.    -Start using Nasacort consistently 2 sprays per nostril daily.  -Sinus rinses daily.  -Cetirizine, fexofenadine or levocetirizine daily as needed.

## 2019-01-09 NOTE — LETTER
1/9/2019         RE: Jarrod Lewis  [address]  [place] [address]        Dear Colleague,    Thank you for referring your patient, Jarrod Lewis, to the [facility]. Please see a copy of my visit note below.    Jarrod Lewis is a 68 year old White male with previous medical history significant for gerd and penicillin allergy. Jarrod Lewis is being seen today for evaluation of allergies to medication and postnasal drip.     The patient reports that for years he has had nasal symptoms if he is exposed to dust.  Over the course of the last year he has had persistent symptoms.  These have occurred at any time of the year.  Symptoms are intermittent and not always present.  He estimates there present about 50% of the time.  Symptoms include rhinorrhea, sinus pressure, postnasal drainage.  Denies nasal congestion.  Denies sneezing, nasal itching or ocular symptoms.  Possibly increased symptoms around dry leaves.  Increase symptoms around mowing grass.  2 sinus infections per year.  Sinus surgery 10 years ago.  No history of nasal polyposis.  He reports his sinus surgery was to repair an injury sustained multiple years ago.  He has used Flonase on an as-needed basis and found not to be beneficial.  Sudafed is sometimes helpful.  Claritin has not been beneficial.  Afrin was helpful but only used temporarily.  No problems around cats, dogs.  No triggers of smoke or scents.  No history of allergy testing.    Patient reports that he had hives after receiving penicillin in childhood.  He has subsequently avoided penicillin antibiotics.    The patient has no history of asthma, eczema, food allergies,  or hives.     ENVIRONMENTAL HISTORY: [place] The [place] is heated with a forced air. They does not have central air conditioning. The patient's bedroom is furnished with Indoor plants and hard danny in bedroom.  Pets inside the house include 0 pets. There is history  of cockroach or mice infestation. There is/are 0 smokers in the house.  The house does not have a damp basement.       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    Left cheek     Family History   Problem Relation Age of Onset     Stomach Cancer Father          at age 46     Past Surgical History:   Procedure Laterality Date     C TOTAL KNEE ARTHROPLASTY Bilateral      COLONOSCOPY  2012    Procedure: COLONOSCOPY;  Colonoscopy;  Surgeon: Sylvester Lucas MD;  Location:  GI     COLONOSCOPY N/A 10/26/2016    Procedure: COMBINED COLONOSCOPY, SINGLE OR MULTIPLE BIOPSY/POLYPECTOMY BY BIOPSY;  Surgeon: Trev Oquendo MD;  Location: [facility]     ESOPHAGOSCOPY, GASTROSCOPY, DUODENOSCOPY (EGD), COMBINED N/A 2017    Procedure: COMBINED ESOPHAGOSCOPY, GASTROSCOPY, DUODENOSCOPY (EGD), BIOPSY SINGLE OR MULTIPLE;  ESOPHAGOSCOPY, GASTROSCOPY, DUODENOSCOPY (EGD) with biopsies by forceps;  Surgeon: Puma Dominguez MD;  Location:  GI     ESOPHAGOSCOPY, GASTROSCOPY, DUODENOSCOPY (EGD), COMBINED N/A 2017    Procedure: COMBINED ESOPHAGOSCOPY, GASTROSCOPY, DUODENOSCOPY (EGD);  ESOPHAGOSCOPY, GASTROSCOPY, DUODENOSCOPY (EGD);  Surgeon: Sylvester Lucas MD;  Location:  GI     HERNIORRHAPHY INGUINAL Right 3/8/2017    Procedure: HERNIORRHAPHY INGUINAL;  Surgeon: Kong Lassiter MD;  Location: [facility]     JOINT REPLACEMENT, HIP RT/LT Right 2012     MOHS MICROGRAPHIC PROCEDURE Left 2017    Left cheek       REVIEW OF SYSTEMS:  General: negative for weight gain. negative for weight loss. negative for changes in sleep.   Ears: negative for fullness. negative for hearing loss. negative for dizziness.   Nose: negative for snoring.negative for changes in smell. negative for drainage.   Eyes: negative for eye watering. negative for eye itching. negative for vision changes. negative for eye redness.  Throat: negative  for hoarseness. negative for sore throat. negative for trouble swallowing.   Lungs: negative for shortness of breath.negative for wheezing. negative for sputum production.   Cardiovascular: negative for chest pain. negative for swelling of ankles. negative for fast or irregular heartbeat.   Gastrointestinal: negative for nausea. negative for heartburn. negative for acid reflux.   Musculoskeletal: positive  for joint pain. positive  for joint stiffness. negative for joint swelling.   Neurologic: negative for seizures. negative for fainting. negative for weakness.   Psychiatric: negative for changes in mood. negative for anxiety.   Endocrine: negative for cold intolerance. negative for heat intolerance. negative for tremors.   Lymphatic: negative for lower extremity swelling. negative for lymph node swelling.   Hematologic: negative for easy bruising. negative for easy bleeding.  Integumentary: negative for rash. negative for scaling. negative for nail changes.       Current Outpatient Medications:      acetaminophen (TYLENOL) 325 MG tablet, Take 650 mg by mouth every 4 hours as needed for mild pain or fever Reported on 3/6/2017, Disp: 100 tablet, Rfl:      omeprazole (PRILOSEC) 40 MG capsule, Take 1 capsule (40 mg) by mouth daily Take 30-60 minutes before a meal., Disp: 30 capsule, Rfl: 1     Pseudoephedrine HCl (SUDAFED CONGESTION PO), Take by mouth daily as needed, Disp: , Rfl:      triamcinolone (KENALOG) 0.1 % ointment, Apply sparingly to affected area three times daily as needed., Disp: 15 g, Rfl: 1     triamcinolone (NASACORT) 55 MCG/ACT nasal aerosol, Spray 2 sprays into both nostrils daily, Disp: 1 Bottle, Rfl: 3  Immunization History   Administered Date(s) Administered     Pneumo Conj 13-V (2010&after) 03/20/2017     TD (ADULT, 7+) 02/11/2014     TDAP Vaccine (Adacel) 10/15/2007     Allergies   Allergen Reactions     Penicillins Unknown     As a child       Zithromax [Azithromycin Dihydrate]      diarrhea          EXAM:   Constitutional:  Appears well-developed and well-nourished. No distress.   HEENT:   Head: Normocephalic.   Mouth/Throat:   No cobblestoning of posterior oropharynx.   Nasal tissue pink and normal appearing.  No rhinorrhea noted.    Eyes: Conjunctivae are non-erythematous   No maxillary or frontal sinus tenderness to palpation.   Cardiovascular: Normal rate, regular rhythm and normal heart sounds. Exam reveals no gallop and no friction rub.   No murmur heard.  Respiratory: Effort normal and breath sounds normal. No respiratory distress. No wheezes. No rales.   Musculoskeletal: Normal range of motion.   Neuro: Oriented to person, place, and time.  Skin: Skin is warm and dry. No rash noted.   Psychiatric: Normal mood and affect.     Nursing note and vitals reviewed.      WORKUP:   ENVIRONMENTAL PERCUTANEOUS SKIN TESTING: ADULT  [facility] 1/9/2019   Consent Y   Ordering Physician Dr. Trivedi   Interpreting Physician Dr. Trivedi   Testing Technician Leslye   Location Back   Time start:  7:48 AM   Time End:  8:03 AM   Positive Control: Histatrol*ALK 1 mg/ml 4/4   Negative Control: 50% Glycerin 0   Cat Hair*ALK (10,000 BAU/ml) 0   AP Dog Hair/Dander (1:100 w/v) 0   Dust Mite p. 30,000 AU/ml 12/22   Dust Mite f. (30,000 AU/ml) 5/5   Reji (W/F in millimeters) 0   Grass Mix #7 (100,000 BAU/ml) 0   Red Cedar (W/F in millimeters) 0   Maple/Drummonds (W/F in millimeters) 0   Hackberry (W/F in millimeters) 0   Nunez (W/F in millimeters) 0   Bamberg *ALK (W/F in millimeters) 0   American Elm (W/F in millimeters) 0   [place] (W/F in millimeters) 0   [place] (W/F in millimeters) 0   Birch Mix (W/F in millimeters) 0   [place] (W/F in millimeters) 0   Oak (W/F in millimeters) 7/17   Cocklebur (W/F in millimeters) 0   Flushing (W/F in millimeters) 0   White Regis (W/F in millimeters) 0   Careless (W/F in millimeters) 0   Nettle (W/F in millimeters) 0   English Plantain (W/F in millimeters) 0    Kochia (W/F in millimeters) 5/8   Lamb's Quarter (W/F in millimeters) 0   Marshelder (W/F in millimeters) 0   Ragweed Mix* ALK (W/F in millimeters) 6/8   Russian Thistle (W/F in millimeters) 0   Sagebrush/Mugwort (W/F in millimeters) 0   Sheep Sorrel (W/F in millimeters) 0   Feather Mix* ALK (W/F in millimeters) 0   Penicillium Mix (1:10 w/v) 0   Curvularia spicifera (1:10 w/v) 0   Epicoccum (1:10 w/v) 7/12   Aspergillus fumigatus (1:10 w/v): 0   Alternaria tenius (1:10 w/v) 6/11   H. Cladosporium (1:10 w/v) 0   Phoma herbarum (1:10 w/v) 5/5          ASSESSMENT/PLAN:  Problem List Items Addressed This Visit        Respiratory    Allergic rhinitis due to dust mite     Intermittent rhinorrhea, postnasal drainage and sinus pressure.  Symptoms worse of the last 1 year.  Increase symptoms around mowing grass, dust and dried leaves.  Flonase used intermittently.  Claritin not helpful.  Sudafed somewhat beneficial.  Sinus surgery 10 years ago.    Allergy skin testing:  Positive for dust mites, trees, weeds and molds.    -Start using Nasacort consistently 2 sprays per nostril daily.  -Sinus rinses daily.  -Cetirizine, fexofenadine or levocetirizine daily as needed.         Relevant Medications    Pseudoephedrine HCl (SUDAFED CONGESTION PO)    triamcinolone (NASACORT) 55 MCG/ACT nasal aerosol    Other Relevant Orders    ALLERGY SKIN TESTS,ALLERGENS [88401] (Completed)    Sinus pressure - Primary    Relevant Medications    triamcinolone (NASACORT) 55 MCG/ACT nasal aerosol    Other Relevant Orders    ALLERGY SKIN TESTS,ALLERGENS [46344] (Completed)    Seasonal allergic rhinitis due to pollen    Relevant Medications    Pseudoephedrine HCl (SUDAFED CONGESTION PO)    triamcinolone (NASACORT) 55 MCG/ACT nasal aerosol    Allergic rhinitis due to mold    Relevant Medications    Pseudoephedrine HCl (SUDAFED CONGESTION PO)    triamcinolone (NASACORT) 55 MCG/ACT nasal aerosol       Other    Penicillin allergy     Hives after  receiving penicillin antibiotic in childhood.  He has subsequently avoided.    - Discussed with patient and family that only about 15% of those that think they are allergic actually are and of those 15%, 80% outgrow their penicillin allergy within 10 years.   - Fortunately, there is skin testing to ascertain if patient is truly allergic.   - Would recommend patient return to clinic for penicillin testing and oral challenge. Discussed testing with family and patient.            Relevant Medications    Pseudoephedrine HCl (SUDAFED CONGESTION PO)    triamcinolone (NASACORT) 55 MCG/ACT nasal aerosol        Return to clinic in 4-6 weeks.    Chart documentation with Dragon Voice recognition Software. Although reviewed after completion, some words and grammatical errors may remain.    Henri Trivedi, DO   Allergy/Immunology  [facility]      Again, thank you for allowing me to participate in the care of your patient.        Sincerely,        Henri Trivedi, DO

## 2019-01-22 ENCOUNTER — OFFICE VISIT (OUTPATIENT)
Dept: FAMILY MEDICINE | Facility: OTHER | Age: 69
End: 2019-01-22
Payer: COMMERCIAL

## 2019-01-22 VITALS
RESPIRATION RATE: 20 BRPM | TEMPERATURE: 97.7 F | HEIGHT: 73 IN | BODY MASS INDEX: 38.43 KG/M2 | SYSTOLIC BLOOD PRESSURE: 134 MMHG | OXYGEN SATURATION: 97 % | DIASTOLIC BLOOD PRESSURE: 80 MMHG | HEART RATE: 88 BPM | WEIGHT: 290 LBS

## 2019-01-22 DIAGNOSIS — M70.71 BURSITIS OF RIGHT HIP, UNSPECIFIED BURSA: ICD-10-CM

## 2019-01-22 DIAGNOSIS — Z12.5 SCREENING FOR PROSTATE CANCER: ICD-10-CM

## 2019-01-22 DIAGNOSIS — L20.89 OTHER ATOPIC DERMATITIS: ICD-10-CM

## 2019-01-22 DIAGNOSIS — Z11.59 NEED FOR HEPATITIS C SCREENING TEST: ICD-10-CM

## 2019-01-22 DIAGNOSIS — E78.5 HYPERLIPIDEMIA LDL GOAL <130: ICD-10-CM

## 2019-01-22 DIAGNOSIS — Z00.00 ENCOUNTER FOR ROUTINE ADULT HEALTH EXAMINATION WITHOUT ABNORMAL FINDINGS: Primary | ICD-10-CM

## 2019-01-22 LAB
ANION GAP SERPL CALCULATED.3IONS-SCNC: 8 MMOL/L (ref 3–14)
BUN SERPL-MCNC: 27 MG/DL (ref 7–30)
CALCIUM SERPL-MCNC: 8.5 MG/DL (ref 8.5–10.1)
CHLORIDE SERPL-SCNC: 108 MMOL/L (ref 94–109)
CHOLEST SERPL-MCNC: 145 MG/DL
CO2 SERPL-SCNC: 25 MMOL/L (ref 20–32)
CREAT SERPL-MCNC: 1.02 MG/DL (ref 0.66–1.25)
ERYTHROCYTE [DISTWIDTH] IN BLOOD BY AUTOMATED COUNT: 12.8 % (ref 10–15)
GFR SERPL CREATININE-BSD FRML MDRD: 75 ML/MIN/{1.73_M2}
GLUCOSE SERPL-MCNC: 102 MG/DL (ref 70–99)
HCT VFR BLD AUTO: 38.3 % (ref 40–53)
HDLC SERPL-MCNC: 53 MG/DL
HGB BLD-MCNC: 13.1 G/DL (ref 13.3–17.7)
LDLC SERPL CALC-MCNC: 76 MG/DL
MCH RBC QN AUTO: 31.3 PG (ref 26.5–33)
MCHC RBC AUTO-ENTMCNC: 34.2 G/DL (ref 31.5–36.5)
MCV RBC AUTO: 92 FL (ref 78–100)
NONHDLC SERPL-MCNC: 92 MG/DL
PLATELET # BLD AUTO: 271 10E9/L (ref 150–450)
POTASSIUM SERPL-SCNC: 4.1 MMOL/L (ref 3.4–5.3)
PSA SERPL-ACNC: 2 UG/L (ref 0–4)
RBC # BLD AUTO: 4.18 10E12/L (ref 4.4–5.9)
SODIUM SERPL-SCNC: 141 MMOL/L (ref 133–144)
TRIGL SERPL-MCNC: 81 MG/DL
WBC # BLD AUTO: 5.8 10E9/L (ref 4–11)

## 2019-01-22 PROCEDURE — 36415 COLL VENOUS BLD VENIPUNCTURE: CPT | Performed by: INTERNAL MEDICINE

## 2019-01-22 PROCEDURE — 86803 HEPATITIS C AB TEST: CPT | Performed by: INTERNAL MEDICINE

## 2019-01-22 PROCEDURE — 80061 LIPID PANEL: CPT | Performed by: INTERNAL MEDICINE

## 2019-01-22 PROCEDURE — 80048 BASIC METABOLIC PNL TOTAL CA: CPT | Performed by: INTERNAL MEDICINE

## 2019-01-22 PROCEDURE — 99397 PER PM REEVAL EST PAT 65+ YR: CPT | Performed by: INTERNAL MEDICINE

## 2019-01-22 PROCEDURE — 85027 COMPLETE CBC AUTOMATED: CPT | Performed by: INTERNAL MEDICINE

## 2019-01-22 PROCEDURE — G0103 PSA SCREENING: HCPCS | Performed by: INTERNAL MEDICINE

## 2019-01-22 RX ORDER — MELOXICAM 15 MG/1
15 TABLET ORAL DAILY
Qty: 14 TABLET | Refills: 0 | Status: SHIPPED | OUTPATIENT
Start: 2019-01-22 | End: 2019-01-31

## 2019-01-22 RX ORDER — TRIAMCINOLONE ACETONIDE 1 MG/G
OINTMENT TOPICAL
Qty: 15 G | Refills: 1 | Status: SHIPPED | OUTPATIENT
Start: 2019-01-22 | End: 2022-02-08

## 2019-01-22 RX ORDER — LORATADINE 10 MG/1
10 TABLET ORAL DAILY
COMMUNITY
End: 2019-04-30

## 2019-01-22 ASSESSMENT — ENCOUNTER SYMPTOMS
SHORTNESS OF BREATH: 0
SORE THROAT: 0
CHILLS: 0
EYE PAIN: 0
HEMATOCHEZIA: 0
HEMATURIA: 0
FEVER: 0
PALPITATIONS: 0
NAUSEA: 0
FREQUENCY: 0
DYSURIA: 0
CONSTIPATION: 0
HEADACHES: 0
COUGH: 0
MYALGIAS: 1
NERVOUS/ANXIOUS: 0
PARESTHESIAS: 0
WEAKNESS: 0
ARTHRALGIAS: 1
DIZZINESS: 0
HEARTBURN: 0
ABDOMINAL PAIN: 0
JOINT SWELLING: 1
DIARRHEA: 0

## 2019-01-22 ASSESSMENT — ACTIVITIES OF DAILY LIVING (ADL): CURRENT_FUNCTION: NO ASSISTANCE NEEDED

## 2019-01-22 ASSESSMENT — PAIN SCALES - GENERAL: PAINLEVEL: MILD PAIN (3)

## 2019-01-22 ASSESSMENT — MIFFLIN-ST. JEOR: SCORE: 2144.31

## 2019-01-22 NOTE — PATIENT INSTRUCTIONS
Preventive Health Recommendations:     See your health care provider every year to    Review health changes.     Discuss preventive care.      Review your medicines if your doctor has prescribed any.    Talk with your health care provider about whether you should have a test to screen for prostate cancer (PSA).    Every 3 years, have a diabetes test (fasting glucose). If you are at risk for diabetes, you should have this test more often.    Every 5 years, have a cholesterol test. Have this test more often if you are at risk for high cholesterol or heart disease.     Every 10 years, have a colonoscopy. Or, have a yearly FIT test (stool test). These exams will check for colon cancer.    Talk to with your health care provider about screening for Abdominal Aortic Aneurysm if you have a family history of AAA or have a history of smoking.  Shots:     Get a flu shot each year.     Get a tetanus shot every 10 years.     Talk to your doctor about your pneumonia vaccines. There are now two you should receive - Pneumovax (PPSV 23) and Prevnar (PCV 13).    Talk to your pharmacist about a shingles vaccine.     Talk to your doctor about the hepatitis B vaccine.  Nutrition:     Eat at least 5 servings of fruits and vegetables each day.     Eat whole-grain bread, whole-wheat pasta and brown rice instead of white grains and rice.     Get adequate Calcium and Vitamin D.   Lifestyle    Exercise for at least 150 minutes a week (30 minutes a day, 5 days a week). This will help you control your weight and prevent disease.     Limit alcohol to one drink per day.     No smoking.     Wear sunscreen to prevent skin cancer.     See your dentist every six months for an exam and cleaning.     See your eye doctor every 1 to 2 years to screen for conditions such as glaucoma, macular degeneration and cataracts.    Personalized Prevention Plan  You are due for the preventive services outlined below.  Your care team is available to assist you in  scheduling these services.  If you have already completed any of these items, please share that information with your care team to update in your medical record.    Health Maintenance Due   Topic Date Due     Hepatitis C Screening  06/23/1968     Zoster (Chicken Pox) Vaccine (1 of 2) 06/23/2001     Flu Vaccine (1) 09/01/2018     FALL RISK ASSESSMENT  11/07/2018     Depression Assessment 2 - yearly  11/07/2018

## 2019-01-22 NOTE — PROGRESS NOTES
"SUBJECTIVE:   Jarrod Lewis is a 68 year old male who presents for Preventive Visit.  Are you in the first 12 months of your Medicare coverage?  No    Annual Wellness Visit     In general, how would you rate your overall health?  Good    Frequency of exercise:  None    Do you usually eat at least 4 servings of fruit and vegetables a day, include whole grains    & fiber and avoid regularly eating high fat or \"junk\" foods?  No    Taking medications regularly:  Yes    Medication side effects:  None    Ability to successfully perform activities of daily living:  No assistance needed    Home Safety:  No safety concerns identified    Hearing Impairment:  Difficulty following a conversation in a noisy restaurant or crowded room, need to ask people to speak up or repeat themselves and difficulty understanding soft or whispered speech    In the past 6 months, have you been bothered by leaking of urine? Yes    In general, how would you rate your overall mental or emotional health?  Excellent    PHQ-2 Total Score: 0    Additional concerns today:  No    Do you feel safe in your environment? Yes    Do you have a Health Care Directive? No: Advance care planning reviewed with patient; information given to patient to review.      Fall risk  Fallen 2 or more times in the past year?: No  Any fall with injury in the past year?: No    Cognitive Screening   1) Repeat 3 items (Leader, Season, Table)    2) Clock draw: NORMAL  3) 3 item recall: Recalls 2 objects   Results: NORMAL clock, 1-2 items recalled: COGNITIVE IMPAIRMENT LESS LIKELY    Mini-CogTM Copyright RODERICK Amos. Licensed by the author for use in [facility]; reprinted with permission (madi@.[facility]). All rights reserved.      Do you have sleep apnea, excessive snoring or daytime drowsiness?: no    Reviewed and updated as needed this visit by clinical staff  Tobacco  Allergies  Meds  Med Hx  Surg Hx  Fam Hx  Soc Hx        Reviewed and updated as needed this visit " by Provider        Social History     Tobacco Use     Smoking status: Former Smoker     Types: Pipe     Last attempt to quit: 2012     Years since quittin.0     Smokeless tobacco: Never Used   Substance Use Topics     Alcohol use: Yes     Alcohol/week: 0.0 oz     Comment: occasional       Alcohol Use 2019   If you drink alcohol do you typically have greater than 3 drinks per day OR greater than 7 drinks per week? No               Current providers sharing in care for this patient include:   Patient Care Team:  Adria Cowart DO as PCP - General (Internal Medicine)  Adria Cowart DO as PCP - Assigned PCP    The following health maintenance items are reviewed in Epic and correct as of today:  Health Maintenance   Topic Date Due     HEPATITIS C SCREENING  1969     ZOSTER IMMUNIZATION (1 of 2) 2001     INFLUENZA VACCINE (1) 2018     FALL RISK ASSESSMENT  2018     PHQ-2 Q1 YR  2018     ADVANCE DIRECTIVE PLANNING Q5 YRS  2021     LIPID SCREEN Q5 YR MALE (SYSTEM ASSIGNED)  2022     DTAP/TDAP/TD IMMUNIZATION (3 - Td) 2024     COLON CANCER SCREEN (SYSTEM ASSIGNED)  10/26/2026     AORTIC ANEURYSM SCREENING (SYSTEM ASSIGNED)  Completed     IPV IMMUNIZATION  Aged Out     MENINGITIS IMMUNIZATION  Aged Out     Labs reviewed in EPIC  BP Readings from Last 3 Encounters:   19 138/84   19 134/80   19 126/86    Wt Readings from Last 3 Encounters:   19 132 kg (291 lb)   19 131.5 kg (290 lb)   19 130.6 kg (288 lb)                  Patient Active Problem List   Diagnosis     CARDIOVASCULAR SCREENING; LDL GOAL LESS THAN 130     Reducible right inguinal hernia     GI bleed     Advanced directives, counseling/discussion     Allergic rhinitis due to dust mite     Sinus pressure     Seasonal allergic rhinitis due to pollen     Allergic rhinitis due to mold     Penicillin allergy     Past Surgical History:   Procedure  Laterality Date     C TOTAL KNEE ARTHROPLASTY Bilateral      COLONOSCOPY  2012    Procedure: COLONOSCOPY;  Colonoscopy;  Surgeon: Sylvester Lucas MD;  Location: [facility]     COLONOSCOPY N/A 10/26/2016    Procedure: COMBINED COLONOSCOPY, SINGLE OR MULTIPLE BIOPSY/POLYPECTOMY BY BIOPSY;  Surgeon: Trev Oquendo MD;  Location: [facility]     ESOPHAGOSCOPY, GASTROSCOPY, DUODENOSCOPY (EGD), COMBINED N/A 2017    Procedure: COMBINED ESOPHAGOSCOPY, GASTROSCOPY, DUODENOSCOPY (EGD), BIOPSY SINGLE OR MULTIPLE;  ESOPHAGOSCOPY, GASTROSCOPY, DUODENOSCOPY (EGD) with biopsies by forceps;  Surgeon: Puma Dominguez MD;  Location: [facility]     ESOPHAGOSCOPY, GASTROSCOPY, DUODENOSCOPY (EGD), COMBINED N/A 2017    Procedure: COMBINED ESOPHAGOSCOPY, GASTROSCOPY, DUODENOSCOPY (EGD);  ESOPHAGOSCOPY, GASTROSCOPY, DUODENOSCOPY (EGD);  Surgeon: Sylvester Lucas MD;  Location: [facility]     HERNIORRHAPHY INGUINAL Right 3/8/2017    Procedure: HERNIORRHAPHY INGUINAL;  Surgeon: Kong Lassiter MD;  Location: [facility]     JOINT REPLACEMENT, HIP RT/LT Right 2012     MOHS MICROGRAPHIC PROCEDURE Left 2017    Left cheek       Social History     Tobacco Use     Smoking status: Former Smoker     Types: Pipe     Last attempt to quit: 2012     Years since quittin.1     Smokeless tobacco: Never Used   Substance Use Topics     Alcohol use: Yes     Alcohol/week: 0.0 oz     Comment: occasional     Family History   Problem Relation Age of Onset     Stomach Cancer Father          at age 46         Current Outpatient Medications   Medication Sig Dispense Refill     acetaminophen (TYLENOL) 325 MG tablet Take 650 mg by mouth every 4 hours as needed for mild pain or fever Reported on 3/6/2017 100 tablet      loratadine (CLARITIN) 10 MG tablet Take 10 mg by mouth daily       omeprazole (PRILOSEC) 40 MG capsule Take 1 capsule (40 mg) by mouth daily Take 30-60 minutes before a meal. (Patient not taking: Reported on 2019) 30 capsule 1      "triamcinolone (KENALOG) 0.1 % external ointment Apply sparingly to affected area three times daily as needed. 15 g 1     triamcinolone (NASACORT) 55 MCG/ACT nasal aerosol Spray 2 sprays into both nostrils daily 1 Bottle 3     meloxicam (MOBIC) 15 MG tablet Take 1 tablet (15 mg) by mouth daily 14 tablet 0     Allergies   Allergen Reactions     Penicillins Unknown     As a child       Zithromax [Azithromycin Dihydrate]      diarrhea         Review of Systems  CONSTITUTIONAL: NEGATIVE for fever, chills, change in weight  INTEGUMENTARY/SKIN: Patient does have some mild atopic dermatitis which he treats occasionally with topical steroid cream.  It is not currently present.  EYES: NEGATIVE for vision changes or irritation  ENT/MOUTH: NEGATIVE for ear, mouth and throat problems  RESP: NEGATIVE for significant cough or SOB  BREAST: NEGATIVE for masses, tenderness or discharge  CV: NEGATIVE for chest pain, palpitations or peripheral edema  GI: NEGATIVE for nausea, abdominal pain, heartburn, or change in bowel habits  : NEGATIVE for frequency, dysuria, or hematuria  MUSCULOSKELETAL: Patient does have some discomfort in the right hip.  Is worse with laying on the hip.  Actually gets a little bit better when he walks.  NEURO: NEGATIVE for weakness, dizziness or paresthesias  ENDOCRINE: NEGATIVE for temperature intolerance, skin/hair changes  HEME: NEGATIVE for bleeding problems  PSYCHIATRIC: NEGATIVE for changes in mood or affect    OBJECTIVE:   /80 (BP Location: Left arm, Patient Position: Sitting, Cuff Size: Adult Large)   Pulse 88   Temp 97.7  F (36.5  C) (Temporal)   Resp 20   Ht 1.854 m (6' 1\")   Wt 131.5 kg (290 lb)   SpO2 97%   BMI 38.26 kg/m   Estimated body mass index is 38.26 kg/m  as calculated from the following:    Height as of this encounter: 1.854 m (6' 1\").    Weight as of this encounter: 131.5 kg (290 lb).  Physical Exam  GENERAL: healthy, alert and no distress  EYES: Eyes grossly normal to " inspection, PERRL and conjunctivae and sclerae normal  HENT: ear canals and TM's normal, nose and mouth without ulcers or lesions  NECK: no adenopathy, no asymmetry, masses, or scars and thyroid normal to palpation  RESP: lungs clear to auscultation - no rales, rhonchi or wheezes  CV: regular rate and rhythm, normal S1 S2, no S3 or S4, no murmur, click or rub, no peripheral edema and peripheral pulses strong  ABDOMEN: soft, nontender, no hepatosplenomegaly, no masses and bowel sounds normal  MS: A little bit of percussion discomfort over the right hip is noted.  Gait is unchanged.  No crepitance of significance is noted.  Range of motion of the peripheral joints is intact.  No acute inflammation noted.  SKIN: no suspicious lesions or rashes  NEURO: Normal strength and tone, mentation intact and speech normal  PSYCH: mentation appears normal, affect normal/bright    Diagnostic Test Results:  Results for orders placed or performed in visit on 01/22/19   PSA, screen   Result Value Ref Range    PSA 2.00 0 - 4 ug/L   Basic metabolic panel   Result Value Ref Range    Sodium 141 133 - 144 mmol/L    Potassium 4.1 3.4 - 5.3 mmol/L    Chloride 108 94 - 109 mmol/L    Carbon Dioxide 25 20 - 32 mmol/L    Anion Gap 8 3 - 14 mmol/L    Glucose 102 (H) 70 - 99 mg/dL    Urea Nitrogen 27 7 - 30 mg/dL    Creatinine 1.02 0.66 - 1.25 mg/dL    GFR Estimate 75 >60 mL/min/[1.73_m2]    GFR Estimate If Black 87 >60 mL/min/[1.73_m2]    Calcium 8.5 8.5 - 10.1 mg/dL   CBC with platelets   Result Value Ref Range    WBC 5.8 4.0 - 11.0 10e9/L    RBC Count 4.18 (L) 4.4 - 5.9 10e12/L    Hemoglobin 13.1 (L) 13.3 - 17.7 g/dL    Hematocrit 38.3 (L) 40.0 - 53.0 %    MCV 92 78 - 100 fl    MCH 31.3 26.5 - 33.0 pg    MCHC 34.2 31.5 - 36.5 g/dL    RDW 12.8 10.0 - 15.0 %    Platelet Count 271 150 - 450 10e9/L   Hepatitis C Screen Reflex to HCV RNA Quant and Genotype   Result Value Ref Range    Hepatitis C Antibody Nonreactive NR^Nonreactive   Lipid Profile  "  Result Value Ref Range    Cholesterol 145 <200 mg/dL    Triglycerides 81 <150 mg/dL    HDL Cholesterol 53 >39 mg/dL    LDL Cholesterol Calculated 76 <100 mg/dL    Non HDL Cholesterol 92 <130 mg/dL       ASSESSMENT / PLAN:       ICD-10-CM    1. Encounter for routine adult health examination without abnormal findings Z00.00 Basic metabolic panel     CBC with platelets   2. Other atopic dermatitis L20.89 triamcinolone (KENALOG) 0.1 % external ointment   3. Bursitis of right hip, unspecified bursa M70.71 DISCONTINUED: meloxicam (MOBIC) 15 MG tablet   4. Screening for prostate cancer Z12.5 PSA, screen   5. Need for hepatitis C screening test Z11.59 Hepatitis C Screen Reflex to HCV RNA Quant and Genotype   6. Hyperlipidemia LDL goal <130 E78.5 Lipid Profile       End of Life Planning:  Patient currently has an advanced directive: Yes.  Practitioner is supportive of decision.    COUNSELING:  Reviewed preventive health counseling, as reflected in patient instructions       Consider AAA screening for ages 65-75 and smoking history       Regular exercise       Healthy diet/nutrition       Vision screening       Hearing screening       Dental care       Colon cancer screening       Prostate cancer screening    BP Readings from Last 1 Encounters:   01/22/19 134/80     Estimated body mass index is 38.26 kg/m  as calculated from the following:    Height as of this encounter: 1.854 m (6' 1\").    Weight as of this encounter: 131.5 kg (290 lb).    BP Screening:   Last 3 BP Readings:    BP Readings from Last 3 Encounters:   02/08/19 138/84   01/22/19 134/80   01/09/19 126/86       The following was recommended to the patient:  Re-screen within 4 weeks and recommend lifestyle modifications  Weight management plan: Discussed healthy diet and exercise guidelines     reports that he quit smoking about 7 years ago. His smoking use included pipe. he has never used smokeless tobacco.      Appropriate preventive services were discussed " with this patient, including applicable screening as appropriate for cardiovascular disease, diabetes, osteopenia/osteoporosis, and glaucoma.  As appropriate for age/gender, discussed screening for colorectal cancer, prostate cancer, breast cancer, and cervical cancer. Checklist reviewing preventive services available has been given to the patient.    Reviewed patients plan of care and provided an AVS. The Basic Care Plan (routine screening as documented in Health Maintenance) for Jarrod meets the Care Plan requirement. This Care Plan has been established and reviewed with the Patient.    Counseling Resources:  ATP IV Guidelines  Pooled Cohorts Equation Calculator  Breast Cancer Risk Calculator  FRAX Risk Assessment  ICSI Preventive Guidelines  Dietary Guidelines for Americans, 2010  USDA's MyPlate  ASA Prophylaxis  Lung CA Screening    Adria Cowart DO  [facility]

## 2019-01-22 NOTE — PROGRESS NOTES
"  SUBJECTIVE:   Jarrod Lewis is a 68 year old male who presents for Preventive Visit.  {PVP to remind patient that this is not necessarily a physical exam; physical exam may or may not be done:349410::\"click delete button to remove this line now\"}  {PVP to inform patient that additional E&M charge may apply, if additional problems addressed:136515::\"click delete button to remove this line now\"}  Are you in the first 12 months of your Medicare Part B coverage?  { :790805::\"No\"}    Physical Health:    In general, how would you rate your overall physical health? { :763128}    Outside of work, how many days during the week do you exercise? { :192720}    Outside of work, approximately how many minutes a day do you exercise?{ :339996}    If you drink alcohol do you typically have >3 drinks per day or >7 drinks per week? { :085781}    Do you usually eat at least 4 servings of fruit and vegetables a day, include whole grains & fiber and avoid regularly eating high fat or \"junk\" foods? { :226119::\"Yes\"}    Do you have any problems taking medications regularly?  { :534192::\"No\"}    Do you have any side effects from medications? { :486153}    Needs assistance for the following daily activities: { :177254}    Which of the following safety concerns are present in your home?  { :968447::\"none identified\"}     Hearing impairment: { :289755}    In the past 6 months, have you been bothered by leaking of urine? { :940014}    Mental Health:    In general, how would you rate your overall mental or emotional health? { :960796}  PHQ-2 Score: (P) 0    Do you feel safe in your environment? { :424163}    Do you have a Health Care Directive? { :135593}    Additional concerns to address?  {If YES, notify patient they may be responsible for a copay, coinsurance or deductible if the visit today includes services such as checking on a sore throat, having an x-ray or lab test, or treating and evaluating a new or existing condition " ":674501::\"No\"}    Fall risk:  { :639580}  {If any of the above assessments are answered yes, consider ordering appropriate referrals (Optional):400984::\"click delete button to remove this line now\"}  Cognitive Screening: { :179930}    {Do you have sleep apnea, excessive snoring or daytime drowsiness? (Optional):592055}    {Outside tests to abstract? :469151}    {additional problems to add (Optional):999341}    Reviewed and updated as needed this visit by clinical staff         Reviewed and updated as needed this visit by Provider        Social History     Tobacco Use     Smoking status: Former Smoker     Types: Pipe     Last attempt to quit:      Years since quittin.0     Smokeless tobacco: Never Used   Substance Use Topics     Alcohol use: Yes     Alcohol/week: 0.0 oz     Comment: occasional                           Current providers sharing in care for this patient include:   Patient Care Team:  Adria Cowart DO as PCP - General (Internal Medicine)  Adria Cowart DO as PCP - Assigned PCP    The following health maintenance items are reviewed in Epic and correct as of today:  Health Maintenance   Topic Date Due     HEPATITIS C SCREENING  1969     ZOSTER IMMUNIZATION (1 of 2) 2001     INFLUENZA VACCINE (1) 2018     FALL RISK ASSESSMENT  2018     PHQ-2 Q1 YR  2018     ADVANCE DIRECTIVE PLANNING Q5 YRS  2021     LIPID SCREEN Q5 YR MALE (SYSTEM ASSIGNED)  2022     DTAP/TDAP/TD IMMUNIZATION (3 - Td) 2024     COLON CANCER SCREEN (SYSTEM ASSIGNED)  10/26/2026     AORTIC ANEURYSM SCREENING (SYSTEM ASSIGNED)  Completed     IPV IMMUNIZATION  Aged Out     MENINGITIS IMMUNIZATION  Aged Out     {Chronicprobdata (Optional):204536}  {Decision Support (Optional):568094}    ROS:  {ROS COMP:416306}    OBJECTIVE:   There were no vitals taken for this visit. Estimated body mass index is 36.98 kg/m  as calculated from the following:    Height as of " "19: 1.88 m (6' 2\").    Weight as of 19: 130.6 kg (288 lb).  EXAM:   {Exam :784249}    {Diagnostic Test Results (Optional):837303::\"Diagnostic Test Results:\",\"none \"}    ASSESSMENT / PLAN:   {Diag Picklist:047025}    End of Life Planning:  Patient currently has an advanced directive: { :469135}    COUNSELING:  {Medicare Counselin}    BP Readings from Last 1 Encounters:   19 126/86     Estimated body mass index is 36.98 kg/m  as calculated from the following:    Height as of 19: 1.88 m (6' 2\").    Weight as of 19: 130.6 kg (288 lb).    {BP Counseling- Complete if BP >= 120/80  (Optional):457361}  {Weight Management Plan (ACO) Complete if BMI is abnormal-  Ages 18-64  BMI >24.9.  Age 65+ with BMI <23 or >30 (Optional):105523}     reports that he quit smoking about 7 years ago. His smoking use included pipe. he has never used smokeless tobacco.  {Tobacco Cessation -- Complete if patient is a smoker (Optional):434363}    Appropriate preventive services were discussed with this patient, including applicable screening as appropriate for cardiovascular disease, diabetes, osteopenia/osteoporosis, and glaucoma.  As appropriate for age/gender, discussed screening for colorectal cancer, prostate cancer, breast cancer, and cervical cancer. Checklist reviewing preventive services available has been given to the patient.    Reviewed patients plan of care and provided an AVS. The {CarePlan:122349} for Jarrod meets the Care Plan requirement. This Care Plan has been established and reviewed with the {PATIENT, FAMILY MEMBER, CAREGIVER:519785}.    Counseling Resources:  ATP IV Guidelines  Pooled Cohorts Equation Calculator  Breast Cancer Risk Calculator  FRAX Risk Assessment  ICSI Preventive Guidelines  Dietary Guidelines for Americans,   USDA's MyPlate  ASA Prophylaxis  Lung CA Screening    Adria Cowart, DO  [facility]"

## 2019-01-23 LAB — HCV AB SERPL QL IA: NONREACTIVE

## 2019-01-29 NOTE — RESULT ENCOUNTER NOTE
Dear Jarrod, your recent test results are attached.    The hepatitis C screening test is negative.  The prostate antigen is normal at 2.0.  The chemistry panel is normal including kidney and blood sugar.  The cholesterol is well controlled with an LDL of 76.  The blood cell count shows a mild anemia with hemoglobin of 13.1 which is actually improved.    Feel free to contact me via the office or My Chart if you have any questions regarding the above.  Sincerely,  Adria Cowart DO [facility]

## 2019-01-31 ENCOUNTER — MYC REFILL (OUTPATIENT)
Dept: FAMILY MEDICINE | Facility: OTHER | Age: 69
End: 2019-01-31

## 2019-01-31 DIAGNOSIS — M70.71 BURSITIS OF RIGHT HIP, UNSPECIFIED BURSA: ICD-10-CM

## 2019-01-31 RX ORDER — MELOXICAM 15 MG/1
15 TABLET ORAL DAILY
Qty: 14 TABLET | Refills: 0 | Status: SHIPPED | OUTPATIENT
Start: 2019-01-31 | End: 2019-04-30

## 2019-02-08 ENCOUNTER — OFFICE VISIT (OUTPATIENT)
Dept: FAMILY MEDICINE | Facility: OTHER | Age: 69
End: 2019-02-08
Payer: COMMERCIAL

## 2019-02-08 VITALS
HEART RATE: 82 BPM | SYSTOLIC BLOOD PRESSURE: 138 MMHG | OXYGEN SATURATION: 96 % | BODY MASS INDEX: 38.39 KG/M2 | TEMPERATURE: 98.2 F | DIASTOLIC BLOOD PRESSURE: 84 MMHG | RESPIRATION RATE: 14 BRPM | WEIGHT: 291 LBS

## 2019-02-08 DIAGNOSIS — S29.8XXA BLUNT TRAUMA OF RIB, INITIAL ENCOUNTER: Primary | ICD-10-CM

## 2019-02-08 PROCEDURE — 99213 OFFICE O/P EST LOW 20 MIN: CPT | Performed by: INTERNAL MEDICINE

## 2019-02-08 ASSESSMENT — PAIN SCALES - GENERAL: PAINLEVEL: MODERATE PAIN (4)

## 2019-02-08 NOTE — PROGRESS NOTES
SUBJECTIVE:   Jarrod Lewis is a 68 year old male who presents to clinic today for the following health issues:      Chief Complaint   Patient presents with     Fall     fell yesterday, tripped while working in his shop     Rib Pain                           Chief Complaint         The patient is a pleasant 68-year-old gentleman who was working in his shop when he tripped and fell.  He struck his right mid chest possibly on his own hand or on the metal tube that he was holding.  He now has discomfort in the ribs on that side.  He denies any shortness of breath or hemoptysis.  Both the pain is easily reproducible with palpation of the lateral sixth and seventh ribs at the mid axillary line.  No crepitance is noted.  No subcutaneous air present.  Lungs are clear in this area without signs of consolidation.  No other trauma or injury is noted.  He had no loss of consciousness.                       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     Surgical History:   has a past surgical history that includes Colonoscopy (12/14/2012); Colonoscopy (N/A, 10/26/2016); Mohs micrographic procedure (Left, 01/02/2017); joint replacement, hip rt/lt (Right, 02/03/2012); TOTAL KNEE ARTHROPLASTY (Bilateral); Herniorrhaphy inguinal (Right, 3/8/2017); Esophagoscopy, gastroscopy, duodenoscopy (EGD), combined (N/A, 4/24/2017); and Esophagoscopy, gastroscopy, duodenoscopy (EGD), combined (N/A, 6/12/2017).     Social History:   reports that he quit smoking about 7 years ago. His smoking use included pipe. he has never used smokeless tobacco. He reports that he drinks alcohol. He reports that he does not use drugs.     Family History:  family history includes Stomach Cancer in his father.            MEDICATIONS  Current Outpatient Medications   Medication Sig Dispense Refill     acetaminophen (TYLENOL)  325 MG tablet Take 650 mg by mouth every 4 hours as needed for mild pain or fever Reported on 3/6/2017 100 tablet      loratadine (CLARITIN) 10 MG tablet Take 10 mg by mouth daily       meloxicam (MOBIC) 15 MG tablet Take 1 tablet (15 mg) by mouth daily 14 tablet 0     triamcinolone (KENALOG) 0.1 % external ointment Apply sparingly to affected area three times daily as needed. 15 g 1     triamcinolone (NASACORT) 55 MCG/ACT nasal aerosol Spray 2 sprays into both nostrils daily 1 Bottle 3     omeprazole (PRILOSEC) 40 MG capsule Take 1 capsule (40 mg) by mouth daily Take 30-60 minutes before a meal. (Patient not taking: Reported on 2/8/2019) 30 capsule 1         --------------------------------------------------------------------------------------------------------------------                              Review of Systems     LUNGS: Pt denies: cough, excess sputum, hemoptysis, or shortness of breath.   HEART: Pt denies: chest pain, arrhythmia, syncope, tachy or bradyarrhythmia.   GI: Pt denies: nausea, vomiting, diarrhea, constipation, melena, or hematochezia.   NEURO: Pt denies: seizures, strokes, diplopia, weakness, paraesthesias, or paralysis.                                     Examination      /84 (BP Location: Left arm, Patient Position: Sitting, Cuff Size: Adult Large)   Pulse 82   Temp 98.2  F (36.8  C) (Temporal)   Resp 14   Wt 132 kg (291 lb)   SpO2 96%   BMI 38.39 kg/m     Constitutional: The patient appears to be in no acute distress. The patient appears to be adequately hydrated. No acute respiratory or hemodynamic distress is noted at this time.   LUNGS: clear bilaterally, airflow is brisk, no intercostal retraction or stridor is noted. No coughing is noted during visit.   HEART:  regular without rubs, clicks, gallops, or murmurs. PMI is nondisplaced. Upstrokes are brisk. S1,S2 are heard.   MS: Minimal crepitance is noted in the thorax.. No deformity is present. Muscle strength is appropriate  and equal bilaterally. No acute joint erythema or swelling is present.  No subcutaneous air is noted.                                           Decision Making    1. Blunt trauma of rib, initial encounter  Given his history of gastrointestinal bleeding, would recommend Tylenol, moist heat as needed.  Patient chooses not to do x-ray as he is aware that this would lead to no therapeutic change.                           FOLLOW UP   I have asked the patient to make an appointment for followup with me as needed or in 2 weeks.        I have carefully explained the diagnosis and treatment options to the patient.  The patient has displayed an understanding of the above, and all subsequent questions were answered.      DO TIMMY Major    Portions of this note were produced using [employer]  Although every attempt at real-time proof reading has been made, occasional grammar/syntax errors may have been missed.

## 2019-04-15 ENCOUNTER — OFFICE VISIT (OUTPATIENT)
Dept: FAMILY MEDICINE | Facility: OTHER | Age: 69
End: 2019-04-15
Payer: COMMERCIAL

## 2019-04-15 ENCOUNTER — TELEPHONE (OUTPATIENT)
Dept: FAMILY MEDICINE | Facility: OTHER | Age: 69
End: 2019-04-15

## 2019-04-15 VITALS
DIASTOLIC BLOOD PRESSURE: 82 MMHG | RESPIRATION RATE: 16 BRPM | BODY MASS INDEX: 37.57 KG/M2 | HEART RATE: 86 BPM | TEMPERATURE: 99.1 F | SYSTOLIC BLOOD PRESSURE: 140 MMHG | OXYGEN SATURATION: 95 % | WEIGHT: 284.8 LBS

## 2019-04-15 DIAGNOSIS — J01.00 ACUTE NON-RECURRENT MAXILLARY SINUSITIS: Primary | ICD-10-CM

## 2019-04-15 DIAGNOSIS — J34.89 SINUS PRESSURE: ICD-10-CM

## 2019-04-15 PROCEDURE — 99213 OFFICE O/P EST LOW 20 MIN: CPT | Performed by: INTERNAL MEDICINE

## 2019-04-15 RX ORDER — DOXYCYCLINE 100 MG/1
100 CAPSULE ORAL 2 TIMES DAILY
Qty: 14 CAPSULE | Refills: 0 | Status: SHIPPED | OUTPATIENT
Start: 2019-04-15 | End: 2019-04-30

## 2019-04-15 ASSESSMENT — PAIN SCALES - GENERAL: PAINLEVEL: NO PAIN (0)

## 2019-04-15 NOTE — PROGRESS NOTES
SUBJECTIVE:   Jarrod Lewis is a 68 year old male who presents to clinic today for the following   health issues:        Acute Illness   Acute illness concerns: URI  Onset: 2 weeks    Fever: no    Chills/Sweats: no    Headache (location?): YES    Sinus Pressure:YES    Conjunctivitis:  no    Ear Pain: no    Rhinorrhea: YES    Congestion: YES    Sore Throat: YES     Cough: YES-productive of green sputum    Wheeze: YES    Decreased Appetite: YES    Nausea: no    Vomiting: no    Diarrhea:  no    Dysuria/Freq.: no    Fatigue/Achiness: YES    Sick/Strep Exposure: no     Therapies Tried and outcome: nyquil, sudafed- no relief                        Chief Complaint         The patient is a pleasant 68-year-old gentleman who recently [place]  He presents now with an upper respiratory tract infection.  He notes nasal congestion, posterior nasal drainage, frontal and maxillary head pressure, slightly sore throat and a little bit of vertigo.  Over-the-counter medications have not been helpful.  He is taking food and fluid adequately at this time.                              Review of Systems       LUNGS: Pt denies: cough, excess sputum, hemoptysis, or shortness of breath.   HEART: Pt denies: chest pain, arrhythmia, syncope, tachy or bradyarrhythmia.   GI: Pt denies: nausea, vomiting, diarrhea, constipation, melena, or hematochezia.   NEURO: Pt denies: seizures, strokes, diplopia, weakness, paraesthesias, or paralysis.   SKIN: Pt denies: itching, rashes, discoloration, or specific lesions of concern. Denies recent hair loss.   PSYCH: The patient denies significant depression, anxiety, mood imbalance. Specifically denies any suicidal ideation.                                     Examination      /82 (BP Location: Left arm, Patient Position: Sitting, Cuff Size: Adult Regular)   Pulse 86   Temp 99.1  F (37.3  C) (Temporal)   Resp 16   Wt 129.2 kg (284 lb 12.8 oz)    SpO2 95%   BMI 37.57 kg/m     Constitutional: The patient appears to be in no acute distress. The patient appears to be adequately hydrated. No acute respiratory or hemodynamic distress is noted at this time.   ENT: Pharynx is mildly-erythemous, moderate/yellow PND, there is significant nasal obstruction, TM's are slightly red and retracted, hearing intact bilaterally. No carotid bruits are heard. No JVD seen. Thyroid is not nodular or enlarged.   LUNGS: clear bilaterally, airflow is brisk, no intercostal retraction or stridor is noted. No coughing is noted during visit.   HEART:  regular without rubs, clicks, gallops, or murmurs. PMI is nondisplaced. Upstrokes are brisk. S1,S2 are heard.   GI: Abdomen is soft, without rebound, guarding or tenderness. Bowel sounds are appropriate. No renal bruits are heard.   NEURO: Pt is alert and appropriate. No neurologic lateralization is noted. Cranial nerves 2-12 are intact. Peripheral sensory and motor function are grossly normal.    SKIN:  warm and dry. No erythema, or rashes are noted. No specific lesions of concern are noted.    PSYCH: The patient appears grossly appropriate. Maintains good eye contact, does not have any jittery or atypical motion. Displays appropriate affect.                                           Decision Making    1. Acute non-recurrent maxillary sinusitis  Claritin, fluids, rest, antibiotic.  - doxycycline hyclate (VIBRAMYCIN) 100 MG capsule; Take 1 capsule (100 mg) by mouth 2 times daily  Dispense: 14 capsule; Refill: 0    2. Sinus pressure  As above                             FOLLOW UP   I have asked the patient to make an appointment for followup with me as needed or in 6 months.        I have carefully explained the diagnosis and treatment options to the patient.  The patient has displayed an understanding of the above, and all subsequent questions were answered.      DO TIMMY Major    Portions of this note were produced using  [employer]  Although every attempt at real-time proof reading has been made, occasional grammar/syntax errors may have been missed.

## 2019-04-15 NOTE — TELEPHONE ENCOUNTER
Reason for Call:  Same Day Appointment, Requested Provider:  Adria Cowart DO    PCP: Adria Cowart    Reason for visit: cough and cold symptoms     Duration of symptoms: two weeks and cough is worse     Have you been treated for this in the past?     Additional comments: Al is wondering if he could see you today. He states he just cant shake this cold and his cough is worse.     Can we leave a detailed message on this number? YES    Phone number patient can be reached at: Cell number on file:    Telephone Information:   Mobile [phone]       Best Time:      Call taken on 4/15/2019 at 8:11 AM by So Mendoza

## 2019-04-30 ENCOUNTER — HOSPITAL ENCOUNTER (INPATIENT)
Facility: CLINIC | Age: 69
LOS: 2 days | Discharge: HOME | DRG: 378 | End: 2019-05-02
Attending: HOSPITALIST | Admitting: HOSPITALIST
Payer: MEDICARE

## 2019-04-30 ENCOUNTER — HOSPITAL ENCOUNTER (EMERGENCY)
Facility: CLINIC | Age: 69
Discharge: ANOTHER HOSPITAL | End: 2019-04-30
Attending: PHYSICIAN ASSISTANT | Admitting: PHYSICIAN ASSISTANT
Payer: MEDICARE

## 2019-04-30 ENCOUNTER — TELEPHONE (OUTPATIENT)
Dept: FAMILY MEDICINE | Facility: OTHER | Age: 69
End: 2019-04-30

## 2019-04-30 VITALS
WEIGHT: 284.3 LBS | TEMPERATURE: 98 F | SYSTOLIC BLOOD PRESSURE: 128 MMHG | RESPIRATION RATE: 16 BRPM | HEART RATE: 90 BPM | DIASTOLIC BLOOD PRESSURE: 74 MMHG | BODY MASS INDEX: 37.51 KG/M2 | OXYGEN SATURATION: 98 %

## 2019-04-30 DIAGNOSIS — K92.2 GI BLEED: ICD-10-CM

## 2019-04-30 DIAGNOSIS — R42 LIGHTHEADEDNESS: ICD-10-CM

## 2019-04-30 DIAGNOSIS — K92.2 UPPER GI BLEED: ICD-10-CM

## 2019-04-30 DIAGNOSIS — D62 ANEMIA DUE TO BLOOD LOSS, ACUTE: Primary | ICD-10-CM

## 2019-04-30 LAB
ABO + RH BLD: NORMAL
ABO + RH BLD: NORMAL
ALBUMIN SERPL-MCNC: 3.1 G/DL (ref 3.4–5)
ALP SERPL-CCNC: 76 U/L (ref 40–150)
ALT SERPL W P-5'-P-CCNC: 21 U/L (ref 0–70)
ANION GAP SERPL CALCULATED.3IONS-SCNC: 7 MMOL/L (ref 3–14)
APTT PPP: 26 SEC (ref 22–37)
AST SERPL W P-5'-P-CCNC: 11 U/L (ref 0–45)
BASOPHILS # BLD AUTO: 0 10E9/L (ref 0–0.2)
BASOPHILS NFR BLD AUTO: 0.3 %
BILIRUB SERPL-MCNC: 0.4 MG/DL (ref 0.2–1.3)
BLD GP AB SCN SERPL QL: NORMAL
BLOOD BANK CMNT PATIENT-IMP: NORMAL
BUN SERPL-MCNC: 68 MG/DL (ref 7–30)
CALCIUM SERPL-MCNC: 8.5 MG/DL (ref 8.5–10.1)
CHLORIDE SERPL-SCNC: 110 MMOL/L (ref 94–109)
CO2 SERPL-SCNC: 22 MMOL/L (ref 20–32)
CREAT SERPL-MCNC: 1.09 MG/DL (ref 0.66–1.25)
DIFFERENTIAL METHOD BLD: ABNORMAL
EOSINOPHIL NFR BLD AUTO: 0.5 %
ERYTHROCYTE [DISTWIDTH] IN BLOOD BY AUTOMATED COUNT: 12.5 % (ref 10–15)
GFR SERPL CREATININE-BSD FRML MDRD: 69 ML/MIN/{1.73_M2}
GLUCOSE SERPL-MCNC: 120 MG/DL (ref 70–99)
HCT VFR BLD AUTO: 29.1 % (ref 40–53)
HEMOCCULT STL QL: POSITIVE
HGB BLD-MCNC: 9.1 G/DL (ref 13.3–17.7)
HGB BLD-MCNC: 9.8 G/DL (ref 13.3–17.7)
IMM GRANULOCYTES # BLD: 0 10E9/L (ref 0–0.4)
IMM GRANULOCYTES NFR BLD: 0.4 %
INR PPP: 1.06 (ref 0.86–1.14)
LYMPHOCYTES # BLD AUTO: 0.8 10E9/L (ref 0.8–5.3)
LYMPHOCYTES NFR BLD AUTO: 10.7 %
MCH RBC QN AUTO: 30.9 PG (ref 26.5–33)
MCHC RBC AUTO-ENTMCNC: 33.7 G/DL (ref 31.5–36.5)
MCV RBC AUTO: 92 FL (ref 78–100)
MONOCYTES # BLD AUTO: 0.5 10E9/L (ref 0–1.3)
MONOCYTES NFR BLD AUTO: 6.4 %
NEUTROPHILS # BLD AUTO: 6.1 10E9/L (ref 1.6–8.3)
NEUTROPHILS NFR BLD AUTO: 81.7 %
NRBC # BLD AUTO: 0 10*3/UL
NRBC BLD AUTO-RTO: 0 /100
PLATELET # BLD AUTO: 245 10E9/L (ref 150–450)
POTASSIUM SERPL-SCNC: 4 MMOL/L (ref 3.4–5.3)
PROT SERPL-MCNC: 6.3 G/DL (ref 6.8–8.8)
RBC # BLD AUTO: 3.17 10E12/L (ref 4.4–5.9)
SODIUM SERPL-SCNC: 139 MMOL/L (ref 133–144)
SPECIMEN EXP DATE BLD: NORMAL
WBC # BLD AUTO: 7.5 10E9/L (ref 4–11)

## 2019-04-30 PROCEDURE — 99223 1ST HOSP IP/OBS HIGH 75: CPT | Mod: AI | Performed by: HOSPITALIST

## 2019-04-30 PROCEDURE — C9113 INJ PANTOPRAZOLE SODIUM, VIA: HCPCS | Performed by: PHYSICIAN ASSISTANT

## 2019-04-30 PROCEDURE — 99285 EMERGENCY DEPT VISIT HI MDM: CPT | Mod: Z6 | Performed by: FAMILY MEDICINE

## 2019-04-30 PROCEDURE — 96361 HYDRATE IV INFUSION ADD-ON: CPT | Performed by: FAMILY MEDICINE

## 2019-04-30 PROCEDURE — 85730 THROMBOPLASTIN TIME PARTIAL: CPT | Performed by: PHYSICIAN ASSISTANT

## 2019-04-30 PROCEDURE — 85018 HEMOGLOBIN: CPT | Performed by: PHYSICIAN ASSISTANT

## 2019-04-30 PROCEDURE — 85610 PROTHROMBIN TIME: CPT | Performed by: PHYSICIAN ASSISTANT

## 2019-04-30 PROCEDURE — 86900 BLOOD TYPING SEROLOGIC ABO: CPT | Performed by: PHYSICIAN ASSISTANT

## 2019-04-30 PROCEDURE — 86850 RBC ANTIBODY SCREEN: CPT | Performed by: PHYSICIAN ASSISTANT

## 2019-04-30 PROCEDURE — 99285 EMERGENCY DEPT VISIT HI MDM: CPT | Mod: 25 | Performed by: FAMILY MEDICINE

## 2019-04-30 PROCEDURE — 82272 OCCULT BLD FECES 1-3 TESTS: CPT | Performed by: PHYSICIAN ASSISTANT

## 2019-04-30 PROCEDURE — 85025 COMPLETE CBC W/AUTO DIFF WBC: CPT | Performed by: PHYSICIAN ASSISTANT

## 2019-04-30 PROCEDURE — 12000000 ZZH R&B MED SURG/OB

## 2019-04-30 PROCEDURE — 96374 THER/PROPH/DIAG INJ IV PUSH: CPT | Performed by: FAMILY MEDICINE

## 2019-04-30 PROCEDURE — 25000128 H RX IP 250 OP 636: Performed by: PHYSICIAN ASSISTANT

## 2019-04-30 PROCEDURE — 86901 BLOOD TYPING SEROLOGIC RH(D): CPT | Performed by: PHYSICIAN ASSISTANT

## 2019-04-30 PROCEDURE — 80053 COMPREHEN METABOLIC PANEL: CPT | Performed by: PHYSICIAN ASSISTANT

## 2019-04-30 PROCEDURE — 25800030 ZZH RX IP 258 OP 636: Performed by: HOSPITALIST

## 2019-04-30 RX ORDER — ONDANSETRON 2 MG/ML
4 INJECTION INTRAMUSCULAR; INTRAVENOUS EVERY 6 HOURS PRN
Status: DISCONTINUED | OUTPATIENT
Start: 2019-04-30 | End: 2019-05-02 | Stop reason: HOSPADM

## 2019-04-30 RX ORDER — AMOXICILLIN 250 MG
2 CAPSULE ORAL 2 TIMES DAILY PRN
Status: DISCONTINUED | OUTPATIENT
Start: 2019-04-30 | End: 2019-05-02 | Stop reason: HOSPADM

## 2019-04-30 RX ORDER — AMOXICILLIN 250 MG
1 CAPSULE ORAL 2 TIMES DAILY PRN
Status: DISCONTINUED | OUTPATIENT
Start: 2019-04-30 | End: 2019-05-02 | Stop reason: HOSPADM

## 2019-04-30 RX ORDER — ONDANSETRON 4 MG/1
4 TABLET, ORALLY DISINTEGRATING ORAL EVERY 6 HOURS PRN
Status: DISCONTINUED | OUTPATIENT
Start: 2019-04-30 | End: 2019-05-02 | Stop reason: HOSPADM

## 2019-04-30 RX ORDER — SODIUM CHLORIDE 9 MG/ML
INJECTION, SOLUTION INTRAVENOUS CONTINUOUS
Status: DISCONTINUED | OUTPATIENT
Start: 2019-04-30 | End: 2019-05-02

## 2019-04-30 RX ORDER — ACETAMINOPHEN 325 MG/1
650 TABLET ORAL EVERY 4 HOURS PRN
Status: DISCONTINUED | OUTPATIENT
Start: 2019-04-30 | End: 2019-05-02 | Stop reason: HOSPADM

## 2019-04-30 RX ORDER — NALOXONE HYDROCHLORIDE 0.4 MG/ML
.1-.4 INJECTION, SOLUTION INTRAMUSCULAR; INTRAVENOUS; SUBCUTANEOUS
Status: DISCONTINUED | OUTPATIENT
Start: 2019-04-30 | End: 2019-05-02 | Stop reason: HOSPADM

## 2019-04-30 RX ORDER — PROCHLORPERAZINE MALEATE 5 MG
5 TABLET ORAL EVERY 6 HOURS PRN
Status: DISCONTINUED | OUTPATIENT
Start: 2019-04-30 | End: 2019-05-02 | Stop reason: HOSPADM

## 2019-04-30 RX ORDER — PROCHLORPERAZINE 25 MG
12.5 SUPPOSITORY, RECTAL RECTAL EVERY 12 HOURS PRN
Status: DISCONTINUED | OUTPATIENT
Start: 2019-04-30 | End: 2019-05-02 | Stop reason: HOSPADM

## 2019-04-30 RX ORDER — SODIUM CHLORIDE 9 MG/ML
1000 INJECTION, SOLUTION INTRAVENOUS CONTINUOUS
Status: DISCONTINUED | OUTPATIENT
Start: 2019-04-30 | End: 2019-04-30 | Stop reason: HOSPADM

## 2019-04-30 RX ADMIN — SODIUM CHLORIDE 1000 ML: 9 INJECTION, SOLUTION INTRAVENOUS at 18:12

## 2019-04-30 RX ADMIN — SODIUM CHLORIDE: 9 INJECTION, SOLUTION INTRAVENOUS at 23:32

## 2019-04-30 RX ADMIN — PANTOPRAZOLE SODIUM 40 MG: 40 INJECTION, POWDER, FOR SOLUTION INTRAVENOUS at 18:00

## 2019-04-30 NOTE — ED TRIAGE NOTES
"Pt comes in with complaints of weakness and fatigue since this AM. Pt states \"I think I'm bleeding internally.\" Pt states this happened a few years ago. Pt has had dark stools since yesterday. Pt denies bright red blood.    "

## 2019-04-30 NOTE — TELEPHONE ENCOUNTER
Reason for call:  Patient reporting a symptom    Symptom or request: black stools and weakness    Duration (how long have symptoms been present): yesterday    Have you been treated for this before? Yes - years ago    Additional comments:     Phone Number patient can be reached at:  Cell number on file:    Telephone Information:   Mobile [phone]     Best Time:  any    Can we leave a detailed message on this number:  YES    Call taken on 4/30/2019 at 10:28 AM by Zuri Orellana

## 2019-04-30 NOTE — TELEPHONE ENCOUNTER
Patient called back and decided to go straight to the ED.   Thank you,  Julissa Orellana   for [facility]

## 2019-04-30 NOTE — ED PROVIDER NOTES
History     Chief Complaint   Patient presents with     Generalized Weakness     Fatigue     The history is provided by the patient.     Jarrod Lewis is a 68 year old male who presents to the emergency department for generalized weakness and fatigue. Patient reports feeling very weak and fatigued since this morning. He states it is worse when standing or walking. If he is sitting he is fine. Patient noticed yesterday dark bowel movement with slight redness, has had again today. He denies any pain, abdominal pain, bloating, vomiting or nausea. Patient reports having a head cold, cough, runny nose, congestion for 3 weeks, which started the first part of April. He states he was taking Tylenol and aspirin for his symptoms and he thinks that may have caused these symptoms, bleeding internally. He states this happened a few years ago also. He states his cold symptoms have resolved now. He reports since his last endoscopy which was 6/17 he had been doing very well. He drinks a couple cups of coffee a day. He quit smoking 9 years ago. He states he takes his Omeprazole or Zantac as needed, he had taken one 1 month ago and then 1 today.          Upper GI Endoscopy 06/12/2017  9:21 [place] [address] [phone]     Endoscopy Department   _______________________________________________________________________________   Patient Name: Jarrod Lewis           Procedure Date: 6/12/2017 9:21 AM   MRN: [id number]                       Account Number: [id number]   YOB: 1950              Admit Type: Outpatient   Age: 66                               Room: Room 1   Gender: Male                          Note Status: Finalized   Attending MD: Sylvester Lucas MD Total Sedation Time:   _______________________________________________________________________________       Procedure:           Upper GI endoscopy   Indications:         Follow-up of  gastric ulcer   Providers:           Sylvester Lucas MD   Referring MD:        Adria Cowart DO   Medicines:           Midazolam 5 mg IV, Fentanyl 50 micrograms IV, Lidocaine                        gel   Complications:       No immediate complications.   _______________________________________________________________________________   Procedure:           Pre-Anesthesia Assessment:                        - Prior to the procedure, a History and Physical was                        performed, and patient medications, allergies and                        sensitivities were reviewed. The patient's tolerance of                        previous anesthesia was reviewed.                        After obtaining informed consent, the endoscope was                        passed under direct vision. Throughout the procedure,                        the patient's blood pressure, pulse, and oxygen                        saturations were monitored continuously. The Endoscope                        was introduced through the mouth, and advanced to the                        third part of duodenum.                                                                                     Findings:        The esophagus was normal.        The stomach was normal.        The examined duodenum was normal.                                                                                     Moderate Sedation:        Moderate (conscious) sedation was administered by the endoscopy nurse        and supervised by the endoscopist. The following parameters were        monitored: oxygen saturation, heart rate, blood pressure, respiratory        rate, EKG, adequacy of pulmonary ventilation, and response to care.        Total physician intraservice time was 16 minutes.   Impression:          - Normal esophagus.                        - Normal stomach.                        - Normal examined duodenum.   Recommendation:      - Discharge patient to  home.                        - Discontinue Carafate but continue the omeprazole 20 mg                        daily.                        - Patient has a contact number available for                        emergencies. The signs and symptoms of potential delayed                        complications were discussed with the patient. Return to                        normal activities tomorrow. Written discharge                        instructions were provided to the patient.                                                                                       ______________________   Sylvester Lucas MD        Upper GI Endoscopy 04/24/2017  2:14 PM [address] [phone]     Endoscopy Department   _______________________________________________________________________________   Patient Name: Jarrod Lewis           Procedure Date: 4/24/2017 2:14 PM   MRN: [id number]                       Account Number: [id number]   YOB: 1950              Admit Type: Outpatient   Age: 66                               Room: Room 1   Gender: Male                          Note Status: Finalized   Attending MD: Puma Dominguez MD    Total Sedation Time:   _______________________________________________________________________________       Procedure:           Upper GI endoscopy   Indications:         Epigastric abdominal pain, Dyspepsia, Suspected                        esophageal reflux, Exclusion of Ford's esophagus   Providers:           Puma Dominguez MD   Referring MD:        Adria Cowart MD   Medicines:           Midazolam 5 mg IV, Fentanyl 50 micrograms IV, Lidocaine                        gel   Complications:       No immediate complications.   _______________________________________________________________________________   Procedure:           Pre-Anesthesia Assessment:                        - Prior to the procedure, a  History and Physical was                        performed, and patient medications, allergies and                        sensitivities were reviewed. The patient's tolerance of                        previous anesthesia was reviewed.                        - The risks and benefits of the procedure and the                        sedation options and risks were discussed with the                        patient. All questions were answered and informed                        consent was obtained.                        - ASA Grade Assessment: II - A patient with mild                        systemic disease.                        - Prior to the procedure risks and complications were                        discussed with the patient including, but not limited                        to, perforation, missing lesions, bleeding, infections                        and adverse sedation complications.                        After obtaining informed consent, the endoscope was                        passed under direct vision. Throughout the procedure,                        the patient's blood pressure, pulse, and oxygen                        saturations were monitored continuously. The Endoscope                        was introduced through the mouth, and advanced to the                        third part of duodenum. The upper GI endoscopy was                        accomplished without difficulty. The patient tolerated                        the procedure well.                                                                                     Findings:        The esophagus was normal.        The Z-line was regular and was found at the gastroesophageal junction.        A few localized erosions without bleeding were found in the duodenal        bulb and in the first part of the duodenum.        The cardia and gastric fundus were normal on retroflexion.        Many non-bleeding superficial gastric ulcers with no stigmata of         bleeding were found in the gastric antrum and in the prepyloric region        of the stomach. The largest lesion was 6 mm in largest dimension.        Biopsies were taken with a cold forceps for Helicobacter pylori testing.        Biopsies were taken with a cold forceps for histology.                                                                                     Impression:          - Normal esophagus.                        - Z-line regular, at the gastroesophageal junction.                        - Duodenal erosions without bleeding.                        - Gastric ulcers with clean base. Biopsied.   Recommendation:      - Await pathology results.                        - Discharge patient to home.                        - Repeat the upper endoscopy in 8 weeks to check healing.                        - Use Prilosec (omeprazole) 40 mg PO daily.                        - If H. pylori noted would Rx appropriately.                        - Stop all ASA and NSAIDs                                                                                       ____________________   Puma Dominguez MD            Allergies:  Allergies   Allergen Reactions     Penicillins Unknown     As a child       Zithromax [Azithromycin Dihydrate]      diarrhea       Problem List:    Patient Active Problem List    Diagnosis Date Noted     Upper GI bleed 04/30/2019     Priority: Medium     Allergic rhinitis due to dust mite 01/09/2019     Priority: Medium     Sinus pressure 01/09/2019     Priority: Medium     Seasonal allergic rhinitis due to pollen 01/09/2019     Priority: Medium     Allergic rhinitis due to mold 01/09/2019     Priority: Medium     Penicillin allergy 01/09/2019     Priority: Medium     Advanced directives, counseling/discussion 12/09/2016     Priority: Medium     Info given       GI bleed 10/21/2016     Priority: Medium     Reducible right inguinal hernia 08/11/2016     Priority: Medium     CARDIOVASCULAR SCREENING;  LDL GOAL LESS THAN 130 2012     Priority: Medium        Past Medical History:    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       Past Surgical History:    Past Surgical History:   Procedure Laterality Date     C TOTAL KNEE ARTHROPLASTY Bilateral      COLONOSCOPY  2012    Procedure: COLONOSCOPY;  Colonoscopy;  Surgeon: Sylvester Lucas MD;  Location: [facility]     COLONOSCOPY N/A 10/26/2016    Procedure: COMBINED COLONOSCOPY, SINGLE OR MULTIPLE BIOPSY/POLYPECTOMY BY BIOPSY;  Surgeon: Trev Oquendo MD;  Location: [facility]     ESOPHAGOSCOPY, GASTROSCOPY, DUODENOSCOPY (EGD), COMBINED N/A 2017    Procedure: COMBINED ESOPHAGOSCOPY, GASTROSCOPY, DUODENOSCOPY (EGD), BIOPSY SINGLE OR MULTIPLE;  ESOPHAGOSCOPY, GASTROSCOPY, DUODENOSCOPY (EGD) with biopsies by forceps;  Surgeon: Puma Dominguez MD;  Location: [facility]     ESOPHAGOSCOPY, GASTROSCOPY, DUODENOSCOPY (EGD), COMBINED N/A 2017    Procedure: COMBINED ESOPHAGOSCOPY, GASTROSCOPY, DUODENOSCOPY (EGD);  ESOPHAGOSCOPY, GASTROSCOPY, DUODENOSCOPY (EGD);  Surgeon: Sylvester Lucas MD;  Location: [facility]     HERNIORRHAPHY INGUINAL Right 3/8/2017    Procedure: HERNIORRHAPHY INGUINAL;  Surgeon: Kong Lassiter MD;  Location: [facility]     JOINT REPLACEMENT, HIP RT/LT Right 2012     MOHS MICROGRAPHIC PROCEDURE Left 2017    Left cheek       Family History:    Family History   Problem Relation Age of Onset     Stomach Cancer Father          at age 46       Social History:  Marital Status:  Single [1]  Social History     Tobacco Use     Smoking status: Former Smoker     Types: Pipe     Last attempt to quit:      Years since quittin.3     Smokeless tobacco: Never Used   Substance Use Topics     Alcohol use: Yes     Alcohol/week: 0.0 oz     Comment: occasional     Drug use: No        Medications:      No current outpatient medications on  file.      Review of Systems   All other systems reviewed and are negative.      Physical Exam   BP: 127/69  Pulse: 88  Heart Rate: 90  Temp: 98  F (36.7  C)  Resp: 16  Weight: 129 kg (284 lb 4.8 oz)  SpO2: 99 %      Physical Exam   Constitutional: He is oriented to person, place, and time. He appears well-developed and well-nourished. No distress.   HENT:   Head: Normocephalic and atraumatic.   Right Ear: External ear normal.   Left Ear: External ear normal.   Nose: Nose normal.   Mouth/Throat: Oropharynx is clear and moist. No oropharyngeal exudate.   Eyes: Pupils are equal, round, and reactive to light. Conjunctivae and EOM are normal. Right eye exhibits no discharge. Left eye exhibits no discharge. No scleral icterus.   Neck: Normal range of motion. Neck supple. No thyromegaly present.   Cardiovascular: Normal rate, regular rhythm, normal heart sounds and intact distal pulses.   No murmur heard.  Pulmonary/Chest: Effort normal and breath sounds normal. No respiratory distress. He has no wheezes. He has no rales. He exhibits no tenderness.   Abdominal: Soft. Bowel sounds are normal. He exhibits no distension and no mass. There is no tenderness. There is no rebound and no guarding.   Musculoskeletal: Normal range of motion. He exhibits no edema, tenderness or deformity.   Lymphadenopathy:     He has no cervical adenopathy.   Neurological: He is alert and oriented to person, place, and time. No cranial nerve deficit.   Skin: Skin is warm and dry. Capillary refill takes less than 2 seconds. No rash noted. He is not diaphoretic. No erythema. There is pallor.   Psychiatric: He has a normal mood and affect. His behavior is normal. Thought content normal.   Nursing note and vitals reviewed.      ED Course        Procedures               Critical Care time:  none               Results for orders placed or performed during the hospital encounter of 04/30/19 (from the past 24 hour(s))   CBC with platelets differential    Result Value Ref Range    WBC 7.5 4.0 - 11.0 10e9/L    RBC Count 3.17 (L) 4.4 - 5.9 10e12/L    Hemoglobin 9.8 (L) 13.3 - 17.7 g/dL    Hematocrit 29.1 (L) 40.0 - 53.0 %    MCV 92 78 - 100 fl    MCH 30.9 26.5 - 33.0 pg    MCHC 33.7 31.5 - 36.5 g/dL    RDW 12.5 10.0 - 15.0 %    Platelet Count 245 150 - 450 10e9/L    Diff Method Automated Method     % Neutrophils 81.7 %    % Lymphocytes 10.7 %    % Monocytes 6.4 %    % Eosinophils 0.5 %    % Basophils 0.3 %    % Immature Granulocytes 0.4 %    Nucleated RBCs 0 0 /100    Absolute Neutrophil 6.1 1.6 - 8.3 10e9/L    Absolute Lymphocytes 0.8 0.8 - 5.3 10e9/L    Absolute Monocytes 0.5 0.0 - 1.3 10e9/L    Absolute Basophils 0.0 0.0 - 0.2 10e9/L    Abs Immature Granulocytes 0.0 0 - 0.4 10e9/L    Absolute Nucleated RBC 0.0    Comprehensive metabolic panel   Result Value Ref Range    Sodium 139 133 - 144 mmol/L    Potassium 4.0 3.4 - 5.3 mmol/L    Chloride 110 (H) 94 - 109 mmol/L    Carbon Dioxide 22 20 - 32 mmol/L    Anion Gap 7 3 - 14 mmol/L    Glucose 120 (H) 70 - 99 mg/dL    Urea Nitrogen 68 (H) 7 - 30 mg/dL    Creatinine 1.09 0.66 - 1.25 mg/dL    GFR Estimate 69 >60 mL/min/[1.73_m2]    GFR Estimate If Black 80 >60 mL/min/[1.73_m2]    Calcium 8.5 8.5 - 10.1 mg/dL    Bilirubin Total 0.4 0.2 - 1.3 mg/dL    Albumin 3.1 (L) 3.4 - 5.0 g/dL    Protein Total 6.3 (L) 6.8 - 8.8 g/dL    Alkaline Phosphatase 76 40 - 150 U/L    ALT 21 0 - 70 U/L    AST 11 0 - 45 U/L   INR   Result Value Ref Range    INR 1.06 0.86 - 1.14   Partial thromboplastin time   Result Value Ref Range    PTT 26 22 - 37 sec   ABO/Rh type and screen   Result Value Ref Range    ABO O     RH(D) Pos     Antibody Screen Neg     Test Valid Only At [facility]        Specimen Expires 05/03/2019    Occult blood stool   Result Value Ref Range    Occult Blood Positive (A) NEG^Negative   Hemoglobin   Result Value Ref Range    Hemoglobin 9.1 (L) 13.3 - 17.7 g/dL       Medications   pantoprazole (PROTONIX) 40  mg IV push injection (40 mg Intravenous Given 4/30/19 1800)   0.9% sodium chloride BOLUS (0 mLs Intravenous Stopped 4/30/19 1931)       Assessments & Plan (with Medical Decision Making)     GI bleed  Lightheadedness     68 year old male presents for evaluation of black stool that started with a bowel movement yesterday, and had a repeat black stool today.  When he woke up this morning started to feel weak, and lightheaded particularly with activity.    Prior history of GI bleed in 2017 with EGD showing gastric ulcer and duodenal erosions.  Treated successfully with PPI therapy and follow-up EGD showed healing of these conditions.  Has been NSAID and aspirin free since then until taking these medications again within the past 3 to 4 weeks for ongoing URI symptoms.  Ultimately treated for acute sinusitis recently.  Denies any out right rectal bleeding and has not had any coffee-ground emesis.  On exam blood pressure 111/68, pulse 81, temperature 97.5, and oxygen saturation 98% on room air.  Patient appears mildly pale, but otherwise has a normal exam.  No significant abdominal tenderness.  Laboratory evaluation with hemoglobin decreased down to 9.8.  We do not have a recent CBC to compare to.  White cell count normal at 7500 and platelets normal at 245,000.  INR and PTT levels normal.  Comprehensive metabolic panel with mild elevation in the glucose up to 120, but this is a nonfasting specimen.  Urea nitrogen mildly elevated at 68 with a normal creatinine suggestive of underlying dehydration.  Occult blood positive with black stool per examining finger.  Repeat hemoglobin 2 hours later at 9.1.  No active rectal bleeding.  He did not develop any worsening symptoms during his ED stay.  Patient did develop some lightheadedness and dizziness when standing, but resolved with sitting or laying supine.  Patient likely has a repeat GI bleed of gastric source as previously noted.  I did give him an IV dose of IV Protonix, and  1 L of IV normal saline.  We do not have gastroenterology available at our facility for further evaluation and EGD.  He was transferred to [facility] for evaluation overnight and consideration for EGD the following day.  Dr. Lopez, inpatient hospitalist, agreed to accept his care.  We did start the type and screen process, but did not transfuse him prior to leaving the ED given that his hemoglobin was not below 8, and he did not have any signs of active bleeding.  Vital signs stable.  Patient was in agreement with this plan.  Dr. Subramanian, ED MD, was involved with his care as well.     I have reviewed the nursing notes.    I have reviewed the findings, diagnosis, plan and need for follow up with the patient.          Medication List      There are no discharge medications for this visit.         Final diagnoses:   GI bleed   Lightheadedness     This document serves as a record of services personally performed by Toño Arana PA-C. It was created on their behalf by Marguerite Verde, a trained medical scribe. The creation of this record is based on the provider's personal observations and the statements of the patient. This document has been checked and approved by the attending provider.    Note: Chart documentation done in part with Dragon Voice Recognition software. Although reviewed after completion, some word and grammatical errors may remain.    4/30/2019   Toño Arana PA-C   [facility] EMERGENCY DEPARTMENT     Toño Arana PA-C  04/30/19 6311

## 2019-04-30 NOTE — TELEPHONE ENCOUNTER
Reason for Call:  Same Day Appointment, Requested Provider:  Adria Cowart DO    PCP: Adria Cowart    Reason for visit: black stools, dizziness, has hx of esophogeal bleeding    Duration of symptoms: see triage note    Have you been treated for this in the past? No    Additional comments: triage nurse states should be seen in 48 hours. Please advise if Dr Cowart can work him in tomorrow.     Can we leave a detailed message on this number? YES    Phone number patient can be reached at: Cell number on file [phone]    Best Time: any time    Call taken on 4/30/2019 at 10:39 AM by Hafsa Herring

## 2019-04-30 NOTE — TELEPHONE ENCOUNTER
Jarrod Lewis is a 68 year old male who calls with black stools and beginning weakness.    NURSING ASSESSMENT:  Description:  Patient states a couple of years ago he had a problem with bleeding from his esophagus and his hemoglobin was low.  Patient states that he believes he had the same symptoms back then that he is experiencing now.  He noticed he had some black stools/ diarrhea yesterday and is feeling some weakness this morning.   He stated that he is driving back home from [employer] and will call his son to meet him if symptoms worsen.  Patient denies Yellow frothy, bloody or green stool, signs of dehydration, fever, vomiting, rapid breathing, abdominal pain, swelling and fever.  Advised patient to go to ER for worsening symptoms.  Sent patient to Scheduling for OV on returning home, tomorrow if possible.      Onset/duration:  1 day  Precip. factors:  unknown  Associated symptoms:  Stated above   Improves/worsens symptoms:  NA  Pain scale (0-10)   0/10    Allergies:   Allergies   Allergen Reactions     Penicillins Unknown     As a child       Zithromax [Azithromycin Dihydrate]      diarrhea       NURSING PLAN: Nursing advice to patient stated above    RECOMMENDED DISPOSITION:  Home care advice -   Will comply with recommendation: Yes  If further questions/concerns or if symptoms do not improve, worsen or new symptoms develop, call your PCP or [facility] as soon as possible.      Guideline used: Diarrhea  Telephone Triage Protocols for Nurses, Fifth Edition, Zahra Schmitt RN

## 2019-05-01 ENCOUNTER — ANESTHESIA EVENT (OUTPATIENT)
Dept: GASTROENTEROLOGY | Facility: CLINIC | Age: 69
DRG: 378 | End: 2019-05-01
Payer: MEDICARE

## 2019-05-01 ENCOUNTER — ANESTHESIA (OUTPATIENT)
Dept: GASTROENTEROLOGY | Facility: CLINIC | Age: 69
DRG: 378 | End: 2019-05-01
Payer: MEDICARE

## 2019-05-01 LAB
ABO + RH BLD: NORMAL
ABO + RH BLD: NORMAL
ANION GAP SERPL CALCULATED.3IONS-SCNC: 7 MMOL/L (ref 3–14)
BLD GP AB SCN SERPL QL: NORMAL
BLD PROD TYP BPU: NORMAL
BLD PROD TYP BPU: NORMAL
BLD UNIT ID BPU: 0
BLOOD BANK CMNT PATIENT-IMP: NORMAL
BLOOD PRODUCT CODE: NORMAL
BPU ID: NORMAL
BUN SERPL-MCNC: 47 MG/DL (ref 7–30)
CALCIUM SERPL-MCNC: 8.3 MG/DL (ref 8.5–10.1)
CHLORIDE SERPL-SCNC: 116 MMOL/L (ref 94–109)
CO2 SERPL-SCNC: 21 MMOL/L (ref 20–32)
CREAT SERPL-MCNC: 0.91 MG/DL (ref 0.66–1.25)
ERYTHROCYTE [DISTWIDTH] IN BLOOD BY AUTOMATED COUNT: 13.1 % (ref 10–15)
GFR SERPL CREATININE-BSD FRML MDRD: 86 ML/MIN/{1.73_M2}
GLUCOSE SERPL-MCNC: 99 MG/DL (ref 70–99)
HCT VFR BLD AUTO: 27 % (ref 40–53)
HGB BLD-MCNC: 8.3 G/DL (ref 13.3–17.7)
HGB BLD-MCNC: 8.9 G/DL (ref 13.3–17.7)
HGB BLD-MCNC: 9.2 G/DL (ref 13.3–17.7)
HGB BLD-MCNC: 9.5 G/DL (ref 13.3–17.7)
MCH RBC QN AUTO: 30.4 PG (ref 26.5–33)
MCHC RBC AUTO-ENTMCNC: 34.1 G/DL (ref 31.5–36.5)
MCV RBC AUTO: 89 FL (ref 78–100)
NUM BPU REQUESTED: 1
PLATELET # BLD AUTO: 195 10E9/L (ref 150–450)
POTASSIUM SERPL-SCNC: 3.9 MMOL/L (ref 3.4–5.3)
RBC # BLD AUTO: 3.03 10E12/L (ref 4.4–5.9)
SODIUM SERPL-SCNC: 144 MMOL/L (ref 133–144)
SPECIMEN EXP DATE BLD: NORMAL
TRANSFUSION STATUS PATIENT QL: NORMAL
TRANSFUSION STATUS PATIENT QL: NORMAL
UPPER GI ENDOSCOPY: NORMAL
WBC # BLD AUTO: 5.4 10E9/L (ref 4–11)

## 2019-05-01 PROCEDURE — 93005 ELECTROCARDIOGRAM TRACING: CPT

## 2019-05-01 PROCEDURE — 0DJ08ZZ INSPECTION OF UPPER INTESTINAL TRACT, VIA NATURAL OR ARTIFICIAL OPENING ENDOSCOPIC: ICD-10-PCS | Performed by: INTERNAL MEDICINE

## 2019-05-01 PROCEDURE — 86923 COMPATIBILITY TEST ELECTRIC: CPT | Performed by: HOSPITALIST

## 2019-05-01 PROCEDURE — C9113 INJ PANTOPRAZOLE SODIUM, VIA: HCPCS | Performed by: HOSPITALIST

## 2019-05-01 PROCEDURE — 37000008 ZZH ANESTHESIA TECHNICAL FEE, 1ST 30 MIN: Performed by: INTERNAL MEDICINE

## 2019-05-01 PROCEDURE — 40000010 ZZH STATISTIC ANES STAT CODE-CRNA PER MINUTE: Performed by: INTERNAL MEDICINE

## 2019-05-01 PROCEDURE — 86901 BLOOD TYPING SEROLOGIC RH(D): CPT | Performed by: HOSPITALIST

## 2019-05-01 PROCEDURE — 25000132 ZZH RX MED GY IP 250 OP 250 PS 637: Performed by: INTERNAL MEDICINE

## 2019-05-01 PROCEDURE — 25000128 H RX IP 250 OP 636: Performed by: NURSE ANESTHETIST, CERTIFIED REGISTERED

## 2019-05-01 PROCEDURE — 43235 EGD DIAGNOSTIC BRUSH WASH: CPT | Performed by: INTERNAL MEDICINE

## 2019-05-01 PROCEDURE — P9016 RBC LEUKOCYTES REDUCED: HCPCS | Performed by: HOSPITALIST

## 2019-05-01 PROCEDURE — 86900 BLOOD TYPING SEROLOGIC ABO: CPT | Performed by: HOSPITALIST

## 2019-05-01 PROCEDURE — 25000125 ZZHC RX 250: Performed by: NURSE ANESTHETIST, CERTIFIED REGISTERED

## 2019-05-01 PROCEDURE — 25800030 ZZH RX IP 258 OP 636: Performed by: HOSPITALIST

## 2019-05-01 PROCEDURE — 85018 HEMOGLOBIN: CPT | Performed by: HOSPITALIST

## 2019-05-01 PROCEDURE — 36415 COLL VENOUS BLD VENIPUNCTURE: CPT | Performed by: HOSPITALIST

## 2019-05-01 PROCEDURE — 93010 ELECTROCARDIOGRAM REPORT: CPT | Performed by: INTERNAL MEDICINE

## 2019-05-01 PROCEDURE — A9270 NON-COVERED ITEM OR SERVICE: HCPCS | Performed by: INTERNAL MEDICINE

## 2019-05-01 PROCEDURE — 25000128 H RX IP 250 OP 636: Performed by: HOSPITALIST

## 2019-05-01 PROCEDURE — 37000009 ZZH ANESTHESIA TECHNICAL FEE, EACH ADDTL 15 MIN: Performed by: INTERNAL MEDICINE

## 2019-05-01 PROCEDURE — 25800030 ZZH RX IP 258 OP 636: Performed by: NURSE ANESTHETIST, CERTIFIED REGISTERED

## 2019-05-01 PROCEDURE — 12000000 ZZH R&B MED SURG/OB

## 2019-05-01 PROCEDURE — 25000566 ZZH SEVOFLURANE, EA 15 MIN: Performed by: INTERNAL MEDICINE

## 2019-05-01 PROCEDURE — 80048 BASIC METABOLIC PNL TOTAL CA: CPT | Performed by: HOSPITALIST

## 2019-05-01 PROCEDURE — 86850 RBC ANTIBODY SCREEN: CPT | Performed by: HOSPITALIST

## 2019-05-01 PROCEDURE — 85027 COMPLETE CBC AUTOMATED: CPT | Performed by: HOSPITALIST

## 2019-05-01 PROCEDURE — 99232 SBSQ HOSP IP/OBS MODERATE 35: CPT | Performed by: HOSPITALIST

## 2019-05-01 RX ORDER — DEXAMETHASONE SODIUM PHOSPHATE 4 MG/ML
INJECTION, SOLUTION INTRA-ARTICULAR; INTRALESIONAL; INTRAMUSCULAR; INTRAVENOUS; SOFT TISSUE PRN
Status: DISCONTINUED | OUTPATIENT
Start: 2019-05-01 | End: 2019-05-01

## 2019-05-01 RX ORDER — LIDOCAINE HYDROCHLORIDE 20 MG/ML
INJECTION, SOLUTION INFILTRATION; PERINEURAL PRN
Status: DISCONTINUED | OUTPATIENT
Start: 2019-05-01 | End: 2019-05-01

## 2019-05-01 RX ORDER — SODIUM CHLORIDE, SODIUM LACTATE, POTASSIUM CHLORIDE, CALCIUM CHLORIDE 600; 310; 30; 20 MG/100ML; MG/100ML; MG/100ML; MG/100ML
INJECTION, SOLUTION INTRAVENOUS CONTINUOUS PRN
Status: DISCONTINUED | OUTPATIENT
Start: 2019-05-01 | End: 2019-05-01

## 2019-05-01 RX ORDER — PROPOFOL 10 MG/ML
INJECTION, EMULSION INTRAVENOUS PRN
Status: DISCONTINUED | OUTPATIENT
Start: 2019-05-01 | End: 2019-05-01

## 2019-05-01 RX ORDER — OMEPRAZOLE 40 MG/1
40 CAPSULE, DELAYED RELEASE ORAL DAILY PRN
Status: ON HOLD | COMMUNITY
End: 2019-05-02

## 2019-05-01 RX ORDER — ONDANSETRON 2 MG/ML
INJECTION INTRAMUSCULAR; INTRAVENOUS PRN
Status: DISCONTINUED | OUTPATIENT
Start: 2019-05-01 | End: 2019-05-01

## 2019-05-01 RX ORDER — LORATADINE 10 MG/1
10 TABLET ORAL DAILY
COMMUNITY

## 2019-05-01 RX ORDER — NALOXONE HYDROCHLORIDE 0.4 MG/ML
.1-.4 INJECTION, SOLUTION INTRAMUSCULAR; INTRAVENOUS; SUBCUTANEOUS
Status: DISCONTINUED | OUTPATIENT
Start: 2019-05-01 | End: 2019-05-01

## 2019-05-01 RX ORDER — EPHEDRINE SULFATE 50 MG/ML
INJECTION, SOLUTION INTRAMUSCULAR; INTRAVENOUS; SUBCUTANEOUS PRN
Status: DISCONTINUED | OUTPATIENT
Start: 2019-05-01 | End: 2019-05-01

## 2019-05-01 RX ORDER — OCTREOTIDE ACETATE 50 UG/ML
50 INJECTION, SOLUTION INTRAVENOUS; SUBCUTANEOUS ONCE
Status: COMPLETED | OUTPATIENT
Start: 2019-05-01 | End: 2019-05-01

## 2019-05-01 RX ORDER — LIDOCAINE 40 MG/G
CREAM TOPICAL
Status: CANCELLED | OUTPATIENT
Start: 2019-05-01

## 2019-05-01 RX ORDER — FLUMAZENIL 0.1 MG/ML
0.2 INJECTION, SOLUTION INTRAVENOUS
Status: ACTIVE | OUTPATIENT
Start: 2019-05-01 | End: 2019-05-02

## 2019-05-01 RX ORDER — PANTOPRAZOLE SODIUM 40 MG/1
40 TABLET, DELAYED RELEASE ORAL
Status: DISCONTINUED | OUTPATIENT
Start: 2019-05-01 | End: 2019-05-02 | Stop reason: HOSPADM

## 2019-05-01 RX ADMIN — ONDANSETRON 4 MG: 2 INJECTION INTRAMUSCULAR; INTRAVENOUS at 14:30

## 2019-05-01 RX ADMIN — PANTOPRAZOLE SODIUM 40 MG: 40 TABLET, DELAYED RELEASE ORAL at 17:40

## 2019-05-01 RX ADMIN — SODIUM CHLORIDE 80 MG: 9 INJECTION, SOLUTION INTRAVENOUS at 00:20

## 2019-05-01 RX ADMIN — DEXAMETHASONE SODIUM PHOSPHATE 4 MG: 4 INJECTION, SOLUTION INTRA-ARTICULAR; INTRALESIONAL; INTRAMUSCULAR; INTRAVENOUS; SOFT TISSUE at 14:30

## 2019-05-01 RX ADMIN — OCTREOTIDE ACETATE 50 MCG: 50 INJECTION, SOLUTION INTRAVENOUS; SUBCUTANEOUS at 05:18

## 2019-05-01 RX ADMIN — OCTREOTIDE ACETATE 50 MCG/HR: 200 INJECTION, SOLUTION INTRAVENOUS; SUBCUTANEOUS at 05:21

## 2019-05-01 RX ADMIN — LIDOCAINE HYDROCHLORIDE 100 MG: 20 INJECTION, SOLUTION INFILTRATION; PERINEURAL at 14:24

## 2019-05-01 RX ADMIN — SODIUM CHLORIDE 8 MG/HR: 9 INJECTION, SOLUTION INTRAVENOUS at 00:53

## 2019-05-01 RX ADMIN — PROPOFOL 200 MG: 10 INJECTION, EMULSION INTRAVENOUS at 14:24

## 2019-05-01 RX ADMIN — SODIUM CHLORIDE: 9 INJECTION, SOLUTION INTRAVENOUS at 05:40

## 2019-05-01 RX ADMIN — SODIUM CHLORIDE: 9 INJECTION, SOLUTION INTRAVENOUS at 18:30

## 2019-05-01 RX ADMIN — SODIUM CHLORIDE, POTASSIUM CHLORIDE, SODIUM LACTATE AND CALCIUM CHLORIDE: 600; 310; 30; 20 INJECTION, SOLUTION INTRAVENOUS at 14:16

## 2019-05-01 RX ADMIN — Medication 10 MG: at 14:34

## 2019-05-01 RX ADMIN — SUCCINYLCHOLINE CHLORIDE 100 MG: 20 INJECTION, SOLUTION INTRAMUSCULAR; INTRAVENOUS; PARENTERAL at 14:24

## 2019-05-01 RX ADMIN — SODIUM CHLORIDE 8 MG/HR: 9 INJECTION, SOLUTION INTRAVENOUS at 12:51

## 2019-05-01 ASSESSMENT — ACTIVITIES OF DAILY LIVING (ADL)
ADLS_ACUITY_SCORE: 11
RETIRED_COMMUNICATION: 0-->UNDERSTANDS/COMMUNICATES WITHOUT DIFFICULTY
AMBULATION: 0-->INDEPENDENT
TRANSFERRING: 0-->INDEPENDENT
ADLS_ACUITY_SCORE: 11
COGNITION: 0 - NO COGNITION ISSUES REPORTED
ADLS_ACUITY_SCORE: 11
RETIRED_EATING: 0-->INDEPENDENT
DRESS: 0-->INDEPENDENT
SWALLOWING: 0-->SWALLOWS FOODS/LIQUIDS WITHOUT DIFFICULTY
FALL_HISTORY_WITHIN_LAST_SIX_MONTHS: NO
TOILETING: 0-->INDEPENDENT
BATHING: 0-->INDEPENDENT
ADLS_ACUITY_SCORE: 11
ADLS_ACUITY_SCORE: 15

## 2019-05-01 ASSESSMENT — ENCOUNTER SYMPTOMS: SEIZURES: 0

## 2019-05-01 ASSESSMENT — LIFESTYLE VARIABLES: TOBACCO_USE: 0

## 2019-05-01 NOTE — H&P
[facility]    History and Physical  Hospitalist       Date of Admission:  4/30/2019    Assessment & Plan   Jarrod Lewis is a 68 year old male who presented to [facility] with dark stools and fatigue, transferred to [facility] for gastroenterology evaluation for suspected upper GI bleed     Upper GI Bleed  Hx Gastric ulcers and duodenal erosions  Melenic stools note on 4/29 AM, continued today. Associated with weakness and fatigue. Hx gastric and duodenal erosions/ulcers in April 2017. He was advised to stop ASA, NSAIDs and to take daily omeprazole. On repeat EGD June 2017 he had evidence of healing and was recommended to continue omeprazole 20mg daily at that time. He does not take a PPI regularly.  - Admit inpatient  - GI consult  - NPO for likely EGD in AM  - Protonix gtt  - IVF @125ml/hr  - Hemoglobin q6h.  - q4h vitals  - Start octreotide (do not suspect variceal bleed)  - Consented for blood transfusion    Acute blood loss anemia   Hgb at baseline 13.1, 9.8 on presentation at [facility], down to 8.3 now (secondary to above). Consented for blood transfusion on admission.  - Considering hemoglobin drop of 1.5 over about 7 hours, with associated lower BP and feeling of lightheadedness, presume actively bleeding  - Give 1 unit PRBCs now, recheck Hgb in AM    Hx Arthritis  -Avoid NSAIDs, tylenol for pain only    DVT Prophylaxis: Pneumatic Compression Devices  Code Status: DNR, okay to intubate per discussion with patient--he does not want CPR  Expected discharge: 2 - 3 days, recommended to prior living arrangement once GI workup completed    Urszula Cruz DO    Primary Care Physician   Adria Cowart    Chief Complaint   Dark stools    History is obtained from the patient    History of Present Illness   Jarrod Lewis is a 68 year old male who presents with black stools that began on 4/29 with acute onset of fatigue/weakness that began on 4/30. He had history of gastric and  "duodenal ulcers in 2017 and was advised to take PPI and stop ASA/NSAIDS. He says at that time his hgb went down to 9, but he did not require a blood transfusion. He only takes omeprazole intermittently, had a dose today, but prior to that was last month. He denies pain, indigestion, chest pain, shortness of breath, nausea, vomiting. Reports that his fatigue was so that he was unable to have the energy to perform the duties of his job as a  and that he knew he was in bad shape when he barely had the energy to unstrap a load. He reports feeling lightheaded while getting up and around  Reports that at the beginning of March he went on a motorcycle trip and began to have UR symptoms with nasal congestion and sore throat and began taking nyquil, dayquil, tylenol and \"probably some aspirin\" to treat his symptoms. He had no improvement and was seen by his PCP on 4/15 and was prescribed claritin and doxycycline x1 week.    Past Medical History    I have reviewed this patient's medical history and updated it with pertinent information if needed.   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  Past Surgical History:   Procedure Laterality Date     C TOTAL KNEE ARTHROPLASTY Bilateral      COLONOSCOPY  12/14/2012    Procedure: COLONOSCOPY;  Colonoscopy;  Surgeon: Sylvester Lucas MD;  Location:  GI     COLONOSCOPY N/A 10/26/2016    Procedure: COMBINED COLONOSCOPY, SINGLE OR MULTIPLE BIOPSY/POLYPECTOMY BY BIOPSY;  Surgeon: Trev Oquendo MD;  Location:  GI     ESOPHAGOSCOPY, GASTROSCOPY, DUODENOSCOPY (EGD), COMBINED N/A 4/24/2017    Procedure: COMBINED ESOPHAGOSCOPY, GASTROSCOPY, DUODENOSCOPY (EGD), BIOPSY SINGLE OR MULTIPLE;  " ESOPHAGOSCOPY, GASTROSCOPY, DUODENOSCOPY (EGD) with biopsies by forceps;  Surgeon: Puma Dominguez MD;  Location: [facility]     ESOPHAGOSCOPY, GASTROSCOPY, DUODENOSCOPY (EGD), COMBINED N/A 6/12/2017    Procedure: COMBINED ESOPHAGOSCOPY, GASTROSCOPY, DUODENOSCOPY (EGD);  ESOPHAGOSCOPY, GASTROSCOPY, DUODENOSCOPY (EGD);  Surgeon: Sylvester Lucas MD;  [facility]     HERNIORRHAPHY INGUINAL Right 3/8/2017    Procedure: HERNIORRHAPHY INGUINAL;  Surgeon: Kong Lassiter MD;  Location: [facility]     JOINT REPLACEMENT, HIP RT/LT Right 02/03/2012     MOHS MICROGRAPHIC PROCEDURE Left 01/02/2017    Left cheek       Prior to Admission Medications   Prior to Admission Medications   Prescriptions Last Dose Informant Patient Reported? Taking?   acetaminophen (TYLENOL) 325 MG tablet   Yes No   Sig: Take 650 mg by mouth every 4 hours as needed for mild pain or fever Reported on 3/6/2017   omeprazole (PRILOSEC) 40 MG capsule   No No   Sig: Take 1 capsule (40 mg) by mouth daily Take 30-60 minutes before a meal.   triamcinolone (KENALOG) 0.1 % external ointment   No No   Sig: Apply sparingly to affected area three times daily as needed.   triamcinolone (NASACORT) 55 MCG/ACT nasal aerosol   No No   Sig: Spray 2 sprays into both nostrils daily      Facility-Administered Medications: None     Allergies   Allergies   Allergen Reactions     Penicillins Unknown     As a child       Zithromax [Azithromycin Dihydrate]      diarrhea       Social History   I have reviewed this patient's social history and updated it with pertinent information if needed. Jarrod Lewis  reports that he quit smoking about 7 years ago. His smoking use included pipe. He has never used smokeless tobacco. He reports that he drinks alcohol. He reports that he does not use drugs.    Family History   I have reviewed this patient's family history and updated it with pertinent information if needed.   Family History   Problem Relation Age of Onset     Stomach Cancer Father           at age 46       Review of Systems   The 10 point Review of Systems is negative other than noted in the HPI    Physical Exam   Temp: 97.8  F (36.6  C) Temp src: Oral BP: 97/50   Heart Rate: 74 Resp: 16 SpO2: 97 % O2 Device: None (Room air)    Vital Signs with Ranges  Temp:  [97.5  F (36.4  C)-98  F (36.7  C)] 97.8  F (36.6  C)  Pulse:  [] 90  Heart Rate:  [74-90] 74  Resp:  [16] 16  BP: ()/(50-87) 97/50  SpO2:  [97 %-99 %] 97 %  0 lbs 0 oz    Constitutional: Awake, alert, cooperative, no apparent distress. Pale appearing  Eyes: Conjunctiva and pupils examined and normal.  HEENT: Moist mucous membranes, normal dentition.  Respiratory: Clear to auscultation bilaterally, no crackles or wheezing.  Cardiovascular: Regular rate and rhythm, normal S1 and S2, and no murmur noted.  GI: Soft, non-distended, non-tender, normal bowel sounds.  Lymph/Hematologic: No anterior cervical or supraclavicular adenopathy.  Skin: No rashes, no cyanosis, no edema.  Musculoskeletal: No joint swelling, erythema or tenderness.  Neurologic: Cranial nerves 2-12 intact, normal strength and sensation.  Psychiatric: Alert, oriented to person, place and time, no obvious anxiety or depression.    Data   Data reviewed today:  I personally reviewed no imaging or EKGs.  Recent Labs   Lab 19  0030 19  1736   WBC  --   --  7.5   HGB 8.3* 9.1* 9.8*   MCV  --   --  92   PLT  --   --  245   INR  --   --  1.06   NA  --   --  139   POTASSIUM  --   --  4.0   CHLORIDE  --   --  110*   CO2  --   --  22   BUN  --   --  68*   CR  --   --  1.09   ANIONGAP  --   --  7   DYLAN  --   --  8.5   GLC  --   --  120*   ALBUMIN  --   --  3.1*   PROTTOTAL  --   --  6.3*   BILITOTAL  --   --  0.4   ALKPHOS  --   --  76   ALT  --   --  21   AST  --   --  11       No results found for this or any previous visit (from the past 24 hour(s)).

## 2019-05-01 NOTE — ED NOTES
Assumed cares of pt from [facility].  Plans from transfer.  Discussed transfer with pt.  Pt was unsure of the transfer information.  Spoke with provider.  Provider in to speak with pt about transfer.

## 2019-05-01 NOTE — PLAN OF CARE
Admission    Patient arrives to room 605-1 via cart from [facility].      Inpatient nursing criteria listed below were met:    PCD's Documented: No  Skin issues/needs documented : NA  Isolation education started/completed NA  Patient allergies verified with patient: Yes  Verified completion of [place] Risk Assessment Tool:  Yes  Verified completion of Guardianship screening tool: NA  Fall Prevention: Care plan updated, Education given and documented Yes  Care Plan initiated: Yes  Home medications documented in belongings flowsheet: NA  Patient belongings documented in belongings flowsheet: Yes  Reminder note (belongings/ medications) placed in discharge instructions:Yes  Admission profile/ required documentation complete: Yes

## 2019-05-01 NOTE — ED NOTES
EMS here at this time to transfer pt.  Belongings sent with pt.  Called [facility] to update them that pt left.  [phone]

## 2019-05-01 NOTE — ANESTHESIA CARE TRANSFER NOTE
Patient: Jarrod Lewis    Procedure(s):  ESOPHAGOGASTRODUODENOSCOPY (EGD)    Diagnosis: melena  Diagnosis Additional Information: No value filed.    Anesthesia Type:   General, ETT     Note:  Airway :Face Mask  Patient transferred to:PACU  Handoff Report: Identifed the Patient, Identified the Reponsible Provider, Reviewed the pertinent medical history, Discussed the surgical course, Reviewed Intra-OP anesthesia mangement and issues during anesthesia, Set expectations for post-procedure period and Allowed opportunity for questions and acknowledgement of understanding      Vitals: (Last set prior to Anesthesia Care Transfer)    CRNA VITALS  5/1/2019 1413 - 5/1/2019 1450      5/1/2019             Pulse:  89    Ht Rate:  85    SpO2:  88    Resp Rate (observed):   8    Resp Rate (set):  10                Electronically Signed By: JO Irby CRNA  May 1, 2019  2:50 PM

## 2019-05-01 NOTE — PLAN OF CARE
DATE & TIME: 5/1/2019 7-3:30pm  ORIENTATION: A & O x4  BEHAVIOR & AGGRESSION TOOL COLOR: Green  CIWA SCORE: N/A  ABNL VS/O2: VSS on RA  MOBILITY: IND  PAIN MANAGMENT: Denies  DIET: NPO  BOWEL/BLADDER: No issues  ABNL LAB/BG: Hgb 9.2/8.9  DRAIN/DEVICES: 2 PIV both infusing  TELEMETRY RHYTHM: N/A  SKIN: Intact, WDL  TESTS/PROCEDURES: EGD went down around 1115  D/C DAY/GOALS/PLACE: Pending  OTHER IMPORTANT INFO: Orthos WDL this am. GI following.

## 2019-05-01 NOTE — ANESTHESIA POSTPROCEDURE EVALUATION
Patient: Jarrod Lewis    Procedure(s):  ESOPHAGOGASTRODUODENOSCOPY (EGD)    Diagnosis:melena  Diagnosis Additional Information: No value filed.    Anesthesia Type:  General, ETT    Note:  Anesthesia Post Evaluation    Patient location during evaluation: PACU  Patient participation: Able to fully participate in evaluation  Level of consciousness: awake  Pain management: adequate  Airway patency: patent  Cardiovascular status: acceptable  Respiratory status: acceptable  Hydration status: acceptable  PONV: none     Anesthetic complications: None          Last vitals:  Vitals:    05/01/19 1530 05/01/19 1540 05/01/19 1627   BP: 129/74 139/83 137/79   Pulse: 72 71 69   Resp: 11 11 16   Temp:   36.3  C (97.4  F)   SpO2: 94% 97% 97%         Electronically Signed By: Whitney Woodward  May 1, 2019  5:42 PM

## 2019-05-01 NOTE — CONSULTS
GASTROENTEROLOGY CONSULTATION     Jarrod Lewis   [address]  [place] [address]   68 year old male   Admission Date/Time: 4/30/2019   Encounter Date: 5/1/2019  Primary Care Provider: Adria Cowart     Referring / Attending Physician: Dr. Cruz   We were asked to see the patient in consultation by Dr. Cruz for evaluation of melena.     HPI: Jarrod Lewis is a 68 year old male with a history of arthritis and previous GI bleed due to gastric ulcers and duodenal erosions in 2017 who presents with a new onset of melena over the last 3 days.  He states that he had one black-colored stool on Monday, but didn't think much of it. He had 2 subsequent dark stools yesterday, prompting him to seek evaluation at [facility] for further evaluation.  Upon arrival, he was found to have a hemoglobin of 9.8 (baseline hemoglobin 13.1).  Occult blood stool test was positive.  He endorses some fatigue and dizziness, otherwise feels well with no abdominal pain, nausea, vomiting, or change in his bowel habits  He took Ibuprofen or aspirin on a daily basis for several weeks the beginning of April for a viral illness. He uses omeprazole as needed, but not daily.   EGD 4/2017 for melena showed many non-bleeding superficial gastric ulcers (largest 6mm) and a few erosions in the duodenal bulb and first part of the duodenum. Follow up EGD revealed a normal exam.   Colonoscopy 10/2016 showed diverticulosis and two colon polyps.   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 01/02/2017    Left cheek       Past Surgical History  Past Surgical History:   Procedure Laterality Date     C TOTAL KNEE ARTHROPLASTY Bilateral      COLONOSCOPY  12/14/2012    Procedure: COLONOSCOPY;  Colonoscopy;  Surgeon: Sylvester Lucas MD;  Location:  GI     COLONOSCOPY N/A 10/26/2016    Procedure:  COMBINED COLONOSCOPY, SINGLE OR MULTIPLE BIOPSY/POLYPECTOMY BY BIOPSY;  Surgeon: Trev Oquendo MD;  [facility]     ESOPHAGOSCOPY, GASTROSCOPY, DUODENOSCOPY (EGD), COMBINED N/A 2017    Procedure: COMBINED ESOPHAGOSCOPY, GASTROSCOPY, DUODENOSCOPY (EGD), BIOPSY SINGLE OR MULTIPLE;  ESOPHAGOSCOPY, GASTROSCOPY, DUODENOSCOPY (EGD) with biopsies by forceps;  Surgeon: Puma Dominguez MD;  [facility]     ESOPHAGOSCOPY, GASTROSCOPY, DUODENOSCOPY (EGD), COMBINED N/A 2017    Procedure: COMBINED ESOPHAGOSCOPY, GASTROSCOPY, DUODENOSCOPY (EGD);  ESOPHAGOSCOPY, GASTROSCOPY, DUODENOSCOPY (EGD);  Surgeon: Sylvester Lucas MD;  [facility]     HERNIORRHAPHY INGUINAL Right 3/8/2017    Procedure: HERNIORRHAPHY INGUINAL;  Surgeon: Kong Lassiter MD;  [facility]: [facility]     JOINT REPLACEMENT, HIP RT/LT Right 2012     MOHS MICROGRAPHIC PROCEDURE Left 2017    Left cheek       Family History  Family History   Problem Relation Age of Onset     Stomach Cancer Father          at age 46       Social History  Social History     Socioeconomic History     Marital status: Single     Spouse name: Not on file     Number of children: Not on file     Years of education: Not on file     Highest education level: Not on file   Occupational History     Occupation:    Social Needs     Financial resource strain: Not on file     Food insecurity:     Worry: Not on file     Inability: Not on file     Transportation needs:     Medical: Not on file     Non-medical: Not on file   Tobacco Use     Smoking status: Former Smoker     Types: Pipe     Last attempt to quit:      Years since quittin.3     Smokeless tobacco: Never Used   Substance and Sexual Activity     Alcohol use: Yes     Alcohol/week: 0.0 oz     Comment: occasional     Drug use: No     Sexual activity: Yes     Partners: Female   Lifestyle     Physical activity:     Days per week: Not on file     Minutes per session: Not on file     Stress:  Not on file   Relationships     Social connections:     Talks on phone: Not on file     Gets together: Not on file     Attends Caodaism service: Not on file     Active member of club or organization: Not on file     Attends meetings of clubs or organizations: Not on file     Relationship status: Not on file     Intimate partner violence:     Fear of current or ex partner: Not on file     Emotionally abused: Not on file     Physically abused: Not on file     Forced sexual activity: Not on file   Other Topics Concern     Parent/sibling w/ CABG, MI or angioplasty before 65F 55M? Not Asked   Social History Narrative     Not on file       Medications  Prior to Admission medications    Medication Sig Start Date End Date Taking? Authorizing Provider   acetaminophen (TYLENOL) 325 MG tablet Take 325-650 mg by mouth every 4 hours as needed for mild pain or fever Reported on 3/6/2017 10/22/16  Yes Miguelina Turner MD   aspirin (ASA) 325 MG EC tablet Take 325-650 mg by mouth every 6 hours as needed for moderate pain   Yes Unknown, Entered By History   GARLIC PO Take 1 tablet by mouth daily (OTC: Patient unsure of strength).   Yes Unknown, Entered By History   loratadine (CLARITIN) 10 MG tablet Take 10 mg by mouth daily   Yes Unknown, Entered By History   LUTEIN PO Take 1 tablet by mouth daily   Yes Unknown, Entered By History   omeprazole (PRILOSEC) 40 MG DR capsule Take 40 mg by mouth daily as needed   Yes Unknown, Entered By History   Saw Palmetto, Serenoa repens, (SAW PALMETTO PO) Take 1 capsule by mouth daily (OTC: Patient unsure of strength).   Yes Unknown, Entered By History   triamcinolone (KENALOG) 0.1 % external ointment Apply sparingly to affected area three times daily as needed. 1/22/19  Yes Adria Cowart DO   triamcinolone (NASACORT) 55 MCG/ACT nasal aerosol Spray 2 sprays into both nostrils daily 1/9/19 1/9/20 Yes Henri Trivedi DO       Allergies:  Dust mites; Penicillins; and  Zithromax [azithromycin dihydrate]    ROS: A ten point review of systems was obtained and negative other than the symptoms noted above in the HPI.     Physical Exam:   /82 (BP Location: Left arm)   Pulse 77   Temp 97.6  F (36.4  C) (Oral)   Resp 16   SpO2 99%    Constitutional: healthy, alert, no acute distress  Cardiovascular: regular rate and rhythm, no murmurs,rubs or gallops  Respiratory: clear to auscultation bilaterally  Psychiatric: normal pleasant affect  Head: atraumatic, normocephalic  Neck: supple, no thyromegaly  ENT: mucous membranes are moist, no oral lesions are noted  Abdomen: soft, non-tender, non-distended, normally active bowel sound. No rebound tenderness or guarding  Neuro: Neurologically intact grossly  Skin: warm, dry, no rashes are noted    ADDITIONAL COMMENTS:   I reviewed the patient's new clinical lab test results.   Recent Labs   Lab Test 05/01/19 0622 05/01/19  0030 04/30/19 2006 04/30/19 1736 01/22/19  1511  10/21/16  1201 10/07/15   WBC 5.4  --   --  7.5 5.8   < > 10.2  --    HGB 9.2* 8.3* 9.1* 9.8* 13.1*   < > 10.6*  --    MCV 89  --   --  92 92   < > 92  --      --   --  245 271   < > 230  --    INR  --   --   --  1.06  --   --  0.97 1.0  1.0    < > = values in this interval not displayed.      Recent Labs   Lab Test 05/01/19 0622 04/30/19 1736 01/22/19  1511    139 141   POTASSIUM 3.9 4.0 4.1   CHLORIDE 116* 110* 108   CO2 21 22 25   BUN 47* 68* 27   CR 0.91 1.09 1.02   ANIONGAP 7 7 8   DYLAN 8.3* 8.5 8.5      Recent Labs   Lab Test 04/30/19 1736 11/07/17  0747 10/12/15  0952 10/07/15 02/14/14  0752   ALBUMIN 3.1*  --  3.5  --  4.0   BILITOTAL 0.4  --  0.8  --  1.1   ALT 21  --  28 27  27 38   AST 11  --  24 26  26 34   ALKPHOS 76  --  93  --  94   PROTEIN  --  Negative  --   --   --    LIPASE  --   --  134  --   --    EGD 4/2017   Impression:    - Normal esophagus.                        - Z-line regular, at the gastroesophageal junction.                         - Duodenal erosions without bleeding.                        - Gastric ulcers with clean base. Biopsied.     Recommendation:      - Await pathology results.                        - Discharge patient to home.                        - Repeat the upper endoscopy in 8 weeks to check healing.                        - Use Prilosec (omeprazole) 40 mg PO daily.                        - If H. pylori noted would Rx appropriately.                        - Stop all ASA and NSAIDs   EGD 6/2017  Impression:          - Normal esophagus.                        - Normal stomach.                        - Normal examined duodenum.   Recommendation:      - Discharge patient to home.                        - Discontinue Carafate but continue the omeprazole 20 mg                        daily.   Colonoscopy 10/26/16   Findings:        The terminal ileum appeared normal.        Multiple diverticula were found in the sigmoid colon.        Two sessile polyps were found in the sigmoid colon and in the ascending        colon. The polyps were 3 mm in size. These polyps were removed with a        cold biopsy forceps. Resection and retrieval were complete.        The exam was otherwise without abnormality.                                                                                     Impression:          - Diverticulosis.                        - Colonic polyps as noted above s/p cold biopsy removal.                        - Fair prep.                        - Otherwise normal colonoscopy.   Recommendation:      - Await pathology results.                        - No bleeding noted at this time. Bleeding may possibly                        have been diverticular but this is speculative. If there                        is any concern for more proximal GI bleeding an EGD and                        small bowel evaluation may be warranted.    Assessment: Jarrod Lewis is a 68 year old male with a history of arthritis and previous GI  bleed due to PUD in 2017 who presents with a new onset of melena over the last 3 days in the setting of recent NSAID use over several weeks. Peptic ulcer disease is suspected. He will be scheduled for an upper endoscopy for further evaluation this afternoon.     Plan:   -NPO  -Continue Protonix gtt  -EGD this afternoon   -Will likely need daily PPI and avoidance of NSAIDS moving forward     I discussed the patient's findings and plan with Dr. Bruce who will also independently see and examine the patient.     Jahaira Ortega CNP  [facility]   Cell: [phone]  Monday through Friday [id number]  Office: [phone]

## 2019-05-01 NOTE — PROGRESS NOTES
[facility]  Hospitalist Progress Note        Raj Valdez MD   05/01/2019        Interval History:      -transfused one unit PRBC last night; no more episode of bleed today; feels better and less dizzy         Assessment and Plan:        Jarrod Lewis is a 68 year old male with history of arthritis, gastric ulcer (2017) who presented to [facility] with dark stools and fatigue, transferred to [facility] for gastroenterology evaluation for suspected upper GI bleed. Reports recent increased use of ASA for headaches/arthritis.      # Upper GI Bleed  # Hx Gastric ulcers and duodenal erosions  # Acute blood loss anemia secondary to above  -hemodynamically stable ; Hgb at baseline 13.1, 9.8 on presentation at [facility],--->  down to 8.3--->9.2  -on Protonix and octreotide drip  -received one unit PRBC   -awaiting G.I. consultation , keep NPO  - IVF @125ml/hr  -monitor hemoglobin and transfuse PRN for a hemoglobin <7     # Hx Arthritis  -Avoid NSAIDs, tylenol for pain only     DVT Prophylaxis: Pneumatic Compression Devices  Code Status: DNR, okay to intubate per discussion with patient--he does not want CPR    Expected discharge: in 1-2 days, recommended to prior living arrangement once GI workup completed                   Physical Exam:      Blood pressure 134/82, pulse 77, temperature 97.6  F (36.4  C), temperature source Oral, resp. rate 16, SpO2 99 %.  There were no vitals filed for this visit.  Vital Signs with Ranges  Temp:  [97.5  F (36.4  C)-98  F (36.7  C)] 97.6  F (36.4  C)  Pulse:  [] 77  Heart Rate:  [74-90] 77  Resp:  [16] 16  BP: ()/(50-87) 134/82  SpO2:  [97 %-99 %] 99 %  I/O's Last 24 hours  I/O last 3 completed shifts:  In: 969.75 [I.V.:766]  Out: 400 [Urine:400]    Constitutional: Alert, awake and oriented X 3; lying comfortably in bed in no apparent distress   HEENT: Pupils equal and reactive to light and accomodation, EOMI intact; neck supple no raised JVD  or rigidity    Oral cavity: Moist mucosa   Cardiovascular: Normal s1 s2, regular rate and rhythm, no murmur   Lungs: B/l clear to auscultation, no wheezes or crepitations   Abdomen: Soft, nt, nd, no guarding, rigidity or rebound; BS +   LE : No edema   Musculoskeletal: Power 5/5 in all extremities   Neuro: No focal neurological deficits noted, CN II to XII grossly intact   Psychiatry: normal mood and affect                Medications:        PRN Meds: acetaminophen, melatonin, naloxone, ondansetron **OR** ondansetron, prochlorperazine **OR** prochlorperazine **OR** prochlorperazine, senna-docusate **OR** senna-docusate         Data:      All new lab and imaging data was reviewed.   Recent Labs   Lab Test 05/01/19 0622 05/01/19  0030 04/30/19 2006 04/30/19 1736 01/22/19  1511  10/21/16  1201 10/07/15   WBC 5.4  --   --  7.5 5.8   < > 10.2  --    HGB 9.2* 8.3* 9.1* 9.8* 13.1*   < > 10.6*  --    MCV 89  --   --  92 92   < > 92  --      --   --  245 271   < > 230  --    INR  --   --   --  1.06  --   --  0.97 1.0  1.0    < > = values in this interval not displayed.      Recent Labs   Lab Test 05/01/19 0622 04/30/19 1736 01/22/19  1511    139 141   POTASSIUM 3.9 4.0 4.1   CHLORIDE 116* 110* 108   CO2 21 22 25   BUN 47* 68* 27   CR 0.91 1.09 1.02   ANIONGAP 7 7 8   DYLAN 8.3* 8.5 8.5   GLC 99 120* 102*     No lab results found.    Invalid input(s): TROP, TROPONINIES

## 2019-05-01 NOTE — ANESTHESIA PREPROCEDURE EVALUATION
Anesthesia Pre-Procedure Evaluation    Patient: Jarrod Lewis   MRN: [id number] : 1950          Preoperative Diagnosis: melena    Procedure(s):  ESOPHAGOGASTRODUODENOSCOPY (EGD)    Past Medical History:   Diagnosis Date     Arthritis      Duodenal ulcer      Gastric ulcer      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    Left cheek     Past Surgical History:   Procedure Laterality Date     C TOTAL KNEE ARTHROPLASTY Bilateral      COLONOSCOPY  2012    Procedure: COLONOSCOPY;  Colonoscopy;  Surgeon: Sylvester Lucas MD;  Location: [facility]     COLONOSCOPY N/A 10/26/2016    Procedure: COMBINED COLONOSCOPY, SINGLE OR MULTIPLE BIOPSY/POLYPECTOMY BY BIOPSY;  Surgeon: Trev Oquendo MD;  Location: [facility]     ESOPHAGOSCOPY, GASTROSCOPY, DUODENOSCOPY (EGD), COMBINED N/A 2017    Procedure: COMBINED ESOPHAGOSCOPY, GASTROSCOPY, DUODENOSCOPY (EGD), BIOPSY SINGLE OR MULTIPLE;  ESOPHAGOSCOPY, GASTROSCOPY, DUODENOSCOPY (EGD) with biopsies by forceps;  Surgeon: Puma Dominguez MD;  Location: [facility]     ESOPHAGOSCOPY, GASTROSCOPY, DUODENOSCOPY (EGD), COMBINED N/A 2017    Procedure: COMBINED ESOPHAGOSCOPY, GASTROSCOPY, DUODENOSCOPY (EGD);  ESOPHAGOSCOPY, GASTROSCOPY, DUODENOSCOPY (EGD);  Surgeon: Sylvester Lucas MD;  Location: [facility]     HERNIORRHAPHY INGUINAL Right 3/8/2017    Procedure: HERNIORRHAPHY INGUINAL;  Surgeon: Kong Lassiter MD;  Location: [facility]     JOINT REPLACEMENT, HIP RT/LT Right 2012     MOHS MICROGRAPHIC PROCEDURE Left 2017    Left cheek       Anesthesia Evaluation     . Pt has had prior anesthetic.     No history of anesthetic complications          ROS/MED HX    ENT/Pulmonary:      (-) tobacco use, asthma and sleep apnea   Neurologic:      (-) seizures and CVA   Cardiovascular:         METS/Exercise Tolerance:     Hematologic:         Musculoskeletal:         GI/Hepatic:        (-) GERD   Renal/Genitourinary:         Endo:        "  Psychiatric:         Infectious Disease:         Malignancy:         Other: Comment: uppper gi bleed                         Physical Exam  Normal systems: dental    Airway   Mallampati: II  TM distance: >3 FB  Neck ROM: full    Dental     Cardiovascular   Rhythm and rate: regular and normal      Pulmonary             Lab Results   Component Value Date    WBC 5.4 05/01/2019    HGB 8.9 (L) 05/01/2019    HCT 27.0 (L) 05/01/2019     05/01/2019     05/01/2019    POTASSIUM 3.9 05/01/2019    CHLORIDE 116 (H) 05/01/2019    CO2 21 05/01/2019    BUN 47 (H) 05/01/2019    CR 0.91 05/01/2019    GLC 99 05/01/2019    DYLAN 8.3 (L) 05/01/2019    ALBUMIN 3.1 (L) 04/30/2019    PROTTOTAL 6.3 (L) 04/30/2019    ALT 21 04/30/2019    AST 11 04/30/2019    ALKPHOS 76 04/30/2019    BILITOTAL 0.4 04/30/2019    LIPASE 134 10/12/2015    PTT 26 04/30/2019    INR 1.06 04/30/2019    TSH 1.49 10/12/2015       Preop Vitals  BP Readings from Last 3 Encounters:   05/01/19 122/77   04/30/19 128/74   04/15/19 140/82    Pulse Readings from Last 3 Encounters:   05/01/19 67   04/30/19 90   04/15/19 86      Resp Readings from Last 3 Encounters:   05/01/19 16   04/30/19 16   04/15/19 16    SpO2 Readings from Last 3 Encounters:   05/01/19 98%   04/30/19 98%   04/15/19 95%      Temp Readings from Last 1 Encounters:   05/01/19 36.4  C (97.6  F) (Oral)    Ht Readings from Last 1 Encounters:   05/01/19 1.867 m (6' 1.5\")      Wt Readings from Last 1 Encounters:   04/30/19 129 kg (284 lb 4.8 oz)    Estimated body mass index is 37 kg/m  as calculated from the following:    Height as of this encounter: 1.867 m (6' 1.5\").    Weight as of an earlier encounter on 4/30/19: 129 kg (284 lb 4.8 oz).       Anesthesia Plan      History & Physical Review  History and physical reviewed and following examination; no interval change.    ASA Status:  2 .        Plan for General and ETT with Intravenous induction. Maintenance will be Balanced.    PONV prophylaxis:  " Ondansetron (or other 5HT-3) and Dexamethasone or Solumedrol  Additional equipment: Videolaryngoscope      Postoperative Care  Postoperative pain management:  IV analgesics.      Consents  Anesthetic plan, risks, benefits and alternatives discussed with:  Patient..                 Whitney Woodward

## 2019-05-01 NOTE — PLAN OF CARE
DATE & TIME: 5/01/19 0251-8293  ORIENTATION: A&O x4  BEHAVIOR & AGGRESSION TOOL COLOR: Green  CIWA SCORE: NA  ABNL VS/O2: VSS except soft BP at times on room air  MOBILITY: SBA; dizzy at times  PAIN MANAGMENT: Denies pain  DIET: NPO except for meds  BOWEL/BLADDER: Continent; no bm overnight  ABNL LAB/BG: Hbg 9.8, 9.1, then 8.3; transfused 1 unit PRBC  DRAIN/DEVICES: PIV R infusing Octreotide @ 10 mL/hr; PIV L infusing NS @125 and Protonix gtt @10 mL/hr  TELEMETRY RHYTHM: NA  SKIN: Intact  TESTS/PROCEDURES: None  D/C DAY/GOALS/PLACE: GI consult today; Hgb checks q 6 hrs  OTHER IMPORTANT INFO:

## 2019-05-01 NOTE — PHARMACY-ADMISSION MEDICATION HISTORY
Admission medication history interview status for the 4/30/2019  admission is complete. See EPIC admission navigator for prior to admission medications     Medication history source reliability:Good    Actions taken by pharmacist (provider contacted, etc):None     Additional medication history information not noted on PTA med list :  1) Patient just started supplements this past week.     Medication reconciliation/reorder completed by provider prior to medication history? No    Time spent in this activity: 10 minutes    Prior to Admission medications    Medication Sig Last Dose Taking? Auth Provider   acetaminophen (TYLENOL) 325 MG tablet Take 325-650 mg by mouth every 4 hours as needed for mild pain or fever Reported on 3/6/2017 Past Week at Unknown time Yes Miguelina Turner MD   aspirin (ASA) 325 MG EC tablet Take 325-650 mg by mouth every 6 hours as needed for moderate pain Past Week at Unknown time Yes Unknown, Entered By History   GARLIC PO Take 1 tablet by mouth daily (OTC: Patient unsure of strength). 4/30/2019 at Unknown time Yes Unknown, Entered By History   loratadine (CLARITIN) 10 MG tablet Take 10 mg by mouth daily 4/30/2019 at Unknown time Yes Unknown, Entered By History   LUTEIN PO Take 1 tablet by mouth daily 4/30/2019 at Unknown time Yes Unknown, Entered By History   omeprazole (PRILOSEC) 40 MG DR capsule Take 40 mg by mouth daily as needed 4/30/2019 at Unknown time Yes Unknown, Entered By History   Saw Palmetto, Serenoa repens, (SAW PALMETTO PO) Take 1 capsule by mouth daily (OTC: Patient unsure of strength). 4/30/2019 at Unknown time Yes Unknown, Entered By History   triamcinolone (KENALOG) 0.1 % external ointment Apply sparingly to affected area three times daily as needed. prn med Yes Adria Cowart DO   triamcinolone (NASACORT) 55 MCG/ACT nasal aerosol Spray 2 sprays into both nostrils daily 4/30/2019 at Unknown time Yes Henri Trivedi DO

## 2019-05-02 ENCOUNTER — TELEPHONE (OUTPATIENT)
Dept: FAMILY MEDICINE | Facility: OTHER | Age: 69
End: 2019-05-02

## 2019-05-02 VITALS
SYSTOLIC BLOOD PRESSURE: 122 MMHG | DIASTOLIC BLOOD PRESSURE: 82 MMHG | BODY MASS INDEX: 37 KG/M2 | RESPIRATION RATE: 16 BRPM | OXYGEN SATURATION: 95 % | HEIGHT: 74 IN | HEART RATE: 70 BPM | TEMPERATURE: 98 F

## 2019-05-02 LAB
HGB BLD-MCNC: 8.4 G/DL (ref 13.3–17.7)
HGB BLD-MCNC: 8.5 G/DL (ref 13.3–17.7)
INTERPRETATION ECG - MUSE: NORMAL

## 2019-05-02 PROCEDURE — 99238 HOSP IP/OBS DSCHRG MGMT 30/<: CPT | Performed by: INTERNAL MEDICINE

## 2019-05-02 PROCEDURE — 85018 HEMOGLOBIN: CPT | Performed by: HOSPITALIST

## 2019-05-02 PROCEDURE — 25800030 ZZH RX IP 258 OP 636: Performed by: HOSPITALIST

## 2019-05-02 PROCEDURE — A9270 NON-COVERED ITEM OR SERVICE: HCPCS | Performed by: INTERNAL MEDICINE

## 2019-05-02 PROCEDURE — 25000132 ZZH RX MED GY IP 250 OP 250 PS 637: Performed by: HOSPITALIST

## 2019-05-02 PROCEDURE — 25000132 ZZH RX MED GY IP 250 OP 250 PS 637: Performed by: INTERNAL MEDICINE

## 2019-05-02 PROCEDURE — 36415 COLL VENOUS BLD VENIPUNCTURE: CPT | Performed by: HOSPITALIST

## 2019-05-02 PROCEDURE — 36415 COLL VENOUS BLD VENIPUNCTURE: CPT | Performed by: INTERNAL MEDICINE

## 2019-05-02 PROCEDURE — 85018 HEMOGLOBIN: CPT | Performed by: INTERNAL MEDICINE

## 2019-05-02 RX ORDER — PANTOPRAZOLE SODIUM 40 MG/1
40 TABLET, DELAYED RELEASE ORAL
Qty: 60 TABLET | Refills: 0 | Status: SHIPPED | OUTPATIENT
Start: 2019-05-02 | End: 2019-05-31

## 2019-05-02 RX ORDER — FERROUS SULFATE 325(65) MG
325 TABLET ORAL 2 TIMES DAILY
COMMUNITY
Start: 2019-05-02 | End: 2021-02-05

## 2019-05-02 RX ADMIN — PANTOPRAZOLE SODIUM 40 MG: 40 TABLET, DELAYED RELEASE ORAL at 06:29

## 2019-05-02 RX ADMIN — Medication 1 LOZENGE: at 06:27

## 2019-05-02 RX ADMIN — SODIUM CHLORIDE: 9 INJECTION, SOLUTION INTRAVENOUS at 09:17

## 2019-05-02 RX ADMIN — Medication 1 LOZENGE: at 09:35

## 2019-05-02 ASSESSMENT — ACTIVITIES OF DAILY LIVING (ADL)
ADLS_ACUITY_SCORE: 11

## 2019-05-02 NOTE — PROGRESS NOTES
"GASTROENTEROLOGY PROGRESS NOTE     SUBJECTIVE: Feeling well. Wanting to discharge. No abdominal pain, nausea, or vomiting.  Hgb 8.9-->9.5-->8.5-->8.4 over 24 hours     OBJECTIVE:   /82 (BP Location: Right arm)   Pulse 70   Temp 98  F (36.7  C) (Oral)   Resp 16   Ht 1.867 m (6' 1.5\")   SpO2 95%   BMI 37.00 kg/m     Temp (24hrs), Av.1  F (36.7  C), Min:97.4  F (36.3  C), Max:98.9  F (37.2  C)     No data found.     Intake/Output Summary (Last 24 hours) at 2019 1336  Last data filed at 2019 0927  Gross per 24 hour   Intake 3294 ml   Output 300 ml   Net 2994 ml      PHYSICAL EXAM   Constitutional: Healthy, in bed, no acute distress  Cardiovascular: Regular rate and rhythm. No murmurs rubs or gallops  Abdomen: Soft, non-tender, non-distended, normally active bowel sounds.    Additional Comments:   ROS, FH, SH: See initial GI consult for details.     I have reviewed the patient's new clinical lab results:   Recent Labs   Lab Test 19  1200 19  0742 19  1753  19  1511  10/21/16  1201 10/07/15   WBC  --   --   --   --  5.4  --  7.5 5.8   < > 10.2  --    HGB 8.4* 8.5* 9.5*   < > 9.2*   < > 9.8* 13.1*   < > 10.6*  --    MCV  --   --   --   --  89  --  92 92   < > 92  --    PLT  --   --   --   --  195  --  245 271   < > 230  --    INR  --   --   --   --   --   --  1.06  --   --  0.97 1.0  1.0    < > = values in this interval not displayed.      Recent Labs   Lab Test 19  1511    139 141   POTASSIUM 3.9 4.0 4.1   CHLORIDE 116* 110* 108   CO2 21 22 25   BUN 47* 68* 27   CR 0.91 1.09 1.02   ANIONGAP 7 7 8   DYLAN 8.3* 8.5 8.5      Recent Labs   Lab Test 19  1736 17  0747 10/12/15  0952 10/07/15 02/14/14  0752   ALBUMIN 3.1*  --  3.5  --  4.0   BILITOTAL 0.4  --  0.8  --  1.1   ALT 21  --  28 27  27 38   AST 11  --  24 26  26 34   ALKPHOS 76  --  93  --  94   PROTEIN  --  Negative  --   --   --  "   LIPASE  --   --  134  --   --    EGD 5/1/19  Findings:        The esophagus was normal.        The stomach was normal.        Three non-bleeding cratered duodenal ulcers with no stigmata of bleeding        were found in the duodenal bulb. The largest lesion was 6 mm in largest        dimension.        The second portion of the duodenum was normal.                                                                                     Impression:               - Normal esophagus.                             - Normal stomach.                             - Multiple non-bleeding duodenal ulcers with no                             stigmata of bleeding.                             - Normal second portion of the duodenum.   Recommendation:           - Use Protonix (pantoprazole) 40 mg PO BID.                             - Avoid NSAIDs or aspirin. Ok for daily low dose                             aspirin, if needed for cardiovascular indications.                             - After 2 months of twice a day pantoprazole or                             PPI, he should remain on a daily PPI (pantoprazole,                             omeprazole, etc) indefinitely.                             - Check H. pylori, treat if positive.     Assessment:  Melena in setting of NSAID use, found to have duodenal ulcers. Hgb stable since yesterday. No further melena       Plan:    -OK to discharge from GI standpoint  -Protonix 40mg BID for 2 months, daily thereafter indefinitely   -Does not need repeat EGD in 2 months for duodenal ulcers   -Recommend H. Pylori testing through PCP since that was not tested prior to discharge     Jahaira Ortega CNP  [place] [facility]   Cell: [phone]  Monday through Friday [id number]  Office: [phone]

## 2019-05-02 NOTE — PROGRESS NOTES
A&Ox4. Denies pain. VSS on RA. Ind in room. Tolerating reg diet. BS audible and active in al quadrants. No BM. Showered. Occasional dry cough post EGD. Transferred to room 321.

## 2019-05-02 NOTE — TELEPHONE ENCOUNTER
Patient is being discharged today. Will postpone message to tomorrow and follow up with patient then.     AARON Jacobson, RN  [facility]

## 2019-05-02 NOTE — PLAN OF CARE
VSS, A&O, Lung sounds clear. Bowel sounds active, passing gas. Stool- no blood. Ambulates the hallways independently. Denies pain. Discussed patients discharge instructions with patient and given him his discharge medications. Patient will be discharge to home with his g/f. All belongings from security returned back to patient.

## 2019-05-02 NOTE — TELEPHONE ENCOUNTER
Patient called to schedule an appointment for a hospital follow-up or appeared on a report showing that they were recently discharged from the hospital.    [facility]  Discharged date: 5/2/19  Reason for hospital admission:  GI bleeding   Does patient have future appointment scheduled with provider? Yes   Date of future appointment:  5/7/19      This information will be used to help the care team plan for the patients upcoming visit.  The triage RN may determine that a follow up call is necessary and reach out to the patient via the phone number listed in the chart.     Please route this message on routine priority to the Triage RN pool.

## 2019-05-03 NOTE — TELEPHONE ENCOUNTER
"Hospital/TCU/ED for chronic condition Discharge Protocol    \"Hi, my name is Nichole Perry, a registered nurse, and I am calling from [facility] (formerly [facility]).  I am calling to follow up and see how things are going for you after your recent emergency visit/hospital/TCU stay.\"    Tell me how you are doing now that you are home?\" Doing good!\" Throat sore from scope.\"      Discharge Instructions    \"Let's review your discharge instructions.  What is/are the follow-up recommendations?  Pt. Response: Follow up with PCP    \"Has an appointment with your primary care provider been scheduled?\"   Yes. (confirm)    \"When you see the provider, I would recommend that you bring your medications with you.\"    Medications    \"Tell me what changed about your medicines when you discharged?\"    Changes to chronic meds?    0-1    \"What questions do you have about your medications?\"    None     New diagnoses of heart failure, COPD, diabetes, or MI?    No              Medication reconciliation completed? Yes  Was MTM referral placed (*Make sure to put transitions as reason for referral)?   No    Call Summary    \"What questions or concerns do you have about your recent visit and your follow-up care?\"     none    \"If you have questions or things don't continue to improve, we encourage you contact us through the main clinic number (give number).  Even if the clinic is not open, triage nurses are available 24/7 to help you.     We would like you to know that our clinic has extended hours (provide information).  We also have urgent care (provide details on closest location and hours/contact info)\"      \"Thank you for your time and take care!\"    AARON Jacobson, RN  [facility]  "

## 2019-05-07 ENCOUNTER — OFFICE VISIT (OUTPATIENT)
Dept: FAMILY MEDICINE | Facility: OTHER | Age: 69
End: 2019-05-07
Payer: COMMERCIAL

## 2019-05-07 VITALS
OXYGEN SATURATION: 100 % | TEMPERATURE: 97.6 F | HEART RATE: 88 BPM | SYSTOLIC BLOOD PRESSURE: 116 MMHG | WEIGHT: 287 LBS | BODY MASS INDEX: 37.35 KG/M2 | DIASTOLIC BLOOD PRESSURE: 76 MMHG | RESPIRATION RATE: 16 BRPM

## 2019-05-07 DIAGNOSIS — K26.4 GASTROINTESTINAL HEMORRHAGE ASSOCIATED WITH DUODENAL ULCER: Primary | ICD-10-CM

## 2019-05-07 DIAGNOSIS — J30.1 SEASONAL ALLERGIC RHINITIS DUE TO POLLEN: ICD-10-CM

## 2019-05-07 LAB — HGB BLD-MCNC: 9.4 G/DL (ref 13.3–17.7)

## 2019-05-07 PROCEDURE — 85018 HEMOGLOBIN: CPT | Performed by: INTERNAL MEDICINE

## 2019-05-07 PROCEDURE — 36415 COLL VENOUS BLD VENIPUNCTURE: CPT | Performed by: INTERNAL MEDICINE

## 2019-05-07 PROCEDURE — 99495 TRANSJ CARE MGMT MOD F2F 14D: CPT | Performed by: INTERNAL MEDICINE

## 2019-05-07 ASSESSMENT — PAIN SCALES - GENERAL: PAINLEVEL: NO PAIN (0)

## 2019-05-07 NOTE — PROGRESS NOTES
SUBJECTIVE:   Jarrod Lewis is a 68 year old male who presents to clinic today for the following   health issues:  Patient was recently hospitalized with an acute gastrointestinal bleed found to be the result of duodenal ulcers which were the result of ibuprofen usage.      Hospital Follow-up Visit:    Hospital/Nursing Home/IP Rehab Facility: [facility]  Date of Admission: 4/30/2019  Date of Discharge: 5/02/2019  Reason(s) for Admission: GI Bleed            Problems taking medications regularly:  None       Medication changes since discharge: started Iron and stopped aspirin and omeprazole       Problems adhering to non-medication therapy:  None    Summary of hospitalization:  [facility] discharge summary reviewed  Diagnostic Tests/Treatments reviewed.  Follow up needed: none  Other Healthcare Providers Involved in Patient s Care:         None  Update since discharge: improved.     Post Discharge Medication Reconciliation: discharge medications reconciled, continue medications without change.  Plan of care communicated with patient     Coding guidelines for this visit:  Type of Medical   Decision Making Face-to-Face Visit       within 7 Days of discharge Face-to-Face Visit        within 14 days of discharge   Moderate Complexity [id number]   High Complexity [id number]                  Additional history: as documented    Reviewed  and updated as needed this visit by clinical staff  Tobacco  Allergies  Meds  Med Hx  Surg Hx  Fam Hx  Soc Hx        Reviewed and updated as needed this visit by Provider         Patient Active Problem List   Diagnosis     CARDIOVASCULAR SCREENING; LDL GOAL LESS THAN 130     Reducible right inguinal hernia     GI bleed     Advanced directives, counseling/discussion     Allergic rhinitis due to dust mite     Sinus pressure     Seasonal allergic rhinitis due to pollen     Allergic rhinitis due to mold     Penicillin allergy     Upper GI bleed     Past  Surgical History:   Procedure Laterality Date     C TOTAL KNEE ARTHROPLASTY Bilateral      COLONOSCOPY  2012    Procedure: COLONOSCOPY;  Colonoscopy;  Surgeon: Sylvester Lucas MD;  Location:  GI     COLONOSCOPY N/A 10/26/2016    Procedure: COMBINED COLONOSCOPY, SINGLE OR MULTIPLE BIOPSY/POLYPECTOMY BY BIOPSY;  Surgeon: Trev Oquendo MD;  Location:  GI     ESOPHAGOSCOPY, GASTROSCOPY, DUODENOSCOPY (EGD), COMBINED N/A 2017    Procedure: COMBINED ESOPHAGOSCOPY, GASTROSCOPY, DUODENOSCOPY (EGD), BIOPSY SINGLE OR MULTIPLE;  ESOPHAGOSCOPY, GASTROSCOPY, DUODENOSCOPY (EGD) with biopsies by forceps;  Surgeon: Puma Dominguez MD;  Location:  GI     ESOPHAGOSCOPY, GASTROSCOPY, DUODENOSCOPY (EGD), COMBINED N/A 2017    Procedure: COMBINED ESOPHAGOSCOPY, GASTROSCOPY, DUODENOSCOPY (EGD);  ESOPHAGOSCOPY, GASTROSCOPY, DUODENOSCOPY (EGD);  Surgeon: Sylvester Lucas MD;  Location:  GI     ESOPHAGOSCOPY, GASTROSCOPY, DUODENOSCOPY (EGD), COMBINED N/A 2019    Procedure: ESOPHAGOGASTRODUODENOSCOPY (EGD);  Surgeon: Artur Bruce MD;  Location:  GI     HERNIORRHAPHY INGUINAL Right 3/8/2017    Procedure: HERNIORRHAPHY INGUINAL;  Surgeon: Kong Lassiter MD;  Location: [facility] OR     JOINT REPLACEMENT, HIP RT/LT Right 2012     MOHS MICROGRAPHIC PROCEDURE Left 2017    Left cheek       Social History     Tobacco Use     Smoking status: Former Smoker     Types: Pipe     Last attempt to quit:      Years since quittin.3     Smokeless tobacco: Never Used   Substance Use Topics     Alcohol use: Yes     Alcohol/week: 0.0 oz     Comment: occasional     Family History   Problem Relation Age of Onset     Stomach Cancer Father          at age 46         Current Outpatient Medications   Medication Sig Dispense Refill     acetaminophen (TYLENOL) 325 MG tablet Take 325-650 mg by mouth every 4 hours as needed for mild pain or fever Reported on 3/6/2017 100 tablet      ferrous sulfate (FEROSUL)  325 (65 Fe) MG tablet Take 1 tablet (325 mg) by mouth 2 times daily       GARLIC PO Take 1 tablet by mouth daily (OTC: Patient unsure of strength).       loratadine (CLARITIN) 10 MG tablet Take 10 mg by mouth daily       LUTEIN PO Take 1 tablet by mouth daily       pantoprazole (PROTONIX) 40 MG EC tablet Take 1 tablet (40 mg) by mouth 2 times daily (before meals) for 2 months, then continue Protonix 40 mg daily indefinitely. 60 tablet 0     Saw Palmetto, Serenoa repens, (SAW PALMETTO PO) Take 1 capsule by mouth daily (OTC: Patient unsure of strength).       triamcinolone (KENALOG) 0.1 % external ointment Apply sparingly to affected area three times daily as needed. 15 g 1     triamcinolone (NASACORT) 55 MCG/ACT nasal aerosol Spray 2 sprays into both nostrils daily 1 Bottle 3     Allergies   Allergen Reactions     Dust Mites      Penicillins Unknown     As a child       Zithromax [Azithromycin Dihydrate]      diarrhea     Recent Labs   Lab Test 05/01/19  0622 04/30/19  1736 01/22/19  1511 11/07/17  0732  10/12/15  0952  10/07/15 02/14/14  0752   LDL  --   --  76 60  --  82   < >  --  72   HDL  --   --  53 50  --  45   < >  --  46   TRIG  --   --  81 53  --  60   < >  --  61   ALT  --  21  --   --   --  28  --  27  27 38   CR 0.91 1.09 1.02 0.96   < > 0.86   < > 0.89 1.00   GFRESTIMATED 86 69 75 78   < > 89  --   --  76   GFRESTBLACK >90 80 87 >90   < > >90  African American GFR Calc    --   --  >90   POTASSIUM 3.9 4.0 4.1 4.1   < > 4.4   < > 4.0 4.6   TSH  --   --   --   --   --  1.49  --   --  1.89    < > = values in this interval not displayed.      BP Readings from Last 3 Encounters:   05/07/19 116/76   05/02/19 122/82   04/30/19 128/74    Wt Readings from Last 3 Encounters:   05/07/19 130.2 kg (287 lb)   04/30/19 129 kg (284 lb 4.8 oz)   04/15/19 129.2 kg (284 lb 12.8 oz)                    ROS:  CONSTITUTIONAL: NEGATIVE for fever, chills, change in weight  INTEGUMENTARY/SKIN: NEGATIVE for worrisome rashes,  moles or lesions  EYES: NEGATIVE for vision changes or irritation  ENT/MOUTH: NEGATIVE for ear, mouth and throat problems  RESP: NEGATIVE for significant cough or SOB  BREAST: NEGATIVE for masses, tenderness or discharge  CV: NEGATIVE for chest pain, palpitations or peripheral edema  GI: NEGATIVE for nausea, abdominal pain, heartburn, or change in bowel habits  : NEGATIVE for frequency, dysuria, or hematuria  MUSCULOSKELETAL: NEGATIVE for significant arthralgias or myalgia  NEURO: NEGATIVE for weakness, dizziness or paresthesias  ENDOCRINE: NEGATIVE for temperature intolerance, skin/hair changes  HEME: NEGATIVE for bleeding problems  PSYCHIATRIC: NEGATIVE for changes in mood or affect    OBJECTIVE:     /76   Pulse 88   Temp 97.6  F (36.4  C) (Temporal)   Resp 16   Wt 130.2 kg (287 lb)   SpO2 100%   BMI 37.35 kg/m    Body mass index is 37.35 kg/m .  GENERAL: healthy, alert and no distress  EYES: Eyes grossly normal to inspection, PERRL and conjunctivae and sclerae normal  HENT: ear canals and TM's normal, nose and mouth without ulcers or lesions.  Moderate congestion secondary to nasal edema has seasonal allergies.    NECK: no adenopathy, no asymmetry, masses, or scars and thyroid normal to palpation  RESP: lungs clear to auscultation - no rales, rhonchi or wheezes  CV: regular rate and rhythm, normal S1 S2, no S3 or S4, no murmur, click or rub, no peripheral edema and peripheral pulses strong  ABDOMEN: soft, nontender, no hepatosplenomegaly, no masses and bowel sounds normal  MS: no gross musculoskeletal defects noted, no edema  SKIN: no suspicious lesions or rashes  NEURO: Normal strength and tone, mentation intact and speech normal  PSYCH: mentation appears normal, affect normal/bright    Diagnostic Test Results:  Hgb -9.4    ASSESSMENT/PLAN:           ICD-10-CM    1. GI bleed K92.2 Hemoglobin   2. Seasonal allergic rhinitis due to pollen J30.1                                 FOLLOW UP   I have asked  the patient to make an appointment for followup with me in 2 weeks    Adria Cowart [facility]

## 2019-05-08 NOTE — RESULT ENCOUNTER NOTE
Dear Jarrod, your recent test results are attached.  The hemoglobin is 9.4 which is up from the previous 8.4 only 5 days ago.    Feel free to contact me via the office or My Chart if you have any questions regarding the above.  Sincerely,  Adria Cowart, DO PERALESOI

## 2019-05-24 DIAGNOSIS — K26.4 GASTROINTESTINAL HEMORRHAGE ASSOCIATED WITH DUODENAL ULCER: Primary | ICD-10-CM

## 2019-05-24 DIAGNOSIS — K26.4 GASTROINTESTINAL HEMORRHAGE ASSOCIATED WITH DUODENAL ULCER: ICD-10-CM

## 2019-05-24 LAB
ERYTHROCYTE [DISTWIDTH] IN BLOOD BY AUTOMATED COUNT: 13 % (ref 10–15)
HCT VFR BLD AUTO: 34.7 % (ref 40–53)
HGB BLD-MCNC: 11.2 G/DL (ref 13.3–17.7)
MCH RBC QN AUTO: 30 PG (ref 26.5–33)
MCHC RBC AUTO-ENTMCNC: 32.3 G/DL (ref 31.5–36.5)
MCV RBC AUTO: 93 FL (ref 78–100)
PLATELET # BLD AUTO: 257 10E9/L (ref 150–450)
RBC # BLD AUTO: 3.73 10E12/L (ref 4.4–5.9)
WBC # BLD AUTO: 5.8 10E9/L (ref 4–11)

## 2019-05-24 PROCEDURE — 85027 COMPLETE CBC AUTOMATED: CPT | Performed by: INTERNAL MEDICINE

## 2019-05-24 PROCEDURE — 36415 COLL VENOUS BLD VENIPUNCTURE: CPT | Performed by: INTERNAL MEDICINE

## 2019-05-30 NOTE — RESULT ENCOUNTER NOTE
Dear Jarrod, your recent test results are attached.  The blood cell count shows a mild anemia at 11.2 however this is markedly improved over recent values.    Feel free to contact me via the office or My Chart if you have any questions regarding the above.  Sincerely,  DO TIMMY Major

## 2019-05-31 ENCOUNTER — MYC MEDICAL ADVICE (OUTPATIENT)
Dept: FAMILY MEDICINE | Facility: OTHER | Age: 69
End: 2019-05-31

## 2019-05-31 DIAGNOSIS — K92.2 UPPER GI BLEED: ICD-10-CM

## 2019-05-31 RX ORDER — PANTOPRAZOLE SODIUM 40 MG/1
40 TABLET, DELAYED RELEASE ORAL
Qty: 60 TABLET | Refills: 0 | Status: SHIPPED | OUTPATIENT
Start: 2019-05-31 | End: 2019-07-11

## 2019-06-04 ENCOUNTER — TELEPHONE (OUTPATIENT)
Dept: FAMILY MEDICINE | Facility: OTHER | Age: 69
End: 2019-06-04

## 2019-06-04 NOTE — TELEPHONE ENCOUNTER
PA initiated by pharmacy via Cover my Meds  KEY: [id number]    Prior Authorization Retail Medication Request    Medication/Dose: pantoprazole (PROTONIX) 40 MG EC tablet  ICD code (if different than what is on RX):  Upper GI bleed [K92.2]   Previously Tried and Failed:  omeprazole (PRILOSEC) 40 MG capsule   Rationale:  na    Insurance Name:  [employer] - [employer] [facility] (Local[employer] Care);  MEDICARE - MEDICARE FOR HB SUPPLEMENT (Medicare)  Insurance ID:  HP  - [id number]  ; Medicare   - [id number]      Pharmacy Information (if different than what is on RX)  Name: [facility]  Phone:  [phone]

## 2019-06-05 NOTE — TELEPHONE ENCOUNTER
PA Initiation    Medication: pantoprazole (PROTONIX) 40 MG EC tablet -   Insurance Company: [employer] - Phone [phone] Fax [phone]  Pharmacy Filling the Rx: [id number] - [place], [place] - [address]  Filling Pharmacy Phone: [phone]  Filling Pharmacy Fax: [phone]  Start Date: 6/5/2019

## 2019-06-11 NOTE — TELEPHONE ENCOUNTER
PRIOR AUTHORIZATION DENIED    Medication: pantoprazole (PROTONIX) 40 MG EC tablet - DENIED    Denial Date: 6/6/2019    Denial Rational:         Appeal Information:

## 2019-06-18 NOTE — TELEPHONE ENCOUNTER
Please let the patient know that Protonix is not going to be allowed by his insurance company.    Supa

## 2019-07-11 DIAGNOSIS — K92.2 UPPER GI BLEED: ICD-10-CM

## 2019-07-12 RX ORDER — PANTOPRAZOLE SODIUM 40 MG/1
TABLET, DELAYED RELEASE ORAL
Qty: 60 TABLET | Refills: 5 | Status: SHIPPED | OUTPATIENT
Start: 2019-07-12 | End: 2019-09-13

## 2019-07-12 NOTE — TELEPHONE ENCOUNTER
"Protonix  Last Written Prescription Date:  05/31/2019  Last Fill Quantity: 60,  # refills: 0   Last office visit: 5/7/2019 with prescribing provider:  Supa   Future Office Visit:  None  Prescription approved per [facility] Refill Protocol.    Requested Prescriptions   Pending Prescriptions Disp Refills     pantoprazole (PROTONIX) 40 MG EC tablet [Pharmacy Med Name: PANTOPRAZOLE SODIUM 40MG TBEC] 60 tablet 0     Sig: TAKE ONE TABLET BY MOUTH TWICE A DAY BEFORE MEALS FOR 2 MONTHS, THEN CONTINUE PROTONIX 40 MG DAILY INDEFINITELY       PPI Protocol Passed - 7/11/2019  9:32 AM        Passed - Not on Clopidogrel (unless Pantoprazole ordered)        Passed - No diagnosis of osteoporosis on record        Passed - Recent (12 mo) or future (30 days) visit within the authorizing provider's specialty     Patient had office visit in the last 12 months or has a visit in the next 30 days with authorizing provider or within the authorizing provider's specialty.  See \"Patient Info\" tab in inbasket, or \"Choose Columns\" in Meds & Orders section of the refill encounter.          Passed - Medication is active on med list        Passed - Patient is age 18 or older      Ami Adrian RN   "

## 2019-09-13 ENCOUNTER — OFFICE VISIT (OUTPATIENT)
Dept: FAMILY MEDICINE | Facility: OTHER | Age: 69
End: 2019-09-13
Payer: COMMERCIAL

## 2019-09-13 VITALS
BODY MASS INDEX: 37.24 KG/M2 | HEIGHT: 73 IN | WEIGHT: 281 LBS | HEART RATE: 86 BPM | DIASTOLIC BLOOD PRESSURE: 80 MMHG | OXYGEN SATURATION: 96 % | RESPIRATION RATE: 20 BRPM | SYSTOLIC BLOOD PRESSURE: 138 MMHG | TEMPERATURE: 98.5 F

## 2019-09-13 DIAGNOSIS — Z23 NEED FOR VACCINATION: ICD-10-CM

## 2019-09-13 DIAGNOSIS — J06.9 VIRAL URI WITH COUGH: Primary | ICD-10-CM

## 2019-09-13 PROCEDURE — 90732 PPSV23 VACC 2 YRS+ SUBQ/IM: CPT | Performed by: NURSE PRACTITIONER

## 2019-09-13 PROCEDURE — G0009 ADMIN PNEUMOCOCCAL VACCINE: HCPCS | Performed by: NURSE PRACTITIONER

## 2019-09-13 PROCEDURE — 99213 OFFICE O/P EST LOW 20 MIN: CPT | Mod: 25 | Performed by: NURSE PRACTITIONER

## 2019-09-13 ASSESSMENT — MIFFLIN-ST. JEOR: SCORE: 2090.55

## 2019-09-13 ASSESSMENT — PAIN SCALES - GENERAL: PAINLEVEL: NO PAIN (0)

## 2019-09-13 NOTE — NURSING NOTE
Clinic Administered Medication Documentation    MEDICATION LIST:   Injectable Medication Documentation    Patient was given pneumovax-23. Prior to medication administration, verified patients identity using patient s name and date of birth. Please see MAR and medication order for additional information. Patient instructed to remain in clinic for 15 minutes and report any adverse reaction to staff immediately .      Was entire vial of medication used? Yes  Vial/Syringe: Syringe  Expiration Date:  8/12/2020  Was this medication supplied by the patient? No     ................Alfredo Denton LPN,   September 13, 2019,      2:47 PM,   [facility]

## 2019-09-13 NOTE — PATIENT INSTRUCTIONS
Patient Education        This should improve within 1-2 weeks.  Let us know if it doesn't.     Viral Upper Respiratory Illness (Adult)    You have a viral upper respiratory illness (URI), which is another term for the common cold. This illness is contagious during the first few days. It is spread through the air by coughing and sneezing. It may also be spread by direct contact (touching the sick person and then touching your own eyes, nose, or mouth). Frequent handwashing will decrease risk of spread. Most viral illnesses go away within 7 to 10 days with rest and simple home remedies. Sometimes the illness may last for several weeks. Antibiotics will not kill a virus, and they are generally not prescribed for this condition.  Home care    If symptoms are severe, rest at home for the first 2 to 3 days. When you resume activity, don't let yourself get too tired.    Don't smoke. If you need help stopping, talk with your healthcare provider.    Avoid being exposed to cigarette smoke (yours or others ).    You may use acetaminophen or ibuprofen to control pain and fever, unless another medicine was prescribed. If you have chronic liver or kidney disease, have ever had a stomach ulcer or gastrointestinal bleeding, or are taking blood-thinning medicines, talk with your healthcare provider before using these medicines. Aspirin should never be given to anyone under 18 years of age who is ill with a viral infection or fever. It may cause severe liver or brain damage.    Your appetite may be poor, so a light diet is fine. Stay well hydrated by drinking 6 to 8 glasses of fluids per day (water, soft drinks, juices, tea, or soup). Extra fluids will help loosen secretions in the nose and lungs.    Over-the-counter cold medicines will not shorten the length of time you re sick, but they may be helpful for the following symptoms: cough, sore throat, and nasal and sinus congestion. If you take prescription medicines, ask your  healthcare provider or pharmacist which over-the-counter medicines are safe to use. (Note: Don't use decongestants if you have high blood pressure.)  Follow-up care  Follow up with your healthcare provider, or as advised.  When to seek medical advice  Call your healthcare provider right away if any of these occur:    Cough with lots of colored sputum (mucus)    Severe headache; face, neck, or ear pain    Difficulty swallowing due to throat pain    Fever of 100.4 F (38 C) or higher, or as directed by your healthcare provider  Call 911  Call 911 if any of these occur:    Chest pain, shortness of breath, wheezing, or difficulty breathing    Coughing up blood    Very severe pain with swallowing, especially if it goes along with a muffled voice   Date Last Reviewed: 6/1/2018 2000-2018 [employer]. [address] [place] [address], [place] [address]. All rights reserved. This information is not intended as a substitute for professional medical care. Always follow your healthcare professional's instructions.

## 2019-09-13 NOTE — PROGRESS NOTES
"Subjective     Jarrod Lewis is a 69 year old male who presents to clinic today for the following health issues:    HPI   RESPIRATORY SYMPTOMS      Duration: 5 days    Description  nasal congestion, rhinorrhea and cough    Severity: mild    Accompanying signs and symptoms: sneezing, going to chest    History (predisposing factors):  none    Precipitating or alleviating factors: None    Therapies tried and outcome:  none    Is using Tylenol and that is not helpful.  Is on Claritin for allergies.           Review of Systems   ROS COMP: Constitutional, HEENT, cardiovascular, pulmonary, gi and gu systems are negative, except as otherwise noted.      Objective    BP (!) 150/80   Pulse 86   Temp 98.5  F (36.9  C) (Temporal)   Resp 20   Ht 1.842 m (6' 0.5\")   Wt 127.5 kg (281 lb)   SpO2 96%   BMI 37.59 kg/m    Body mass index is 37.59 kg/m .  Physical Exam   GENERAL: alert, no distress and obese  HENT: normal cephalic/atraumatic, ear canals and TM's normal, nose and mouth without ulcers or lesions, oral mucous membranes moist, tonsillar erythema and sinuses: not tender  NECK: no adenopathy  RESP: lungs clear to auscultation - no rales, rhonchi or wheezes  CV: regular rate and rhythm, normal S1 S2, no S3 or S4, no murmur, click or rub  ABDOMEN: soft, nontender, no hepatosplenomegaly, no masses and bowel sounds normal  MS: no gross musculoskeletal defects noted, no edema    Diagnostic Test Results:  Labs reviewed in Epic        Assessment & Plan     1. Viral URI with cough  Advised on symptomatic treatments including Tylenol, Ibuprofen, increasing fluids and rest.     Recheck BP in 2 weeks.  It was a little high today.       BMI:   Estimated body mass index is 37.59 kg/m  as calculated from the following:    Height as of this encounter: 1.842 m (6' 0.5\").    Weight as of this encounter: 127.5 kg (281 lb).   Weight management plan: Patient was referred to their PCP to discuss a diet and exercise plan.        See " Patient Instructions    Return in about 3 months (around 12/13/2019) for Physical.    JO Pastor [facility]

## 2019-11-18 ENCOUNTER — TELEPHONE (OUTPATIENT)
Dept: FAMILY MEDICINE | Facility: OTHER | Age: 69
End: 2019-11-18

## 2019-11-18 ENCOUNTER — ANCILLARY PROCEDURE (OUTPATIENT)
Dept: GENERAL RADIOLOGY | Facility: OTHER | Age: 69
End: 2019-11-18
Attending: INTERNAL MEDICINE
Payer: COMMERCIAL

## 2019-11-18 ENCOUNTER — OFFICE VISIT (OUTPATIENT)
Dept: FAMILY MEDICINE | Facility: OTHER | Age: 69
End: 2019-11-18
Payer: COMMERCIAL

## 2019-11-18 VITALS
DIASTOLIC BLOOD PRESSURE: 82 MMHG | WEIGHT: 279 LBS | SYSTOLIC BLOOD PRESSURE: 138 MMHG | HEART RATE: 89 BPM | RESPIRATION RATE: 18 BRPM | BODY MASS INDEX: 37.32 KG/M2 | TEMPERATURE: 98 F | OXYGEN SATURATION: 98 %

## 2019-11-18 DIAGNOSIS — M79.644 PAIN OF FINGER OF RIGHT HAND: Primary | ICD-10-CM

## 2019-11-18 DIAGNOSIS — S62.656A CLOSED NONDISPLACED FRACTURE OF MIDDLE PHALANX OF RIGHT LITTLE FINGER, INITIAL ENCOUNTER: ICD-10-CM

## 2019-11-18 DIAGNOSIS — M79.644 PAIN OF FINGER OF RIGHT HAND: ICD-10-CM

## 2019-11-18 PROCEDURE — 99213 OFFICE O/P EST LOW 20 MIN: CPT | Performed by: INTERNAL MEDICINE

## 2019-11-18 PROCEDURE — 73140 X-RAY EXAM OF FINGER(S): CPT | Mod: RT

## 2019-11-18 ASSESSMENT — PAIN SCALES - GENERAL: PAINLEVEL: MODERATE PAIN (4)

## 2019-11-18 NOTE — TELEPHONE ENCOUNTER
Okay per CM to see patient in 11 Dr. Only appointment. Patient informed and appointment was made.     Stephanie Montes MA

## 2019-11-18 NOTE — TELEPHONE ENCOUNTER
Reason for Call:  Same Day Appointment, Requested Provider:  Dr. Cowart     PCP: Adria Cowart    Reason for visit: R little finger injury- possibly broken     Duration of symptoms: happened Saturday night     Have you been treated for this in the past? No    Additional comments: Pt would like to be seen today     Can we leave a detailed message on this number? YES    Phone number patient can be reached at: Home number on file [phone] (home)    Best Time: any     Call taken on 11/18/2019 at 7:27 AM by Suzanna Manzo

## 2019-11-18 NOTE — PROGRESS NOTES
"Subjective     Jarrod Lewis is a 69 year old male who presents to clinic today for the following health issues:    HPI   Possible R pinky break. Patient was using a  and the cord got wrapped around his R pinky finger and got slammed against something. Patient has 2 lacerations on the finger and thinks its possibly broken.       Duration: Happened Sat     Description (location/character/radiation): Right pinky pain, with 2 lacerations    Intensity:  moderate    Accompanying signs and symptoms: Laceration or possible break    History (similar episodes/previous evaluation): None    Precipitating or alleviating factors: None    Therapies tried and outcome: None                   Chief Complaint         The patient is a pleasant 69-year-old gentleman who was recently using a lactic buffer.  It snagged, it spun, his left little finger got caught in the cord, and it went also.  He has a couple small lacerations on the finger but is concerned that he may have fractured it.  There is no gross angulation or deformity noted at this time.  He states it feels as if the bone is \"loose\".                       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     Surgical History:   has a past surgical history that includes Colonoscopy (12/14/2012); Colonoscopy (N/A, 10/26/2016); Mohs micrographic procedure (Left, 01/02/2017); joint replacement, hip rt/lt (Right, 02/03/2012); TOTAL KNEE ARTHROPLASTY (Bilateral); Herniorrhaphy inguinal (Right, 3/8/2017); Esophagoscopy, gastroscopy, duodenoscopy (EGD), combined (N/A, 4/24/2017); Esophagoscopy, gastroscopy, duodenoscopy (EGD), combined (N/A, 6/12/2017); and Esophagoscopy, gastroscopy, duodenoscopy (EGD), combined (N/A, 5/1/2019).     Social History:   reports that he quit smoking about 7 years ago. His smoking use " included pipe. He has never used smokeless tobacco. He reports current alcohol use. He reports that he does not use drugs.     Family History:  family history includes Stomach Cancer in his father.            MEDICATIONS  Current Outpatient Medications   Medication Sig Dispense Refill     acetaminophen (TYLENOL) 325 MG tablet Take 325-650 mg by mouth every 4 hours as needed for mild pain or fever Reported on 3/6/2017 100 tablet      ferrous sulfate (FEROSUL) 325 (65 Fe) MG tablet Take 1 tablet (325 mg) by mouth 2 times daily       loratadine (CLARITIN) 10 MG tablet Take 10 mg by mouth daily       triamcinolone (KENALOG) 0.1 % external ointment Apply sparingly to affected area three times daily as needed. 15 g 1     triamcinolone (NASACORT) 55 MCG/ACT nasal aerosol Spray 2 sprays into both nostrils daily (Patient not taking: Reported on 11/18/2019) 1 Bottle 3         --------------------------------------------------------------------------------------------------------------------                                     Examination      /82 (BP Location: Left arm, Patient Position: Sitting, Cuff Size: Adult Large)   Pulse 89   Temp 98  F (36.7  C) (Temporal)   Resp 18   Wt 126.6 kg (279 lb)   SpO2 98%   BMI 37.32 kg/m     LUNGS: clear bilaterally, airflow is brisk, no intercostal retraction or stridor is noted. No coughing is noted during visit.   HEART:  regular without rubs, clicks, gallops, or murmurs. PMI is nondisplaced. Upstrokes are brisk. S1,S2 are heard.   MS: Minimal crepitance is noted in the hand and fingers. No deformity is present. Muscle strength is appropriate and equal bilaterally. No acute joint erythema or swelling is present.  X-ray confirms a spiral fracture of the second carpal in the fifth finger of the right hand.                                             Decision Making  1. Pain of finger of right hand  Fracture noted  - XR Finger Right G/E 2 Views; Future    2. Closed nondisplaced  fracture of middle phalanx of right little finger, initial encounter  As above  Flexible aluminum splint is applied.  Patient instructed to try to maintain it for at least a week before its inevitable removal                             FOLLOW UP   I have asked the patient to make an appointment for followup with me as needed.  Discussed potential for infection etc., patient will be alert        I have carefully explained the diagnosis and treatment options to the patient.  The patient has displayed an understanding of the above, and all subsequent questions were answered.      DO TIMMY Major    Portions of this note were produced using [employer]  Although every attempt at real-time proof reading has been made, occasional grammar/syntax errors may have been missed.

## 2019-11-19 NOTE — RESULT ENCOUNTER NOTE
Dear Jarrod, your recent test results are attached.  Finger fractures as discussed in the office.    Feel free to contact me via the office or My Chart if you have any questions regarding the above.  Sincerely,  Adria Cowart DO FACOI

## 2019-12-30 ENCOUNTER — OFFICE VISIT (OUTPATIENT)
Dept: FAMILY MEDICINE | Facility: OTHER | Age: 69
End: 2019-12-30
Payer: COMMERCIAL

## 2019-12-30 ENCOUNTER — TELEPHONE (OUTPATIENT)
Dept: FAMILY MEDICINE | Facility: OTHER | Age: 69
End: 2019-12-30

## 2019-12-30 VITALS
DIASTOLIC BLOOD PRESSURE: 82 MMHG | OXYGEN SATURATION: 95 % | RESPIRATION RATE: 14 BRPM | BODY MASS INDEX: 37.34 KG/M2 | HEART RATE: 92 BPM | SYSTOLIC BLOOD PRESSURE: 120 MMHG | WEIGHT: 279.13 LBS | TEMPERATURE: 98.6 F

## 2019-12-30 DIAGNOSIS — J01.90 ACUTE SINUSITIS WITH SYMPTOMS > 10 DAYS: Primary | ICD-10-CM

## 2019-12-30 PROCEDURE — 99203 OFFICE O/P NEW LOW 30 MIN: CPT | Performed by: FAMILY MEDICINE

## 2019-12-30 RX ORDER — DOXYCYCLINE 100 MG/1
100 CAPSULE ORAL 2 TIMES DAILY
Qty: 20 CAPSULE | Refills: 0 | Status: SHIPPED | OUTPATIENT
Start: 2019-12-30 | End: 2020-02-03

## 2019-12-30 ASSESSMENT — PAIN SCALES - GENERAL: PAINLEVEL: NO PAIN (0)

## 2019-12-30 NOTE — TELEPHONE ENCOUNTER
Reason for Call:  Same Day Appointment, Requested Provider:  [facility] in [place]    PCP: Adria Cowart    Reason for visit: sinus sx     Duration of symptoms: 2 weeks     Have you been treated for this in the past? No    Additional comments: Would like to be seen today. Declined to do E visit or [facility] visit.     Can we leave a detailed message on this number? YES    Phone number patient can be reached at: [phone]    Best Time: any     Call taken on 12/30/2019 at 8:44 AM by Suzanna Manzo

## 2019-12-30 NOTE — PATIENT INSTRUCTIONS
Patient Education     Sinusitis (Antibiotic Treatment)    The sinuses are air-filled spaces within the bones of the face. They connect to the inside of the nose. Sinusitis is an inflammation of the tissue that lines the sinuses. Sinusitis can occur during a cold. It can also happen due to allergies to pollens and other particles in the air. Sinusitis can cause symptoms of sinus congestion and a feeling of fullness. A sinus infection causes fever, headache, and facial pain. There is often green or yellow fluid draining from the nose or into the back of the throat (post-nasal drip). You have been given antibiotics to treat this condition.  Home care    Take the full course of antibiotics as instructed. Do not stop taking them, even when you feel better.    Drink plenty of water, hot tea, and other liquids. This may help thin nasal mucus. It also may help your sinuses drain fluids.    Heat may help soothe painful areas of your face. Use a towel soaked in hot water. Or,  the shower and direct the warm spray onto your face. Using a vaporizer along with a menthol rub at night may also help soothe symptoms.     An expectorant with guaifenesin may help thin nasal mucus and help your sinuses drain fluids.    You can use an over-the-counter decongestant, unless a similar medicine was prescribed to you. Nasal sprays work the fastest. Use one that contains phenylephrine or oxymetazoline. First blow your nose gently. Then use the spray. Do not use these medicines more often than directed on the label. If you do, your symptoms may get worse. You may also take pills that contain pseudoephedrine. Don t use products that combine multiple medicines. This is because side effects may be increased. Read labels. You can also ask the pharmacist for help. (People with high blood pressure should not use decongestants. They can raise blood pressure.)    Over-the-counter antihistamines may help if allergies contributed to your  sinusitis.      Do not use nasal rinses or irrigation during an acute sinus infection, unless your healthcare provider tells you to. Rinsing may spread the infection to other areas in your sinuses.    Use acetaminophen or ibuprofen to control pain, unless another pain medicine was prescribed to you. If you have chronic liver or kidney disease or ever had a stomach ulcer, talk with your healthcare provider before using these medicines. (Aspirin should never be taken by anyone under age 18 who is ill with a fever. It may cause severe liver damage.)    Don't smoke. This can make symptoms worse.  Follow-up care  Follow up with your healthcare provider or our staff if you are not better in 1 week.  When to seek medical advice  Call your healthcare provider if any of these occur:    Facial pain or headache that gets worse    Stiff neck    Unusual drowsiness or confusion    Swelling of your forehead or eyelids    Vision problems, such as blurred or double vision    Fever of 100.4 F (38 C) or higher, or as directed by your healthcare provider    Seizure    Breathing problems    Symptoms don't go away in 10 days  Prevention  Here are steps you can take to help prevent an infection:    Keep good hand washing habits.    Don t have close contact with people who have sore throats, colds, or other upper respiratory infections.    Don t smoke, and stay away from secondhand smoke.    Stay up to date with of your vaccines.  Date Last Reviewed: 11/1/2017 2000-2018 [employer]. [address] [place] [address], [place] [address]. All rights reserved. This information is not intended as a substitute for professional medical care. Always follow your healthcare professional's instructions.

## 2019-12-30 NOTE — PROGRESS NOTES
Subjective     Jarrod Lewis is a 69 year old male who presents to clinic today for the following health issues:    HPI   Acute Illness   Acute illness concerns: cough comes and goes, sinus pressure,   Onset: 12/22/2019    Fever: no    Chills/Sweats: no    Headache (location?): YES    Sinus Pressure:YES    Conjunctivitis:  no    Ear Pain: no    Rhinorrhea: YES    Congestion: YES    Sore Throat: no     Cough: YES-productive of yellow sputum, productive of green sputum    Wheeze: no    Decreased Appetite: no    Nausea: no    Vomiting: no    Diarrhea:  no    Dysuria/Freq.: no    Fatigue/Achiness: YES    Sick/Strep Exposure: no     Therapies Tried and outcome: Tylenol, Sudafed, Nasacort, Loratadine helped a little.         Patient Active Problem List   Diagnosis     CARDIOVASCULAR SCREENING; LDL GOAL LESS THAN 130     Reducible right inguinal hernia     GI bleed     Advanced directives, counseling/discussion     Allergic rhinitis due to dust mite     Sinus pressure     Seasonal allergic rhinitis due to pollen     Allergic rhinitis due to mold     Penicillin allergy     Upper GI bleed     Past Surgical History:   Procedure Laterality Date     C TOTAL KNEE ARTHROPLASTY Bilateral      COLONOSCOPY  12/14/2012    Procedure: COLONOSCOPY;  Colonoscopy;  Surgeon: Sylvester Lucas MD;  Location:  GI     COLONOSCOPY N/A 10/26/2016    Procedure: COMBINED COLONOSCOPY, SINGLE OR MULTIPLE BIOPSY/POLYPECTOMY BY BIOPSY;  Surgeon: Trev Oquendo MD;  Location:  GI     ESOPHAGOSCOPY, GASTROSCOPY, DUODENOSCOPY (EGD), COMBINED N/A 4/24/2017    Procedure: COMBINED ESOPHAGOSCOPY, GASTROSCOPY, DUODENOSCOPY (EGD), BIOPSY SINGLE OR MULTIPLE;  ESOPHAGOSCOPY, GASTROSCOPY, DUODENOSCOPY (EGD) with biopsies by forceps;  Surgeon: Puma Dmoinguez MD;  Location:  GI     ESOPHAGOSCOPY, GASTROSCOPY, DUODENOSCOPY (EGD), COMBINED N/A 6/12/2017    Procedure: COMBINED ESOPHAGOSCOPY, GASTROSCOPY, DUODENOSCOPY (EGD);  ESOPHAGOSCOPY, GASTROSCOPY,  DUODENOSCOPY (EGD);  Surgeon: Sylvester Lucas MD;  Location:  GI     ESOPHAGOSCOPY, GASTROSCOPY, DUODENOSCOPY (EGD), COMBINED N/A 2019    Procedure: ESOPHAGOGASTRODUODENOSCOPY (EGD);  Surgeon: Artur Bruce MD;  Location: [facility]     HERNIORRHAPHY INGUINAL Right 3/8/2017    Procedure: HERNIORRHAPHY INGUINAL;  Surgeon: Kong Lassiter MD;  Location: [facility]     JOINT REPLACEMENT, HIP RT/LT Right 2012     MOHS MICROGRAPHIC PROCEDURE Left 2017    Left cheek       Social History     Tobacco Use     Smoking status: Former Smoker     Types: Pipe     Last attempt to quit:      Years since quittin.0     Smokeless tobacco: Never Used   Substance Use Topics     Alcohol use: Yes     Alcohol/week: 0.0 standard drinks     Comment: occasional     Family History   Problem Relation Age of Onset     Stomach Cancer Father          at age 46         Current Outpatient Medications   Medication Sig Dispense Refill     acetaminophen (TYLENOL) 325 MG tablet Take 325-650 mg by mouth every 4 hours as needed for mild pain or fever Reported on 3/6/2017 100 tablet      ferrous sulfate (FEROSUL) 325 (65 Fe) MG tablet Take 1 tablet (325 mg) by mouth 2 times daily       loratadine (CLARITIN) 10 MG tablet Take 10 mg by mouth daily       triamcinolone (KENALOG) 0.1 % external ointment Apply sparingly to affected area three times daily as needed. 15 g 1     triamcinolone (NASACORT) 55 MCG/ACT nasal aerosol Spray 2 sprays into both nostrils daily 1 Bottle 3     Allergies   Allergen Reactions     Dust Mites      Penicillins Unknown     As a child       Zithromax [Azithromycin Dihydrate]      diarrhea     Recent Labs   Lab Test 19  0622 19  1736 19  1511 17  0732  10/12/15  0952  10/07/15 02/14/14  0752   LDL  --   --  76 60  --  82   < >  --  72   HDL  --   --  53 50  --  45   < >  --  46   TRIG  --   --  81 53  --  60   < >  --  61   ALT  --  21  --   --   --  28  --  27  27 38   CR 0.91 1.09  1.02 0.96   < > 0.86   < > 0.89 1.00   GFRESTIMATED 86 69 75 78   < > 89  --   --  76   GFRESTBLACK >90 80 87 >90   < > >90  African American GFR Calc    --   --  >90   POTASSIUM 3.9 4.0 4.1 4.1   < > 4.4   < > 4.0 4.6   TSH  --   --   --   --   --  1.49  --   --  1.89    < > = values in this interval not displayed.      BP Readings from Last 3 Encounters:   12/30/19 120/82   11/18/19 138/82   09/13/19 138/80    Wt Readings from Last 3 Encounters:   12/30/19 126.6 kg (279 lb 2 oz)   11/18/19 126.6 kg (279 lb)   09/13/19 127.5 kg (281 lb)                      Reviewed and updated as needed this visit by Provider  Tobacco  Allergies  Meds  Problems  Med Hx  Surg Hx  Fam Hx         Review of Systems   ROS COMP: CONSTITUTIONAL:POSITIVE  for fatigue and malaise and NEGATIVE  for anorexia, chills, fever, myalgias and sweats  ENT/MOUTH: POSITIVE for Hx sinus infections, nasal congestion, rhinorrhea-purulent and sinus pressure and NEGATIVE for epistaxis, fever, sore throat, tinnitus, tooth pain and vertigo  RESP:POSITIVE for cough-productive and sputum green and NEGATIVE for dyspnea on exertion, Hx asthma, Hx chronic bronchitis, Hx COPD, Hx pneumonia, pleurisy and SOB/dyspnea  CV: NEGATIVE for chest pain, palpitations or peripheral edema  ROS otherwise negative      Objective    /82 (BP Location: Left arm, Patient Position: Chair, Cuff Size: Adult Large)   Pulse 92   Temp 98.6  F (37  C) (Temporal)   Resp 14   Wt 126.6 kg (279 lb 2 oz)   SpO2 95%   BMI 37.34 kg/m    Body mass index is 37.34 kg/m .  Physical Exam   GENERAL: healthy, alert and no distress  EYES: Eyes grossly normal to inspection, PERRL and conjunctivae and sclerae normal  HENT: normal cephalic/atraumatic, ear canals and TM's normal, nose and mouth without ulcers or lesions, rhinorrhea yellow and green, oropharynx clear, oral mucous membranes moist and sinuses: maxillary, frontal tenderness on bilaterally  NECK: no adenopathy, no asymmetry,  masses, or scars and thyroid normal to palpation  RESP: lungs clear to auscultation - no rales, rhonchi or wheezes  CV: regular rate and rhythm, normal S1 S2, no S3 or S4, no murmur, click or rub, no peripheral edema and peripheral pulses strong  ABDOMEN: soft, nontender, no hepatosplenomegaly, no masses and bowel sounds normal    Diagnostic Test Results:  Labs reviewed in Epic        Assessment & Plan     1. Acute sinusitis with symptoms > 10 days  Sinus pressure is primarily a problem of drainage.  You can best help your body clear the sinus secretions and pressure by opening up the natural passageways which are often blocked by viral colds and allergies.      Short courses of a nasal decongestant spray (Afrin or Neosinephrine) are one of the most effective tools in opening sinus drainage passageways.  Their use should be restricted to 3 days though due to the high risk of nasal addiction.  Pseudoephedrine or phenylephrine (Sudafed) is often helpful but it can cause elevations in blood pressure and insomnia.     Sometimes a nasal saline spray will help rinse out the nasal passages and feel good.     Guaifenesin (Robitussin or Mucinex) helps loosen secretions and often help make the mucous more liquid and easier to clear.    For pain and fevers, acetaminophen (Tylenol) is most appropriate.  Ibuprofen (Advil) or naproxen (Aleve) are useful too and last longer but they can cause elevation of blood pressure or stomach problems.    Antihistamines (Benadryl, Dimetapp, etc.) cause sedation, confusion, bowel and urinary abnormalities and are of little use for infectious causes of cough and nasal congestion.  Their use should be reserved for allergic symptoms.    The body needs to be treated well in order to help heal itself.  Rest as needed.  It is ok to reduce food intake if appetite is poor but it is quite important to maintain/increase fluid intake.  Sinus pressure and infections usually go away on their own with  appropriate care.  If symptoms worsen or persist beyond 10 days, an antibiotic might be worth trying to treat a possible bacterial component.    - doxycycline hyclate (VIBRAMYCIN) 100 MG capsule; Take 1 capsule (100 mg) by mouth 2 times daily for 10 days  Dispense: 20 capsule; Refill: 0       Patient Instructions     Patient Education     Sinusitis (Antibiotic Treatment)    The sinuses are air-filled spaces within the bones of the face. They connect to the inside of the nose. Sinusitis is an inflammation of the tissue that lines the sinuses. Sinusitis can occur during a cold. It can also happen due to allergies to pollens and other particles in the air. Sinusitis can cause symptoms of sinus congestion and a feeling of fullness. A sinus infection causes fever, headache, and facial pain. There is often green or yellow fluid draining from the nose or into the back of the throat (post-nasal drip). You have been given antibiotics to treat this condition.  Home care    Take the full course of antibiotics as instructed. Do not stop taking them, even when you feel better.    Drink plenty of water, hot tea, and other liquids. This may help thin nasal mucus. It also may help your sinuses drain fluids.    Heat may help soothe painful areas of your face. Use a towel soaked in hot water. Or,  the shower and direct the warm spray onto your face. Using a vaporizer along with a menthol rub at night may also help soothe symptoms.     An expectorant with guaifenesin may help thin nasal mucus and help your sinuses drain fluids.    You can use an over-the-counter decongestant, unless a similar medicine was prescribed to you. Nasal sprays work the fastest. Use one that contains phenylephrine or oxymetazoline. First blow your nose gently. Then use the spray. Do not use these medicines more often than directed on the label. If you do, your symptoms may get worse. You may also take pills that contain pseudoephedrine. Don t use  products that combine multiple medicines. This is because side effects may be increased. Read labels. You can also ask the pharmacist for help. (People with high blood pressure should not use decongestants. They can raise blood pressure.)    Over-the-counter antihistamines may help if allergies contributed to your sinusitis.      Do not use nasal rinses or irrigation during an acute sinus infection, unless your healthcare provider tells you to. Rinsing may spread the infection to other areas in your sinuses.    Use acetaminophen or ibuprofen to control pain, unless another pain medicine was prescribed to you. If you have chronic liver or kidney disease or ever had a stomach ulcer, talk with your healthcare provider before using these medicines. (Aspirin should never be taken by anyone under age 18 who is ill with a fever. It may cause severe liver damage.)    Don't smoke. This can make symptoms worse.  Follow-up care  Follow up with your healthcare provider or our staff if you are not better in 1 week.  When to seek medical advice  Call your healthcare provider if any of these occur:    Facial pain or headache that gets worse    Stiff neck    Unusual drowsiness or confusion    Swelling of your forehead or eyelids    Vision problems, such as blurred or double vision    Fever of 100.4 F (38 C) or higher, or as directed by your healthcare provider    Seizure    Breathing problems    Symptoms don't go away in 10 days  Prevention  Here are steps you can take to help prevent an infection:    Keep good hand washing habits.    Don t have close contact with people who have sore throats, colds, or other upper respiratory infections.    Don t smoke, and stay away from secondhand smoke.    Stay up to date with of your vaccines.  Date Last Reviewed: 11/1/2017 2000-2018 [employer]. [address] [place] [address], [place] [address]. All rights reserved. This information is not intended as a substitute for professional  medical care. Always follow your healthcare professional's instructions.               Return in about 1 month (around 1/30/2020) for Annual Preventive Exam.    Ezio Orellana MD  [facility]

## 2020-02-03 ENCOUNTER — OFFICE VISIT (OUTPATIENT)
Dept: FAMILY MEDICINE | Facility: OTHER | Age: 70
End: 2020-02-03
Payer: COMMERCIAL

## 2020-02-03 VITALS
BODY MASS INDEX: 37.57 KG/M2 | HEIGHT: 73 IN | HEART RATE: 88 BPM | OXYGEN SATURATION: 98 % | RESPIRATION RATE: 18 BRPM | SYSTOLIC BLOOD PRESSURE: 136 MMHG | WEIGHT: 283.5 LBS | TEMPERATURE: 98.5 F | DIASTOLIC BLOOD PRESSURE: 64 MMHG

## 2020-02-03 DIAGNOSIS — Z12.5 SCREENING FOR PROSTATE CANCER: ICD-10-CM

## 2020-02-03 DIAGNOSIS — Z00.00 ROUTINE GENERAL MEDICAL EXAMINATION AT A HEALTH CARE FACILITY: Primary | ICD-10-CM

## 2020-02-03 DIAGNOSIS — D64.9 ANEMIA, UNSPECIFIED TYPE: ICD-10-CM

## 2020-02-03 DIAGNOSIS — E78.5 HYPERLIPIDEMIA LDL GOAL <130: ICD-10-CM

## 2020-02-03 LAB
ALBUMIN SERPL-MCNC: 3.5 G/DL (ref 3.4–5)
ALBUMIN UR-MCNC: NEGATIVE MG/DL
ALP SERPL-CCNC: 106 U/L (ref 40–150)
ALT SERPL W P-5'-P-CCNC: 25 U/L (ref 0–70)
ANION GAP SERPL CALCULATED.3IONS-SCNC: 5 MMOL/L (ref 3–14)
APPEARANCE UR: CLEAR
AST SERPL W P-5'-P-CCNC: 21 U/L (ref 0–45)
BILIRUB DIRECT SERPL-MCNC: 0.1 MG/DL (ref 0–0.2)
BILIRUB SERPL-MCNC: 0.5 MG/DL (ref 0.2–1.3)
BILIRUB UR QL STRIP: NEGATIVE
BUN SERPL-MCNC: 26 MG/DL (ref 7–30)
CALCIUM SERPL-MCNC: 8.3 MG/DL (ref 8.5–10.1)
CHLORIDE SERPL-SCNC: 111 MMOL/L (ref 94–109)
CHOLEST SERPL-MCNC: 155 MG/DL
CO2 SERPL-SCNC: 25 MMOL/L (ref 20–32)
COLOR UR AUTO: YELLOW
CREAT SERPL-MCNC: 1.07 MG/DL (ref 0.66–1.25)
ERYTHROCYTE [DISTWIDTH] IN BLOOD BY AUTOMATED COUNT: 12.1 % (ref 10–15)
GFR SERPL CREATININE-BSD FRML MDRD: 71 ML/MIN/{1.73_M2}
GLUCOSE SERPL-MCNC: 102 MG/DL (ref 70–99)
GLUCOSE UR STRIP-MCNC: NEGATIVE MG/DL
HCT VFR BLD AUTO: 40.4 % (ref 40–53)
HDLC SERPL-MCNC: 53 MG/DL
HGB BLD-MCNC: 13.9 G/DL (ref 13.3–17.7)
HGB UR QL STRIP: ABNORMAL
KETONES UR STRIP-MCNC: NEGATIVE MG/DL
LDLC SERPL CALC-MCNC: 68 MG/DL
LEUKOCYTE ESTERASE UR QL STRIP: NEGATIVE
MCH RBC QN AUTO: 31.4 PG (ref 26.5–33)
MCHC RBC AUTO-ENTMCNC: 34.4 G/DL (ref 31.5–36.5)
MCV RBC AUTO: 91 FL (ref 78–100)
NITRATE UR QL: NEGATIVE
NONHDLC SERPL-MCNC: 102 MG/DL
PH UR STRIP: 6 PH (ref 5–7)
PLATELET # BLD AUTO: 221 10E9/L (ref 150–450)
POTASSIUM SERPL-SCNC: 4.2 MMOL/L (ref 3.4–5.3)
PROT SERPL-MCNC: 7.5 G/DL (ref 6.8–8.8)
PSA SERPL-ACNC: 2.05 UG/L (ref 0–4)
RBC # BLD AUTO: 4.43 10E12/L (ref 4.4–5.9)
RBC #/AREA URNS AUTO: NORMAL /HPF
SODIUM SERPL-SCNC: 141 MMOL/L (ref 133–144)
SOURCE: ABNORMAL
SP GR UR STRIP: >1.03 (ref 1–1.03)
TRIGL SERPL-MCNC: 171 MG/DL
UROBILINOGEN UR STRIP-ACNC: 0.2 EU/DL (ref 0.2–1)
WBC # BLD AUTO: 5 10E9/L (ref 4–11)
WBC #/AREA URNS AUTO: NORMAL /HPF

## 2020-02-03 PROCEDURE — G0103 PSA SCREENING: HCPCS | Performed by: INTERNAL MEDICINE

## 2020-02-03 PROCEDURE — 80076 HEPATIC FUNCTION PANEL: CPT | Performed by: INTERNAL MEDICINE

## 2020-02-03 PROCEDURE — 36415 COLL VENOUS BLD VENIPUNCTURE: CPT | Performed by: INTERNAL MEDICINE

## 2020-02-03 PROCEDURE — 80048 BASIC METABOLIC PNL TOTAL CA: CPT | Performed by: INTERNAL MEDICINE

## 2020-02-03 PROCEDURE — 81001 URINALYSIS AUTO W/SCOPE: CPT | Performed by: INTERNAL MEDICINE

## 2020-02-03 PROCEDURE — 99397 PER PM REEVAL EST PAT 65+ YR: CPT | Performed by: INTERNAL MEDICINE

## 2020-02-03 PROCEDURE — 80061 LIPID PANEL: CPT | Performed by: INTERNAL MEDICINE

## 2020-02-03 PROCEDURE — 85027 COMPLETE CBC AUTOMATED: CPT | Performed by: INTERNAL MEDICINE

## 2020-02-03 ASSESSMENT — MIFFLIN-ST. JEOR: SCORE: 2101.89

## 2020-02-03 ASSESSMENT — ENCOUNTER SYMPTOMS
CONSTIPATION: 0
SORE THROAT: 0
HEMATOCHEZIA: 0
EYE PAIN: 0
NAUSEA: 0
JOINT SWELLING: 0
WEAKNESS: 0
MYALGIAS: 0
DIZZINESS: 0
DYSURIA: 0
HEADACHES: 0
ARTHRALGIAS: 1
NERVOUS/ANXIOUS: 0
COUGH: 0
FEVER: 0
ABDOMINAL PAIN: 0
FREQUENCY: 0
SHORTNESS OF BREATH: 0
PALPITATIONS: 0
HEARTBURN: 0
HEMATURIA: 0
DIARRHEA: 0
CHILLS: 0
PARESTHESIAS: 0

## 2020-02-03 ASSESSMENT — PAIN SCALES - GENERAL: PAINLEVEL: NO PAIN (0)

## 2020-02-03 ASSESSMENT — ACTIVITIES OF DAILY LIVING (ADL): CURRENT_FUNCTION: NO ASSISTANCE NEEDED

## 2020-02-03 NOTE — PROGRESS NOTES
"SUBJECTIVE:   Jarrod Lewis is a 69 year old male who presents for Preventive Visit.   Are you in the first 12 months of your Medicare coverage?  No    Healthy Habits:     In general, how would you rate your overall health?  Good    Frequency of exercise:  None    Do you usually eat at least 4 servings of fruit and vegetables a day, include whole grains    & fiber and avoid regularly eating high fat or \"junk\" foods?  No    Taking medications regularly:  Yes    Medication side effects:  None    Ability to successfully perform activities of daily living:  No assistance needed    Home Safety:  No safety concerns identified    Hearing Impairment:  Difficulty following a conversation in a noisy restaurant or crowded room and need to ask people to speak up or repeat themselves    In the past 6 months, have you been bothered by leaking of urine? Yes    In general, how would you rate your overall mental or emotional health?  Good      PHQ-2 Total Score: 0    Additional concerns today:  No    Do you feel safe in your environment? Yes    Have you ever done Advance Care Planning? (For example, a Health Directive, POLST, or a discussion with a medical provider or your loved ones about your wishes): No, advance care planning information given to patient to review.  Patient plans to discuss their wishes with loved ones or provider.        Fall risk  Fallen 2 or more times in the past year?: Yes  Any fall with injury in the past year?: No  Timed Up and Go Test (>13.5 is fall risk; contact physician) : 9  click delete button to remove this line now  Cognitive Screening   1) Repeat 3 items (Leader, Season, Table)    2) Clock draw:   NORMAL  3) 3 item recall: Recalls 3 objects  Results: 3 items recalled: COGNITIVE IMPAIRMENT LESS LIKELY    Mini-CogTM Copyright RODERICK Amos. Licensed by the author for use in [facility]; reprinted with permission (madi@.[email or web address]). All rights reserved.          Reviewed and updated as needed this " visit by clinical staff  Tobacco  Allergies  Meds  Med Hx  Surg Hx  Fam Hx  Soc Hx        Reviewed and updated as needed this visit by Provider        Social History     Tobacco Use     Smoking status: Former Smoker     Types: Pipe     Last attempt to quit: 2012     Years since quittin.0     Smokeless tobacco: Never Used   Substance Use Topics     Alcohol use: Yes     Alcohol/week: 0.0 standard drinks     Comment: occasional         Alcohol Use 2/3/2020   Prescreen: >3 drinks/day or >7 drinks/week? No   Prescreen: >3 drinks/day or >7 drinks/week? -           -------------------------------------    Current providers sharing in care for this patient include:   Patient Care Team:  Adria Cowart DO as PCP - General (Internal Medicine)  Adria Cowart DO as Assigned PCP    The following health maintenance items are reviewed in Epic and correct as of today:  Health Maintenance   Topic Date Due     ZOSTER IMMUNIZATION (1 of 2) 2001     PHQ-2  2020     MEDICARE ANNUAL WELLNESS VISIT  2020     INFLUENZA VACCINE (1) 2020 (Originally 2019)     FALL RISK ASSESSMENT  2020     ADVANCE CARE PLANNING  2021     LIPID  2024     DTAP/TDAP/TD IMMUNIZATION (3 - Td) 2024     COLONOSCOPY  10/26/2026     HEPATITIS C SCREENING  Completed     PNEUMOCOCCAL IMMUNIZATION 65+ LOW/MEDIUM RISK  Completed     AORTIC ANEURYSM SCREENING (SYSTEM ASSIGNED)  Completed     IPV IMMUNIZATION  Aged Out     MENINGITIS IMMUNIZATION  Aged Out     Lab work is in process  Labs reviewed in EPIC  BP Readings from Last 3 Encounters:   20 136/64   19 120/82   19 138/82    Wt Readings from Last 3 Encounters:   20 128.6 kg (283 lb 8 oz)   19 126.6 kg (279 lb 2 oz)   19 126.6 kg (279 lb)                  Patient Active Problem List   Diagnosis     CARDIOVASCULAR SCREENING; LDL GOAL LESS THAN 130     Reducible right inguinal hernia     GI bleed      Advanced directives, counseling/discussion     Allergic rhinitis due to dust mite     Sinus pressure     Seasonal allergic rhinitis due to pollen     Allergic rhinitis due to mold     Penicillin allergy     Upper GI bleed     Past Surgical History:   Procedure Laterality Date     C TOTAL KNEE ARTHROPLASTY Bilateral      COLONOSCOPY  2012    Procedure: COLONOSCOPY;  Colonoscopy;  Surgeon: Sylvester Lucas MD;  Location:  GI     COLONOSCOPY N/A 10/26/2016    Procedure: COMBINED COLONOSCOPY, SINGLE OR MULTIPLE BIOPSY/POLYPECTOMY BY BIOPSY;  Surgeon: Trev Oquendo MD;  Location:  GI     ESOPHAGOSCOPY, GASTROSCOPY, DUODENOSCOPY (EGD), COMBINED N/A 2017    Procedure: COMBINED ESOPHAGOSCOPY, GASTROSCOPY, DUODENOSCOPY (EGD), BIOPSY SINGLE OR MULTIPLE;  ESOPHAGOSCOPY, GASTROSCOPY, DUODENOSCOPY (EGD) with biopsies by forceps;  Surgeon: Puma Dominguez MD;  Location:  GI     ESOPHAGOSCOPY, GASTROSCOPY, DUODENOSCOPY (EGD), COMBINED N/A 2017    Procedure: COMBINED ESOPHAGOSCOPY, GASTROSCOPY, DUODENOSCOPY (EGD);  ESOPHAGOSCOPY, GASTROSCOPY, DUODENOSCOPY (EGD);  Surgeon: Sylvester Lucas MD;  Location:  GI     ESOPHAGOSCOPY, GASTROSCOPY, DUODENOSCOPY (EGD), COMBINED N/A 2019    Procedure: ESOPHAGOGASTRODUODENOSCOPY (EGD);  Surgeon: Artur Bruce MD;  Location:  GI     HERNIORRHAPHY INGUINAL Right 3/8/2017    Procedure: HERNIORRHAPHY INGUINAL;  Surgeon: Kong Lassiter MD;  Location: [facility]     JOINT REPLACEMENT, HIP RT/LT Right 2012     MOHS MICROGRAPHIC PROCEDURE Left 2017    Left cheek       Social History     Tobacco Use     Smoking status: Former Smoker     Types: Pipe     Last attempt to quit:      Years since quittin.0     Smokeless tobacco: Never Used   Substance Use Topics     Alcohol use: Yes     Alcohol/week: 0.0 standard drinks     Comment: occasional     Family History   Problem Relation Age of Onset     Stomach Cancer Father          at age 46  "        Current Outpatient Medications   Medication Sig Dispense Refill     acetaminophen (TYLENOL) 325 MG tablet Take 325-650 mg by mouth every 4 hours as needed for mild pain or fever Reported on 3/6/2017 100 tablet      ferrous sulfate (FEROSUL) 325 (65 Fe) MG tablet Take 1 tablet (325 mg) by mouth 2 times daily       loratadine (CLARITIN) 10 MG tablet Take 10 mg by mouth daily       triamcinolone (KENALOG) 0.1 % external ointment Apply sparingly to affected area three times daily as needed. 15 g 1     triamcinolone (NASACORT) 55 MCG/ACT nasal aerosol Spray 2 sprays into both nostrils daily 1 Bottle 3     Allergies   Allergen Reactions     Dust Mites      Penicillins Unknown     As a child       Zithromax [Azithromycin Dihydrate]      diarrhea         Review of Systems  CONSTITUTIONAL: NEGATIVE for fever, chills, change in weight  INTEGUMENTARY/SKIN: NEGATIVE for worrisome rashes, moles or lesions  EYES: NEGATIVE for vision changes or irritation  ENT/MOUTH: NEGATIVE for ear, mouth and throat problems  RESP: NEGATIVE for significant cough or SOB  BREAST: NEGATIVE for masses, tenderness or discharge  CV: NEGATIVE for chest pain, palpitations or peripheral edema  GI: NEGATIVE for nausea, abdominal pain, heartburn, or change in bowel habits  : NEGATIVE for frequency, dysuria, or hematuria  MUSCULOSKELETAL: NEGATIVE for significant arthralgias or myalgia  NEURO: NEGATIVE for weakness, dizziness or paresthesias  ENDOCRINE: NEGATIVE for temperature intolerance, skin/hair changes  HEME: NEGATIVE for bleeding problems  PSYCHIATRIC: NEGATIVE for changes in mood or affect    OBJECTIVE:   /64 (BP Location: Left arm, Patient Position: Sitting, Cuff Size: Adult Large)   Pulse 88   Temp 98.5  F (36.9  C) (Temporal)   Resp 18   Ht 1.842 m (6' 0.5\")   Wt 128.6 kg (283 lb 8 oz)   SpO2 98%   BMI 37.92 kg/m   Estimated body mass index is 37.92 kg/m  as calculated from the following:    Height as of this encounter: " "1.842 m (6' 0.5\").    Weight as of this encounter: 128.6 kg (283 lb 8 oz).  Physical Exam  GENERAL: healthy, alert and no distress  EYES: Eyes grossly normal to inspection, PERRL and conjunctivae and sclerae normal  HENT: ear canals and TM's normal, nose and mouth without ulcers or lesions  NECK: no adenopathy, no asymmetry, masses, or scars and thyroid normal to palpation  RESP: lungs clear to auscultation - no rales, rhonchi or wheezes  CV: regular rate and rhythm, normal S1 S2, no S3 or S4, no murmur, click or rub, no peripheral edema and peripheral pulses strong  ABDOMEN: soft, nontender, no hepatosplenomegaly, no masses and bowel sounds normal  MS: no gross musculoskeletal defects noted, no edema  SKIN: no suspicious lesions or rashes  NEURO: Normal strength and tone, mentation intact and speech normal  PSYCH: mentation appears normal, affect normal/bright    Diagnostic Test Results:  No results found for this or any previous visit (from the past 24 hour(s)).    ASSESSMENT / PLAN:       ICD-10-CM    1. Routine general medical examination at a health care facility Z00.00 Basic metabolic panel     *UA reflex to Microscopic and Culture ([facility] (except [place] and [place])   2. Anemia, unspecified type D64.9 CBC with platelets   3. Screening for prostate cancer Z12.5 PSA, screen   4. Hyperlipidemia LDL goal <130 E78.5 Lipid Profile     Hepatic panel       COUNSELING:  Reviewed preventive health counseling, as reflected in patient instructions       Regular exercise       Healthy diet/nutrition       Vision screening       Hearing screening       Dental care    Estimated body mass index is 37.92 kg/m  as calculated from the following:    Height as of this encounter: 1.842 m (6' 0.5\").    Weight as of this encounter: 128.6 kg (283 lb 8 oz).         reports that he quit smoking about 8 years ago. His smoking use included pipe. He has never used smokeless tobacco.      Appropriate preventive " services were discussed with this patient, including applicable screening as appropriate for cardiovascular disease, diabetes, osteopenia/osteoporosis, and glaucoma.  As appropriate for age/gender, discussed screening for colorectal cancer, prostate cancer, breast cancer, and cervical cancer. Checklist reviewing preventive services available has been given to the patient.    Reviewed patients plan of care and provided an AVS. The Basic Care Plan (routine screening as documented in Health Maintenance) for Jarrod meets the Care Plan requirement. This Care Plan has been established and reviewed with the Patient.    Counseling Resources:  ATP IV Guidelines  Pooled Cohorts Equation Calculator  Breast Cancer Risk Calculator  FRAX Risk Assessment  ICSI Preventive Guidelines  Dietary Guidelines for Americans, 2010  USDA's MyPlate  ASA Prophylaxis  Lung CA Screening    Adria Cowart, DO  [facility]    Identified Health Risks:

## 2020-02-04 NOTE — RESULT ENCOUNTER NOTE
Dear Jarrod, your recent test results are attached.  Urine sample is negative.  The cholesterol is well controlled with an LDL of 68.  Liver tests are normal.  Chemistry panel is normal including the blood sugar and kidney function.  Prostate-specific antigen is consistent with a low risk of prostate cancer.  The blood cell count is normal showing no evidence of anemia or leukemia.  You will be contacted with any outstanding results when they are available.  Feel free to contact me via the office or My Chart if you have any questions regarding the above.  Sincerely,  DO DIAZ MajorOI

## 2020-02-05 NOTE — DISCHARGE SUMMARY
[facility]    Discharge Summary  Hospitalist    Date of Admission:  4/30/2019  Date of Discharge:  5/2/2019  2:08 PM  Discharging Provider: Nan Perez MD  Date of Service (when I saw the patient): 05/02/19    Discharge Diagnoses   Upper GI bleeding  PUD  Acute blood loss anemia  H/o arthitis    History of Present Illness   Jarrod Lewis is a 68 year old male with history of arthritis, gastric ulcer (2017) who presented to [facility] with dark stools and fatigue, transferred to [facility] for gastroenterology evaluation for suspected upper GI bleed. Reports recent increased use of ASA for headaches/arthritis/cold symptoms; for a detailed HPI- please refer to H&P done by Dr Urszula Cruz on 04/30/2019.    Hospital Course   Jarrod Lewis was admitted on 4/30/2019 for further evaluation of dark stools and fatigue; he was found to have low Hb of 9.8 on presentation at [facility], from 13.1 in 01/2019; occult blood was positive; he admitted recent increase of ASA for headaches/arthritis/cold symptoms; he was started on PPI drip and was seen by GI; he underwent a EGD on 05/01-which showed- 3 non-bleeding cratered duodenal ulcers with no stigmata of bleeding  in the duodenal bulb; the largest lesion was 6 mm in largest dimension; esophagus and stomach were normal. He was transfused 1 unit of PRBCs; serial Hb up to 9.2--8.9--9.5--8.5--8.4- stable; hemodynamically he remained stable; GI recommended to continue with PPI 40 mg po BID for 2 weeks and then PPI 40 mg po daily indefinitely; he should avoid ASA and other NSAIDs; discussed with GI who cleared him for discharge; I started him on iron supplementation; he was feeling fine at the time of the discharge; he will follow up with his PMD next week; repeat Hb and H pylori testing recommended.      Nan Perez MD    Significant Results and Procedures   EGD 05/01/2019    The esophagus was normal.   The stomach was normal.   Three  non-bleeding cratered duodenal ulcers with no stigmata of bleeding were found in the duodenal bulb. The largest lesion was 6 mm in largest  dimension.   The second portion of the duodenum was normal.       Pending Results   None  Unresulted Labs Ordered in the Past 30 Days of this Admission     No orders found from 3/1/2019 to 5/1/2019.          Code Status   Full Code       Primary Care Physician   Adria Cowart    Physical Exam   Temp: 98  F (36.7  C) Temp src: Oral BP: 122/82 Pulse: 70 Heart Rate: 75 Resp: 16 SpO2: 95 % O2 Device: None (Room air)    There were no vitals filed for this visit.  Vital Signs with Ranges  Temp:  [97.4  F (36.3  C)-98.9  F (37.2  C)] 98  F (36.7  C)  Pulse:  [57-87] 70  Heart Rate:  [59-84] 75  Resp:  [11-20] 16  BP: (105-143)/(61-88) 122/82  SpO2:  [94 %-100 %] 95 %  I/O last 3 completed shifts:  In: 300 [I.V.:300]  Out: 300 [Urine:300]    Constitutional: Awake, alert, cooperative, no apparent distress.  Eyes: Conjunctiva and pupils examined and normal.  HEENT: Moist mucous membranes, normal dentition.  Respiratory: Clear to auscultation bilaterally, no crackles or wheezing.  Cardiovascular: Regular rate and rhythm, normal S1 and S2, and no murmur noted.  GI: Soft, non-distended, non-tender, normal bowel sounds.  Lymph/Hematologic: No anterior cervical or supraclavicular adenopathy.  Skin: No rashes, no cyanosis, no edema.  Musculoskeletal: No joint swelling, erythema or tenderness.  Neurologic: Cranial nerves 2-12 intact, normal strength and sensation.  Psychiatric: Alert, oriented to person, place and time, no obvious anxiety or depression.      Discharge Disposition   Discharged to home  Condition at discharge: Good    Consultations This Hospital Stay   GASTROENTEROLOGY IP CONSULT    Time Spent on this Encounter   Nan PENA, personally saw the patient today and spent less than or equal to 30 minutes discharging this patient.    Discharge Orders       Activity    Your activity upon discharge: activity as tolerated     When to contact your care team    Call your primary doctor or return to ER if you have any of the following: temperature greater than 100.5 or less than 96, chills, dizziness, loss of consciousness, black stools, bloody stools, coffee-ground vomiting, abdominal pain.     Discharge Instructions    Avoid Aspirin, Advil, Motrin, Ibuprofen, Aleve, Naproxen.     Reason for your hospital stay    GI bleeding due to duodenal ulcers- not actively bleeding at the time of EGD. You should continue with Protonix 40 mg twice daily for 2 months, then 40 mg daily indefinitely.     Follow-up and recommended labs and tests     Follow up with primary care provider, Adria Cowart, within 7 days for hospital follow- up.  The following labs/tests are recommended: Hb; check for H pylori.     DNR (Do Not Resuscitate)     Diet    Follow this diet upon discharge: Orders Placed This Encounter      Regular Diet Adult     Discharge Medications   Current Discharge Medication List      START taking these medications    Details   ferrous sulfate (FEROSUL) 325 (65 Fe) MG tablet Take 1 tablet (325 mg) by mouth 2 times daily    Associated Diagnoses: Anemia due to blood loss, acute      pantoprazole (PROTONIX) 40 MG EC tablet Take 1 tablet (40 mg) by mouth 2 times daily (before meals) for 2 months, then continue Protonix 40 mg daily indefinitely.  Qty: 60 tablet, Refills: 0    Comments: Future refills by PCP Dr. Adria Cowart with phone number [phone].  Associated Diagnoses: Upper GI bleed         CONTINUE these medications which have NOT CHANGED    Details   acetaminophen (TYLENOL) 325 MG tablet Take 325-650 mg by mouth every 4 hours as needed for mild pain or fever Reported on 3/6/2017  Qty: 100 tablet      GARLIC PO Take 1 tablet by mouth daily (OTC: Patient unsure of strength).      loratadine (CLARITIN) 10 MG tablet Take 10 mg by mouth daily       LUTEIN PO Take 1 tablet by mouth daily      Saw Palmetto, Serenoa repens, (SAW PALMETTO PO) Take 1 capsule by mouth daily (OTC: Patient unsure of strength).      triamcinolone (KENALOG) 0.1 % external ointment Apply sparingly to affected area three times daily as needed.  Qty: 15 g, Refills: 1    Associated Diagnoses: Other atopic dermatitis      triamcinolone (NASACORT) 55 MCG/ACT nasal aerosol Spray 2 sprays into both nostrils daily  Qty: 1 Bottle, Refills: 3    Associated Diagnoses: Sinus pressure; Allergic rhinitis due to dust mite         STOP taking these medications       aspirin (ASA) 325 MG EC tablet Comments:   Reason for Stopping:         omeprazole (PRILOSEC) 40 MG DR capsule Comments:   Reason for Stopping:             Allergies   Allergies   Allergen Reactions     Dust Mites      Penicillins Unknown     As a child       Zithromax [Azithromycin Dihydrate]      diarrhea     Data   Most Recent 3 CBC's:  Recent Labs   Lab Test 05/02/19  1200 05/02/19  0742 05/01/19  1753  05/01/19 0622 04/30/19 1736 01/22/19  1511   WBC  --   --   --   --  5.4  --  7.5 5.8   HGB 8.4* 8.5* 9.5*   < > 9.2*   < > 9.8* 13.1*   MCV  --   --   --   --  89  --  92 92   PLT  --   --   --   --  195  --  245 271    < > = values in this interval not displayed.      Most Recent 3 BMP's:  Recent Labs   Lab Test 05/01/19  0622 04/30/19 1736 01/22/19  1511    139 141   POTASSIUM 3.9 4.0 4.1   CHLORIDE 116* 110* 108   CO2 21 22 25   BUN 47* 68* 27   CR 0.91 1.09 1.02   ANIONGAP 7 7 8   DYLAN 8.3* 8.5 8.5   GLC 99 120* 102*     Most Recent 2 LFT's:  Recent Labs   Lab Test 04/30/19  1736 10/12/15  0952   AST 11 24   ALT 21 28   ALKPHOS 76 93   BILITOTAL 0.4 0.8     Most Recent INR's and Anticoagulation Dosing History:  Anticoagulation Dose History     Recent Dosing and Labs Latest Ref Rng & Units 1/27/2012 10/7/2015 10/7/2015 10/21/2016 4/30/2019    INR 0.86 - 1.14 1.0 1.0 1.0 0.97 1.06        Most Recent 3 Troponin's:No lab  results found.  Most Recent Cholesterol Panel:  Recent Labs   Lab Test 01/22/19  1511   CHOL 145   LDL 76   HDL 53   TRIG 81     Most Recent 6 Bacteria Isolates From Any Culture (See EPIC Reports for Culture Details):  Recent Labs   Lab Test 10/21/16  1545   CULT No MRSA isolated     Most Recent TSH, T4 and A1c Labs:  Recent Labs   Lab Test 10/12/15  0952   TSH 1.49     Results for orders placed or performed during the hospital encounter of 03/28/16   CT Chest w/o Contrast    Narrative    CT CHEST W/O CONTRAST 3/28/2016 8:15 AM    HISTORY: Pulmonary nodules seen on chest CT of October 2015. Followup  pulmonary nodules.    COMPARISON: Images from chest CT, 10/7/2015. The outside report is not  available.    TECHNIQUE: Noncontrast chest CT.    FINDINGS: Scattered bilateral subcentimeter pulmonary nodules are  stable in comparison with the previous exam of 10/7/2015. No new  nodules in comparison with the previous exam. No pleural effusion or  pneumothorax. No airspace consolidation.    For future reference a right upper lobe pulmonary nodule is stable  measuring 0.5 cm (image 24, series 3). A left lower lobe pulmonary  nodule is stable measuring 0.5 cm (image 32, series 3).    There is mediastinal and bilateral hilar adenopathy which is also  unchanged in comparison with the previous exam. For future reference,  the subcarinal adenopathy measures 2.3 cm in AP dimension (image 36,  series 2).    Normal heart size. No pericardial effusion. No axillary adenopathy.  Thyroid is unremarkable.    No acute abnormality in the visualized upper abdomen.      Impression    IMPRESSION:   1. Stable bilateral subcentimeter pulmonary nodules.  2. Stable mediastinal and hilar adenopathy.  3. Continuing surveillance is recommended with a followup noncontrast  chest CT in one year.    NATHAN ADKINS MD      No

## 2020-03-20 ENCOUNTER — VIRTUAL VISIT (OUTPATIENT)
Dept: FAMILY MEDICINE | Facility: OTHER | Age: 70
End: 2020-03-20
Payer: COMMERCIAL

## 2020-03-20 DIAGNOSIS — J01.00 ACUTE NON-RECURRENT MAXILLARY SINUSITIS: Primary | ICD-10-CM

## 2020-03-20 PROCEDURE — 99442 ZZC PHYSICIAN TELEPHONE EVALUATION 11-20 MIN: CPT | Performed by: INTERNAL MEDICINE

## 2020-03-20 RX ORDER — DOXYCYCLINE HYCLATE 100 MG
100 TABLET ORAL 2 TIMES DAILY
Qty: 14 TABLET | Refills: 0 | Status: SHIPPED | OUTPATIENT
Start: 2020-03-20 | End: 2020-04-24

## 2020-03-20 NOTE — PROGRESS NOTES
"Jarrod Lewis is a 69 year old male who is being evaluated via a billable telephone visit.      The patient has been notified of following:     \"This telephone visit will be conducted via a call between you and your physician/provider. We have found that certain health care needs can be provided without the need for a physical exam.  This service lets us provide the care you need with a short phone conversation.  If a prescription is necessary we can send it directly to your pharmacy.  If lab work is needed we can place an order for that and you can then stop by our lab to have the test done at a later time.    If during the course of the call the physician/provider feels a telephone visit is not appropriate, you will not be charged for this service.\"     Jarrod Lewis complains of    Chief Complaint   Patient presents with     Sinus Problem     cold started 3/1-3/10, now has sinus dainage and sore throat, no facial pain, mild headache, no fever. Taken Musinex and Tylenol- helped         I have reviewed and updated the patient's Past Medical History, Social History, Family History and Medication List.    ALLERGIES  Dust mites; Penicillins; and Zithromax [azithromycin dihydrate]                      Chief Complaint         Patient presents via telephone complaining of green posterior nasal drainage, frontal and maxillary pressure, nasal congestion, he has had some mild chills but no significant fever.  He has checked it with a thermometer.  He is taking food and fluids adequately.  His allergies are reviewed.  He notes that he has been working regularly but has had no known exposure to a coronavirus recipient.  He has no cough or pulmonary symptoms.                       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     Surgical History:   has a past " surgical history that includes Colonoscopy (12/14/2012); Colonoscopy (N/A, 10/26/2016); Mohs micrographic procedure (Left, 01/02/2017); joint replacement, hip rt/lt (Right, 02/03/2012); TOTAL KNEE ARTHROPLASTY (Bilateral); Herniorrhaphy inguinal (Right, 3/8/2017); Esophagoscopy, gastroscopy, duodenoscopy (EGD), combined (N/A, 4/24/2017); Esophagoscopy, gastroscopy, duodenoscopy (EGD), combined (N/A, 6/12/2017); and Esophagoscopy, gastroscopy, duodenoscopy (EGD), combined (N/A, 5/1/2019).     Social History:   reports that he quit smoking about 8 years ago. His smoking use included pipe. He has never used smokeless tobacco. He reports current alcohol use. He reports that he does not use drugs.     Family History:  family history includes Stomach Cancer in his father.            MEDICATIONS  Current Outpatient Medications   Medication Sig Dispense Refill     acetaminophen (TYLENOL) 325 MG tablet Take 325-650 mg by mouth every 4 hours as needed for mild pain or fever Reported on 3/6/2017 100 tablet      ferrous sulfate (FEROSUL) 325 (65 Fe) MG tablet Take 1 tablet (325 mg) by mouth 2 times daily       loratadine (CLARITIN) 10 MG tablet Take 10 mg by mouth daily       Multiple Vitamins-Minerals (ZINC PO)        triamcinolone (KENALOG) 0.1 % external ointment Apply sparingly to affected area three times daily as needed. 15 g 1         --------------------------------------------------------------------------------------------------------------------                              Review of Systems     LUNGS: Pt denies: cough, excess sputum, hemoptysis, or shortness of breath.   HEART: Pt denies: chest pain, arrhythmia, syncope, tachy or bradyarrhythmia.   GI: Pt denies: nausea, vomiting, diarrhea, constipation, melena, or hematochezia.   ENT: Pt denies: sore throat.  Acknowledges significant nasal congestion.  Denies epistaxis, difficulty swallowing, or changes in base-line hearing.  Does complain of facial discomfort in  the maxillary and frontal region.      Examination       There were no vitals taken for this visit.      No examination was performed due to this being a telephone visit.         Decision Making       1. Acute non-recurrent maxillary sinusitis  Mucolytic's, antihistamines, Tylenol.  Moist vaporizer may be helpful  - doxycycline hyclate (VIBRA-TABS) 100 MG tablet; Take 1 tablet (100 mg) by mouth 2 times daily  Dispense: 14 tablet; Refill: 0              Phone call duration: 11 minutes    Adria Cowart DO

## 2020-04-24 ENCOUNTER — VIRTUAL VISIT (OUTPATIENT)
Dept: FAMILY MEDICINE | Facility: CLINIC | Age: 70
End: 2020-04-24
Payer: COMMERCIAL

## 2020-04-24 DIAGNOSIS — J01.01 ACUTE RECURRENT MAXILLARY SINUSITIS: Primary | ICD-10-CM

## 2020-04-24 PROCEDURE — 99213 OFFICE O/P EST LOW 20 MIN: CPT | Mod: TEL | Performed by: INTERNAL MEDICINE

## 2020-04-24 RX ORDER — DOXYCYCLINE 100 MG/1
100 CAPSULE ORAL 2 TIMES DAILY
Qty: 20 CAPSULE | Refills: 0 | Status: SHIPPED | OUTPATIENT
Start: 2020-04-24 | End: 2020-12-28

## 2020-04-24 ASSESSMENT — PAIN SCALES - GENERAL: PAINLEVEL: NO PAIN (0)

## 2020-04-24 NOTE — NURSING NOTE
Health Maintenance Due   Topic Date Due     ZOSTER IMMUNIZATION (1 of 2) 06/23/2001      Aishwarya Aguilar LPN........4/24/2020 9:21 AM

## 2020-04-24 NOTE — PROGRESS NOTES
"Jarrod Lewis is a 69 year old male who is being evaluated via a billable telephone visit.      The patient has been notified of following:     \"This telephone visit will be conducted via a call between you and your physician/provider. We have found that certain health care needs can be provided without the need for a physical exam.  This service lets us provide the care you need with a short phone conversation.  If a prescription is necessary we can send it directly to your pharmacy.  If lab work is needed we can place an order for that and you can then stop by our lab to have the test done at a later time.    Telephone visits are billed at different rates depending on your insurance coverage. During this emergency period, for some insurers they may be billed the same as an in-person visit.  Please reach out to your insurance provider with any questions.    If during the course of the call the physician/provider feels a telephone visit is not appropriate, you will not be charged for this service.\"    Patient has given verbal consent for Telephone visit?  Yes    How would you like to obtain your AVS? Esmerhart    Subjective     Jarrod Lewis is a 69 year old male who presents to clinic today for the following health issues:    HPI  Acute Illness   Acute illness concerns: Sinus issues   Onset: Ongoing since March     Fever: no    Chills/Sweats: YES    Headache (location?): YES    Sinus Pressure:YES- Upper teeth and around eyes    Conjunctivitis:  no    Ear Pain: YES- A little pressure in bilateral ears     Rhinorrhea: YES    Congestion: no     Sore Throat: YES     Cough: no    Wheeze: no    Decreased Appetite: no    Nausea: no    Vomiting: no    Diarrhea:  no    Dysuria/Freq.: no    Fatigue/Achiness: no    Sick/Strep Exposure: no     Therapies Tried and outcome: In March he was prescribed Doxycyline for a sinus infection. He has been taking OTC decongestants, nasal sprays and Tylenol.                     Chief " Complaint         The patient is a pleasant 69-year-old gentleman who presents virtually with pressure in his sinuses once again.  He was seen and treated about a month ago and did have complete resolution of his symptoms.  He notes that they now have recurred.  We question the possibility of some underlying allergic symptoms.  He does continue the Claritin on a regular basis.  He has not been using any nasal steroid spray.  Otherwise, he has been doing quite well.  He is working regularly and working aggressively on his avoidance.                         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     Surgical History:   has a past surgical history that includes Colonoscopy (12/14/2012); Colonoscopy (N/A, 10/26/2016); Mohs micrographic procedure (Left, 01/02/2017); joint replacement, hip rt/lt (Right, 02/03/2012); TOTAL KNEE ARTHROPLASTY (Bilateral); Herniorrhaphy inguinal (Right, 3/8/2017); Esophagoscopy, gastroscopy, duodenoscopy (EGD), combined (N/A, 4/24/2017); Esophagoscopy, gastroscopy, duodenoscopy (EGD), combined (N/A, 6/12/2017); and Esophagoscopy, gastroscopy, duodenoscopy (EGD), combined (N/A, 5/1/2019).     Social History:   reports that he quit smoking about 8 years ago. His smoking use included pipe. He has never used smokeless tobacco. He reports current alcohol use. He reports that he does not use drugs.     Family History:  family history includes Stomach Cancer in his father.            MEDICATIONS  Current Outpatient Medications   Medication Sig Dispense Refill     acetaminophen (TYLENOL) 325 MG tablet Take 325-650 mg by mouth every 4 hours as needed for mild pain or fever Reported on 3/6/2017 100 tablet      doxycycline hyclate (VIBRAMYCIN) 100 MG capsule Take 1 capsule (100 mg) by mouth 2 times daily 20 capsule 0     ferrous  sulfate (FEROSUL) 325 (65 Fe) MG tablet Take 1 tablet (325 mg) by mouth 2 times daily       loratadine (CLARITIN) 10 MG tablet Take 10 mg by mouth daily       Multiple Vitamins-Minerals (ZINC PO)        triamcinolone (KENALOG) 0.1 % external ointment Apply sparingly to affected area three times daily as needed. 15 g 1         --------------------------------------------------------------------------------------------------------------------                              Review of Systems       LUNGS: Pt denies: cough, excess sputum, hemoptysis, or shortness of breath.   HEART: Pt denies: chest pain, arrhythmia, syncope, tachy or bradyarrhythmia.   GI: Pt denies: nausea, vomiting, diarrhea, constipation, melena, or hematochezia.   NEURO: Pt denies: seizures, strokes, diplopia, weakness, paraesthesias, or paralysis.   SKIN: Pt denies: itching, rashes, discoloration, or specific lesions of concern. Denies recent hair loss.   PSYCH: The patient denies significant depression, anxiety, mood imbalance. Specifically denies any suicidal ideation.              ENT: Pt denies: sore throat, but notes significant nasal congestion without: Epistaxis, difficulty swallowing, or changes in base-line hearing.                        No physical examination is performed as this is a virtual visit.  The patient's voice is strong, there is no evidence of labored breathing or wheezing.  The patient is alert and appropriate and demonstrates no obvious behavioral irregularities.       Decision Making       1. Acute recurrent maxillary sinusitis  Tylenol, fluids, rest as possible.  Continue Claritin  Consider adding nasal steroid upon conclusion of antibiotic  - doxycycline hyclate (VIBRAMYCIN) 100 MG capsule; Take 1 capsule (100 mg) by mouth 2 times daily  Dispense: 20 capsule; Refill: 0                                   FOLLOW UP   I have asked the patient to make an appointment for followup with me in August                I have carefully  explained the diagnosis and treatment options to the patient.  The patient has displayed an understanding of the above, and all subsequent questions were answered.      DO TIMMY Major    Portions of this note were produced using [employer]  Although every attempt at real-time proof reading has been made, occasional grammar/syntax errors may have been missed.      Telephone time: 16 minutes

## 2020-12-28 ENCOUNTER — MYC REFILL (OUTPATIENT)
Dept: FAMILY MEDICINE | Facility: CLINIC | Age: 70
End: 2020-12-28

## 2020-12-28 DIAGNOSIS — J01.01 ACUTE RECURRENT MAXILLARY SINUSITIS: ICD-10-CM

## 2020-12-28 RX ORDER — DOXYCYCLINE 100 MG/1
100 CAPSULE ORAL 2 TIMES DAILY
Qty: 20 CAPSULE | Refills: 0 | Status: SHIPPED | OUTPATIENT
Start: 2020-12-28 | End: 2021-02-05

## 2020-12-28 NOTE — TELEPHONE ENCOUNTER
Requested Prescriptions   Pending Prescriptions Disp Refills     doxycycline hyclate (VIBRAMYCIN) 100 MG capsule 20 capsule 0     Sig: Take 1 capsule (100 mg) by mouth 2 times daily     Last Written Prescription Date:  04/24/2020  Last Fill Quantity: 20,   # refills: 0  Last Office Visit: 04/24/2020  Future Office visit:       Routing refill request to provider for review/approval because:  Drug not on the [facility], [facility] or [facility] refill protocol or controlled substance    Stephanie Montes MA

## 2021-02-03 ENCOUNTER — MYC REFILL (OUTPATIENT)
Dept: FAMILY MEDICINE | Facility: CLINIC | Age: 71
End: 2021-02-03

## 2021-02-03 DIAGNOSIS — J01.01 ACUTE RECURRENT MAXILLARY SINUSITIS: ICD-10-CM

## 2021-02-03 RX ORDER — DOXYCYCLINE 100 MG/1
100 CAPSULE ORAL 2 TIMES DAILY
Qty: 20 CAPSULE | Refills: 0 | OUTPATIENT
Start: 2021-02-03

## 2021-02-03 NOTE — TELEPHONE ENCOUNTER
Doxycycline      Last Written Prescription Date:  12/28/2020  Last Fill Quantity: 20,   # refills: 0  Last Office Visit: 4/24/2020  Future Office visit:       Routing refill request to provider for review/approval because:  Drug not on the [facility], [facility] or [facility] refill protocol or controlled substance

## 2021-02-05 ENCOUNTER — OFFICE VISIT (OUTPATIENT)
Dept: INTERNAL MEDICINE | Facility: CLINIC | Age: 71
End: 2021-02-05
Payer: COMMERCIAL

## 2021-02-05 ENCOUNTER — APPOINTMENT (OUTPATIENT)
Dept: URGENT CARE | Facility: CLINIC | Age: 71
End: 2021-02-05
Payer: COMMERCIAL

## 2021-02-05 VITALS
DIASTOLIC BLOOD PRESSURE: 72 MMHG | SYSTOLIC BLOOD PRESSURE: 128 MMHG | HEIGHT: 73 IN | TEMPERATURE: 98.2 F | OXYGEN SATURATION: 96 % | HEART RATE: 78 BPM | BODY MASS INDEX: 38.04 KG/M2 | RESPIRATION RATE: 18 BRPM | WEIGHT: 287 LBS

## 2021-02-05 DIAGNOSIS — E78.5 HYPERLIPIDEMIA LDL GOAL <130: Primary | ICD-10-CM

## 2021-02-05 DIAGNOSIS — Z12.5 SCREENING FOR PROSTATE CANCER: ICD-10-CM

## 2021-02-05 DIAGNOSIS — Z00.00 MEDICARE ANNUAL WELLNESS VISIT, SUBSEQUENT: ICD-10-CM

## 2021-02-05 LAB
ALBUMIN SERPL-MCNC: 3.7 G/DL (ref 3.4–5)
ALP SERPL-CCNC: 93 U/L (ref 40–150)
ALT SERPL W P-5'-P-CCNC: 25 U/L (ref 0–70)
ANION GAP SERPL CALCULATED.3IONS-SCNC: 4 MMOL/L (ref 3–14)
AST SERPL W P-5'-P-CCNC: 18 U/L (ref 0–45)
BILIRUB DIRECT SERPL-MCNC: 0.2 MG/DL (ref 0–0.2)
BILIRUB SERPL-MCNC: 0.9 MG/DL (ref 0.2–1.3)
BUN SERPL-MCNC: 26 MG/DL (ref 7–30)
CALCIUM SERPL-MCNC: 8.8 MG/DL (ref 8.5–10.1)
CHLORIDE SERPL-SCNC: 109 MMOL/L (ref 94–109)
CHOLEST SERPL-MCNC: 163 MG/DL
CO2 SERPL-SCNC: 26 MMOL/L (ref 20–32)
CREAT SERPL-MCNC: 0.88 MG/DL (ref 0.66–1.25)
GFR SERPL CREATININE-BSD FRML MDRD: 87 ML/MIN/{1.73_M2}
GLUCOSE SERPL-MCNC: 101 MG/DL (ref 70–99)
HDLC SERPL-MCNC: 51 MG/DL
LDLC SERPL CALC-MCNC: 96 MG/DL
NONHDLC SERPL-MCNC: 112 MG/DL
POTASSIUM SERPL-SCNC: 4.3 MMOL/L (ref 3.4–5.3)
PROT SERPL-MCNC: 7.5 G/DL (ref 6.8–8.8)
PSA SERPL-ACNC: 1.79 UG/L (ref 0–4)
SODIUM SERPL-SCNC: 139 MMOL/L (ref 133–144)
TRIGL SERPL-MCNC: 82 MG/DL

## 2021-02-05 PROCEDURE — 80076 HEPATIC FUNCTION PANEL: CPT | Performed by: INTERNAL MEDICINE

## 2021-02-05 PROCEDURE — 80061 LIPID PANEL: CPT | Performed by: INTERNAL MEDICINE

## 2021-02-05 PROCEDURE — 80048 BASIC METABOLIC PNL TOTAL CA: CPT | Performed by: INTERNAL MEDICINE

## 2021-02-05 PROCEDURE — 99397 PER PM REEVAL EST PAT 65+ YR: CPT | Performed by: INTERNAL MEDICINE

## 2021-02-05 PROCEDURE — G0103 PSA SCREENING: HCPCS | Performed by: INTERNAL MEDICINE

## 2021-02-05 PROCEDURE — 36415 COLL VENOUS BLD VENIPUNCTURE: CPT | Performed by: INTERNAL MEDICINE

## 2021-02-05 ASSESSMENT — MIFFLIN-ST. JEOR: SCORE: 2120.7

## 2021-02-05 ASSESSMENT — PAIN SCALES - GENERAL: PAINLEVEL: NO PAIN (0)

## 2021-02-05 ASSESSMENT — ACTIVITIES OF DAILY LIVING (ADL): CURRENT_FUNCTION: NO ASSISTANCE NEEDED

## 2021-02-05 NOTE — PROGRESS NOTES
"SUBJECTIVE:   Jarrod Lewis is a 70 year old male who presents for Preventive Visit.    Patient has been advised of split billing requirements and indicates understanding: Yes   Are you in the first 12 months of your Medicare coverage?  No    Healthy Habits:     In general, how would you rate your overall health?  Good    Frequency of exercise:  2-3 days/week    Duration of exercise:  Other    Do you usually eat at least 4 servings of fruit and vegetables a day, include whole grains    & fiber and avoid regularly eating high fat or \"junk\" foods?  No    Taking medications regularly:  Yes    Barriers to taking medications:  None    Medication side effects:  None    Ability to successfully perform activities of daily living:  No assistance needed    Hearing Impairment:  Difficulty understanding soft or whispered speech    In the past 6 months, have you been bothered by leaking of urine? Yes    In general, how would you rate your overall mental or emotional health?  Excellent      PHQ-2 Total Score: 0    Additional concerns today:  Yes     Do you feel safe in your environment? Yes    Have you ever done Advance Care Planning? (For example, a Health Directive, POLST, or a discussion with a medical provider or your loved ones about your wishes): No, advance care planning information given to patient to review.  Patient plans to discuss their wishes with loved ones or provider.         Fall risk  Fallen 2 or more times in the past year?: Yes  Any fall with injury in the past year?: No  Timed Up and Go Test (>13.5 is fall risk; contact physician) : 10    Cognitive Screening   1) Repeat 3 items (Leader, Season, Table)   2) Clock draw:NORMAL  3) 3 item recall: Recalls 2 objects   Results: NORMAL clock, 1-2 items recalled: COGNITIVE IMPAIRMENT LESS LIKELY    Mini-CogTM Copyright RODERICK Amos. Licensed by the author for use in [facility]; reprinted with permission (madi@.[facility]). All rights reserved.        Reviewed " and updated as needed this visit by clinical staff  Tobacco  Allergies  Meds   Med Hx  Surg Hx  Fam Hx  Soc Hx        Reviewed and updated as needed this visit by Provider                Social History     Tobacco Use     Smoking status: Former Smoker     Types: Pipe     Quit date:      Years since quittin.1     Smokeless tobacco: Never Used   Substance Use Topics     Alcohol use: Yes     Alcohol/week: 0.0 standard drinks     Comment: occasional     If you drink alcohol do you typically have >3 drinks per day or >7 drinks per week? No    Alcohol Use 2/3/2020   Prescreen: >3 drinks/day or >7 drinks/week? No   Prescreen: >3 drinks/day or >7 drinks/week? -           -------------------------------------    Current providers sharing in care for this patient include:   Patient Care Team:  Adria Cowart DO as PCP - General (Internal Medicine)  Adria Cowart DO as Assigned PCP    The following health maintenance items are reviewed in Epic and correct as of today:  Health Maintenance   Topic Date Due     ZOSTER IMMUNIZATION (1 of 2) 2001     INFLUENZA VACCINE (1) 2020     FALL RISK ASSESSMENT  2021     MEDICARE ANNUAL WELLNESS VISIT  2021     DTAP/TDAP/TD IMMUNIZATION (3 - Td) 2024     LIPID  2025     ADVANCE CARE PLANNING  2025     COLORECTAL CANCER SCREENING  10/26/2026     HEPATITIS C SCREENING  Completed     PHQ-2  Completed     Pneumococcal Vaccine: 65+ Years  Completed     AORTIC ANEURYSM SCREENING (SYSTEM ASSIGNED)  Completed     Pneumococcal Vaccine: Pediatrics (0 to 5 Years) and At-Risk Patients (6 to 64 Years)  Aged Out     IPV IMMUNIZATION  Aged Out     MENINGITIS IMMUNIZATION  Aged Out     HEPATITIS B IMMUNIZATION  Aged Out     Lab work is in process  Labs reviewed in EPIC  BP Readings from Last 3 Encounters:   21 128/72   20 136/64   19 120/82    Wt Readings from Last 3 Encounters:   21 130.2 kg (287 lb)    20 128.6 kg (283 lb 8 oz)   19 126.6 kg (279 lb 2 oz)                  Patient Active Problem List   Diagnosis     CARDIOVASCULAR SCREENING; LDL GOAL LESS THAN 130     Reducible right inguinal hernia     GI bleed     Advanced directives, counseling/discussion     Allergic rhinitis due to dust mite     Sinus pressure     Seasonal allergic rhinitis due to pollen     Allergic rhinitis due to mold     Penicillin allergy     Upper GI bleed     Past Surgical History:   Procedure Laterality Date     C TOTAL KNEE ARTHROPLASTY Bilateral      COLONOSCOPY  2012    Procedure: COLONOSCOPY;  Colonoscopy;  Surgeon: Sylvester Lucas MD;  Location:  GI     COLONOSCOPY N/A 10/26/2016    Procedure: COMBINED COLONOSCOPY, SINGLE OR MULTIPLE BIOPSY/POLYPECTOMY BY BIOPSY;  Surgeon: Trev Oquendo MD;  Location:  GI     ESOPHAGOSCOPY, GASTROSCOPY, DUODENOSCOPY (EGD), COMBINED N/A 2017    Procedure: COMBINED ESOPHAGOSCOPY, GASTROSCOPY, DUODENOSCOPY (EGD), BIOPSY SINGLE OR MULTIPLE;  ESOPHAGOSCOPY, GASTROSCOPY, DUODENOSCOPY (EGD) with biopsies by forceps;  Surgeon: Puma Dominguez MD;  Location:  GI     ESOPHAGOSCOPY, GASTROSCOPY, DUODENOSCOPY (EGD), COMBINED N/A 2017    Procedure: COMBINED ESOPHAGOSCOPY, GASTROSCOPY, DUODENOSCOPY (EGD);  ESOPHAGOSCOPY, GASTROSCOPY, DUODENOSCOPY (EGD);  Surgeon: Sylvester Lucas MD;  Location:  GI     ESOPHAGOSCOPY, GASTROSCOPY, DUODENOSCOPY (EGD), COMBINED N/A 2019    Procedure: ESOPHAGOGASTRODUODENOSCOPY (EGD);  Surgeon: Artur Bruce MD;  Location:  GI     HERNIORRHAPHY INGUINAL Right 3/8/2017    Procedure: HERNIORRHAPHY INGUINAL;  Surgeon: Kong Lassiter MD;  Location: [facility]     JOINT REPLACEMENT, HIP RT/LT Right 2012     MOHS MICROGRAPHIC PROCEDURE Left 2017    Left cheek       Social History     Tobacco Use     Smoking status: Former Smoker     Types: Pipe     Quit date:      Years since quittin.1     Smokeless tobacco:  "Never Used   Substance Use Topics     Alcohol use: Yes     Alcohol/week: 0.0 standard drinks     Comment: occasional     Family History   Problem Relation Age of Onset     Stomach Cancer Father          at age 46         Current Outpatient Medications   Medication Sig Dispense Refill     acetaminophen (TYLENOL) 325 MG tablet Take 325-650 mg by mouth every 4 hours as needed for mild pain or fever Reported on 3/6/2017 100 tablet      loratadine (CLARITIN) 10 MG tablet Take 10 mg by mouth daily       Multiple Vitamins-Minerals (ZINC PO)        triamcinolone (KENALOG) 0.1 % external ointment Apply sparingly to affected area three times daily as needed. 15 g 1     Allergies   Allergen Reactions     Dust Mites      Penicillins Unknown     As a child       Zithromax [Azithromycin Dihydrate]      diarrhea     .    Review of Systems  CONSTITUTIONAL: NEGATIVE for fever, chills, change in weight  INTEGUMENTARY/SKIN: NEGATIVE for worrisome rashes, moles or lesions  EYES: NEGATIVE for vision changes or irritation  ENT/MOUTH: NEGATIVE for ear, mouth and throat problems  RESP: NEGATIVE for significant cough or SOB  BREAST: NEGATIVE for masses, tenderness or discharge  CV: NEGATIVE for chest pain, palpitations or peripheral edema  GI: NEGATIVE for nausea, abdominal pain, heartburn, or change in bowel habits  : NEGATIVE for frequency, dysuria, or hematuria  MUSCULOSKELETAL: NEGATIVE for significant arthralgias or myalgia  NEURO: NEGATIVE for weakness, dizziness or paresthesias  ENDOCRINE: NEGATIVE for temperature intolerance, skin/hair changes  HEME: NEGATIVE for bleeding problems  PSYCHIATRIC: NEGATIVE for changes in mood or affect    OBJECTIVE:   /72 (BP Location: Right arm, Patient Position: Sitting, Cuff Size: Adult Large)   Pulse 78   Temp 98.2  F (36.8  C) (Temporal)   Resp 18   Ht 1.854 m (6' 1\")   Wt 130.2 kg (287 lb)   SpO2 96%   BMI 37.87 kg/m   Estimated body mass index is 37.87 kg/m  as calculated from " "the following:    Height as of this encounter: 1.854 m (6' 1\").    Weight as of this encounter: 130.2 kg (287 lb).  Physical Exam  GENERAL: healthy, alert and no distress  EYES: Eyes grossly normal to inspection, PERRL and conjunctivae and sclerae normal  HENT: ear canals and TM's normal, nose and mouth without ulcers or lesions  NECK: no adenopathy, no asymmetry, masses, or scars and thyroid normal to palpation  RESP: lungs clear to auscultation - no rales, rhonchi or wheezes  CV: regular rate and rhythm, normal S1 S2, no S3 or S4, no murmur, click or rub, no peripheral edema and peripheral pulses strong  ABDOMEN: soft, nontender, no hepatosplenomegaly, no masses and bowel sounds normal  MS: no gross musculoskeletal defects noted, no edema  SKIN: no suspicious lesions or rashes  NEURO: Normal strength and tone, mentation intact and speech normal  PSYCH: mentation appears normal, affect normal/bright    Diagnostic Test Results:  No results found for this or any previous visit (from the past 24 hour(s)).    ASSESSMENT / PLAN:   1. Hyperlipidemia LDL goal <130    - Lipid panel reflex to direct LDL Fasting  - Basic metabolic panel  (Ca, Cl, CO2, Creat, Gluc, K, Na, BUN)  - Hepatic panel    2. Screening for prostate cancer    - PSA, screen    3. Medicare annual wellness visit, subsequent      Patient has been advised of split billing requirements and indicates understanding: Yes  COUNSELING:  Reviewed preventive health counseling, as reflected in patient instructions       Regular exercise       Healthy diet/nutrition       Vision screening       Hearing screening       Dental care       Colon cancer screening       Prostate cancer screening    Estimated body mass index is 37.87 kg/m  as calculated from the following:    Height as of this encounter: 1.854 m (6' 1\").    Weight as of this encounter: 130.2 kg (287 lb).    Weight management plan: Discussed healthy diet and exercise guidelines    He reports that he quit " smoking about 9 years ago. His smoking use included pipe. He has never used smokeless tobacco.      Appropriate preventive services were discussed with this patient, including applicable screening as appropriate for cardiovascular disease, diabetes, osteopenia/osteoporosis, and glaucoma.  As appropriate for age/gender, discussed screening for colorectal cancer, prostate cancer, breast cancer, and cervical cancer. Checklist reviewing preventive services available has been given to the patient.    Reviewed patients plan of care and provided an AVS. The Basic Care Plan (routine screening as documented in Health Maintenance) for Jarrod meets the Care Plan requirement. This Care Plan has been established and reviewed with the Patient.    Counseling Resources:  ATP IV Guidelines  Pooled Cohorts Equation Calculator  Breast Cancer Risk Calculator  Breast Cancer: Medication to Reduce Risk  FRAX Risk Assessment  ICSI Preventive Guidelines  Dietary Guidelines for Americans, 2010  USDA's MyPlate  ASA Prophylaxis  Lung CA Screening    Adria Cowart DO  [facility]    Identified Health Risks:

## 2021-02-08 ENCOUNTER — E-VISIT (OUTPATIENT)
Dept: INTERNAL MEDICINE | Facility: CLINIC | Age: 71
End: 2021-02-08
Payer: COMMERCIAL

## 2021-02-08 DIAGNOSIS — J01.90 ACUTE SINUSITIS WITH SYMPTOMS > 10 DAYS: Primary | ICD-10-CM

## 2021-02-08 PROCEDURE — 99421 OL DIG E/M SVC 5-10 MIN: CPT | Performed by: INTERNAL MEDICINE

## 2021-02-08 RX ORDER — DOXYCYCLINE HYCLATE 100 MG
100 TABLET ORAL 2 TIMES DAILY
Qty: 14 TABLET | Refills: 0 | Status: SHIPPED | OUTPATIENT
Start: 2021-02-08 | End: 2021-02-15

## 2021-02-08 NOTE — PATIENT INSTRUCTIONS
Dear Jarrod Lewis    After reviewing your responses, I've been able to diagnose you with?a sinus infection caused by bacteria.?     Based on your responses and diagnosis, I have prescribed doxycycline to treat your symptoms. I have sent this to your pharmacy.?     It is also important to stay well hydrated, get lots of rest and take over-the-counter decongestants,?tylenol?or ibuprofen if you?are able to?take those medications per your primary care provider to help relieve discomfort.?     It is important that you take?all of?your prescribed medication even if your symptoms are improving after a few doses.? Taking?all of?your medicine helps prevent the symptoms from returning.?     If your symptoms worsen, you develop severe headache, vomiting, high fever (>102), or are not improving in 7 days, please contact your primary care provider for an appointment or visit any of our convenient Walk-in Care or Urgent Care Centers to be seen which can be found on our website?here.?     Thanks again for choosing?us?as your health care partner,?   ?  Adria Cowart, DO?   You may want to try a nasal lavage (also known as nasal irrigation). You can find over-the-counter products, such as Neti-Pot, at retail locations or make your own at home. Instructions for homemade nasal lavage and more information on the process are available online at http://[email or web address].[email or web address].      When to Use Antibiotics    Antibiotics are medicines used to treat infections caused by bacteria. They don t work for an illness caused by a virus. And they don't work for an allergic reaction. In fact, taking antibiotics for reasons other than an infection by bacteria can cause problems. You may have side effects from the medicine. And if you need an antibiotic in the future, it may not work well. This is because the bacteria can become immune to the medicine. You can also get a type of diarrhea that's hard to treat. This  diarrhea is called C. diff.   When antibiotics likely won t help  Your healthcare provider won t usually give you antibiotics for the conditions listed below. You can help by not asking for them if you have:     A cold. This type of illness is caused by a virus. It can cause a runny nose, stuffed-up nose, sneezing, coughing, and headache. You may also have mild body aches and low fever. A cold gets better on its own in a few days to a week.    The flu (influenza). This is a respiratory illness caused by a virus. The flu usually goes away on its own in a week or so. It can cause fever, body aches, sore throat, and tiredness.    Bronchitis. This is an infection in the lungs. It is most often caused by a virus. You may have coughing, phlegm, body aches, and a low fever. A common type of bronchitis is known as a chest cold. This is called acute bronchitis. This often happens after you have a respiratory infection like a cold. Bronchitis can take weeks to go away. Antibiotics often don t help.    Most sore throats. Sore throats are most often caused by viruses. Your throat may feel scratchy or achy. It may hurt to swallow. You may also have a low fever and body aches. A sore throat usually gets better in a few days.    Most outer ear infections. An ear infection may be caused by a virus or bacteria. It causes pain in the ear. Antibiotics by mouth usually don t help. Low-dose antibiotic ear drops work much better.    Some inner ear infections. An inner ear infection (otitis media) can be caused by a virus in the ear. It can also cause pain and a high fever. Most older children with low-grade fever don't need to be treated with antibiotics.    Most sinus infections. This is also known as sinusitis. This kind of infection causes sinus pain and swelling, and a runny nose. In most cases, it goes away on its own. Antibiotics don t make recovery quicker.    Allergic rhinitis. This is a set of symptoms caused by an allergic  reaction. You may have sneezing, a runny nose, itchy or watery eyes, or a sore throat. Allergies are not treated with antibiotics.    Low fever. A mild fever that s less than 100.4 F (38 C) most likely doesn t need to be treated with antibiotics.   When antibiotics can help  Antibiotics can be used to treat:                                                       Strep throat. This is a throat infection caused by a certain type of bacteria. Symptoms of strep throat include a sore throat, white patches on the tonsils, red spots on the roof of the mouth, fever, body aches, and nausea and vomiting. Strep throat almost never causes a cough.    Urinary tract infection (UTI). This is an infection of the bladder and the tube that takes urine out of the body. It is caused by bacteria. It can cause burning pain and urine that s cloudy or tinted with blood. UTIs are very common. Antibiotics usually help treat them.    Some outer ear infections. In some cases, a healthcare provider may prescribe antibiotics by mouth for an ear infection. You may need a test to show the cause of the ear infection.    Some sinus infections. In some cases, your healthcare provider may give you antibiotics. He or she may first need to make sure your symptoms aren t caused by something else. This may be a virus, fungus, allergies, or air pollutants such as smoke.   Your healthcare provider may give you antibiotics if you have a condition that can affect your immune system. This includes diabetes or cancer.  Self-care at home  If your infection can t be treated with antibiotics, you can take other steps to feel better. Try the remedies below. In general:     Rest and sleep as much as needed.    Drink water and other clear fluids.    Don t smoke. Stay away from smoke from other people.    Use over-the-counter medicine such as acetaminophen or ibuprofen to ease pain or fever, as directed by your healthcare provider.  To treat sinus pain or nasal  stuffiness:    Put a warm, moist cloth on your face where you feel sinus pain or pressure.    Try a nasal spray with medicine or saline. Use as directed by your healthcare provider.    Breathe in steam from a hot shower.    Use a humidifier or cool mist vaporizer.   To quiet a cough:     Use a humidifier or cool mist vaporizer.    Breathe in steam from a hot shower.    Suck on cough lozenges.   To sooth a sore throat:     Suck on ice chips, frozen ice pops, or lozenges.    Use a sore throat spray.    Use a humidifier or cool mist vaporizer.    Gargle with saltwater.    Drink warm liquids.    Take ibuprofen to reduce swelling and pain.  To ease ear pain:     Hold a warm, moist washcloth on the ear for 10 minutes at a time.  [employer] last reviewed this educational content on 12/1/2019 2000-2020 [employer]. [address] [place] [address]. All rights reserved. This information is not intended as a substitute for professional medical care. Always follow your healthcare professional's instructions.          Sinusitis (Antibiotic Treatment)    The sinuses are air-filled spaces within the bones of the face. They connect to the inside of the nose. Sinusitis is an inflammation of the tissue that lines the sinuses. Sinusitis can occur during a cold. It can also happen due to allergies to pollens and other particles in the air. Sinusitis can cause symptoms of sinus congestion and a feeling of fullness. A sinus infection causes fever, headache, and facial pain. There is often green or yellow fluid draining from the nose or into the back of the throat (post-nasal drip). You have been given antibiotics to treat this condition.   Home care    Take the full course of antibiotics as instructed. Don't stop taking them, even when you feel better.    Drink plenty of water, hot tea, and other liquids as directed by the healthcare provider. This may help thin nasal mucus. It also may help your sinuses drain  fluids.    Heat may help soothe painful areas of your face. Use a towel soaked in hot water. Or,  the shower and direct the warm spray onto your face. Using a vaporizer along with a menthol rub at night may also help soothe symptoms.     An expectorant with guaifenesin may help thin nasal mucus and help your sinuses drain fluids. Talk with your provider or pharmacists before taking an over-the-counter (OTC) medicine if you have any questions about it or its side effects..    You can use an OTC decongestant, unless a similar medicine was prescribed to you. Nasal sprays work the fastest. Use one that contains phenylephrine or oxymetazoline. First blow your nose gently. Then use the spray. Don't use these medicines more often than directed on the label. If you do, your symptoms may get worse. You may also take pills that contain pseudoephedrine. Don t use products that combine multiple medicines. This is because side effects may be increased. Read labels. You can also ask the pharmacist for help. (People with high blood pressure should not use decongestants. They can raise blood pressure.) Talk with your provider or pharmacist if you have any questions about the medicine..    OTC antihistamines may help if allergies contributed to your sinusitis. Talk with your provider or pharmacist if you have any questions about the medicine..    Don't use nasal rinses or irrigation during an acute sinus infection, unless your healthcare provider tells you to. Rinsing may spread the infection to other areas in your sinuses.    Use acetaminophen or ibuprofen to control pain, unless another pain medicine was prescribed to you. If you have chronic liver or kidney disease or ever had a stomach ulcer, talk with your healthcare provider before using these medicines. Never give aspirin to anyone under age 18 who is ill with a fever. It may cause severe liver damage.    Don't smoke. This can make symptoms worse.    Follow-up  care  Follow up with your healthcare provider, or as advised.   When to seek medical advice  Call your healthcare provider if any of these occur:     Facial pain or headache that gets worse    Stiff neck    Unusual drowsiness or confusion    Swelling of your forehead or eyelids    Symptoms don't go away in 10 days    Vision problems, such as blurred or double vision    Fever of 100.4 F (38 C) or higher, or as directed by your healthcare provider  Call 911  Call 911 if any of these occur:     Seizure    Trouble breathing    Feeling dizzy or faint    Fingernails, skin or lips look blue, purple , or gray  Prevention  Here are steps you can take to help prevent an infection:     Keep good hand washing habits.    Don t have close contact with people who have sore throats, colds, or other upper respiratory infections.    Don t smoke, and stay away from secondhand smoke.    Stay up to date with of your vaccines.  [employer] last reviewed this educational content on 12/1/2019 2000-2020 [employer]. [address] [place] [address], [place] [id number]. All rights reserved. This information is not intended as a substitute for professional medical care. Always follow your healthcare professional's instructions.

## 2021-02-08 NOTE — TELEPHONE ENCOUNTER
Provider E-Visit time total (minutes): 6        DO TIMMY Major    Portions of this note were produced using [employer]  Although every attempt at real-time proof reading has been made, occasional grammar/syntax errors may have been missed.

## 2022-02-08 ENCOUNTER — OFFICE VISIT (OUTPATIENT)
Dept: INTERNAL MEDICINE | Facility: CLINIC | Age: 72
End: 2022-02-08
Payer: COMMERCIAL

## 2022-02-08 VITALS
TEMPERATURE: 98.5 F | RESPIRATION RATE: 18 BRPM | OXYGEN SATURATION: 98 % | HEIGHT: 73 IN | DIASTOLIC BLOOD PRESSURE: 88 MMHG | SYSTOLIC BLOOD PRESSURE: 136 MMHG | BODY MASS INDEX: 36.58 KG/M2 | WEIGHT: 276 LBS | HEART RATE: 92 BPM

## 2022-02-08 DIAGNOSIS — Z00.00 MEDICARE ANNUAL WELLNESS VISIT, SUBSEQUENT: Primary | ICD-10-CM

## 2022-02-08 DIAGNOSIS — Z12.5 SCREENING FOR PROSTATE CANCER: ICD-10-CM

## 2022-02-08 DIAGNOSIS — E78.5 HYPERLIPIDEMIA LDL GOAL <130: ICD-10-CM

## 2022-02-08 LAB
ALBUMIN UR-MCNC: NEGATIVE MG/DL
ANION GAP SERPL CALCULATED.3IONS-SCNC: 7 MMOL/L (ref 3–14)
APPEARANCE UR: CLEAR
BASOPHILS # BLD AUTO: 0.1 10E3/UL (ref 0–0.2)
BASOPHILS NFR BLD AUTO: 1 %
BILIRUB UR QL STRIP: ABNORMAL
BUN SERPL-MCNC: 25 MG/DL (ref 7–30)
CALCIUM SERPL-MCNC: 9.4 MG/DL (ref 8.5–10.1)
CHLORIDE BLD-SCNC: 109 MMOL/L (ref 94–109)
CHOLEST SERPL-MCNC: 137 MG/DL
CO2 SERPL-SCNC: 23 MMOL/L (ref 20–32)
COLOR UR AUTO: YELLOW
CREAT SERPL-MCNC: 0.95 MG/DL (ref 0.66–1.25)
EOSINOPHIL # BLD AUTO: 0.3 10E3/UL (ref 0–0.7)
EOSINOPHIL NFR BLD AUTO: 5 %
ERYTHROCYTE [DISTWIDTH] IN BLOOD BY AUTOMATED COUNT: 11.9 % (ref 10–15)
FASTING STATUS PATIENT QL REPORTED: YES
GFR SERPL CREATININE-BSD FRML MDRD: 86 ML/MIN/1.73M2
GLUCOSE BLD-MCNC: 100 MG/DL (ref 70–99)
GLUCOSE UR STRIP-MCNC: NEGATIVE MG/DL
HCT VFR BLD AUTO: 39.5 % (ref 40–53)
HDLC SERPL-MCNC: 50 MG/DL
HGB BLD-MCNC: 13.6 G/DL (ref 13.3–17.7)
HGB UR QL STRIP: NEGATIVE
HYALINE CASTS: 7 /LPF
IMM GRANULOCYTES # BLD: 0 10E3/UL
IMM GRANULOCYTES NFR BLD: 0 %
KETONES UR STRIP-MCNC: ABNORMAL MG/DL
LDLC SERPL CALC-MCNC: 73 MG/DL
LEUKOCYTE ESTERASE UR QL STRIP: NEGATIVE
LYMPHOCYTES # BLD AUTO: 1 10E3/UL (ref 0.8–5.3)
LYMPHOCYTES NFR BLD AUTO: 18 %
MCH RBC QN AUTO: 31.3 PG (ref 26.5–33)
MCHC RBC AUTO-ENTMCNC: 34.4 G/DL (ref 31.5–36.5)
MCV RBC AUTO: 91 FL (ref 78–100)
MONOCYTES # BLD AUTO: 0.6 10E3/UL (ref 0–1.3)
MONOCYTES NFR BLD AUTO: 10 %
MUCOUS THREADS #/AREA URNS LPF: PRESENT /LPF
NEUTROPHILS # BLD AUTO: 3.7 10E3/UL (ref 1.6–8.3)
NEUTROPHILS NFR BLD AUTO: 66 %
NITRATE UR QL: NEGATIVE
NONHDLC SERPL-MCNC: 87 MG/DL
NRBC # BLD AUTO: 0 10E3/UL
NRBC BLD AUTO-RTO: 0 /100
PH UR STRIP: 5.5 [PH] (ref 5–7)
PLATELET # BLD AUTO: 229 10E3/UL (ref 150–450)
POTASSIUM BLD-SCNC: 4 MMOL/L (ref 3.4–5.3)
PSA SERPL-MCNC: 1.82 UG/L (ref 0–4)
RBC # BLD AUTO: 4.35 10E6/UL (ref 4.4–5.9)
RBC URINE: <1 /HPF
SODIUM SERPL-SCNC: 139 MMOL/L (ref 133–144)
SP GR UR STRIP: >=1.03 (ref 1–1.03)
SQUAMOUS EPITHELIAL: <1 /HPF
TRIGL SERPL-MCNC: 70 MG/DL
UROBILINOGEN UR STRIP-MCNC: NORMAL MG/DL
WBC # BLD AUTO: 5.6 10E3/UL (ref 4–11)
WBC URINE: 2 /HPF

## 2022-02-08 PROCEDURE — G0103 PSA SCREENING: HCPCS | Performed by: INTERNAL MEDICINE

## 2022-02-08 PROCEDURE — 36415 COLL VENOUS BLD VENIPUNCTURE: CPT | Performed by: INTERNAL MEDICINE

## 2022-02-08 PROCEDURE — 99397 PER PM REEVAL EST PAT 65+ YR: CPT | Performed by: INTERNAL MEDICINE

## 2022-02-08 PROCEDURE — 81001 URINALYSIS AUTO W/SCOPE: CPT | Performed by: INTERNAL MEDICINE

## 2022-02-08 PROCEDURE — 80061 LIPID PANEL: CPT | Performed by: INTERNAL MEDICINE

## 2022-02-08 PROCEDURE — 80048 BASIC METABOLIC PNL TOTAL CA: CPT | Performed by: INTERNAL MEDICINE

## 2022-02-08 PROCEDURE — 85025 COMPLETE CBC W/AUTO DIFF WBC: CPT | Performed by: INTERNAL MEDICINE

## 2022-02-08 ASSESSMENT — ENCOUNTER SYMPTOMS
NERVOUS/ANXIOUS: 0
SORE THROAT: 0
ABDOMINAL PAIN: 0
FREQUENCY: 0
PALPITATIONS: 0
DYSURIA: 0
HEADACHES: 0
JOINT SWELLING: 0
MYALGIAS: 1
CONSTIPATION: 0
FEVER: 0
DIARRHEA: 0
CHILLS: 0
WEAKNESS: 0
SHORTNESS OF BREATH: 0
PARESTHESIAS: 0
NAUSEA: 0
HEMATOCHEZIA: 0
EYE PAIN: 0
DIZZINESS: 0
COUGH: 0
HEMATURIA: 0
ARTHRALGIAS: 1
HEARTBURN: 0

## 2022-02-08 ASSESSMENT — ACTIVITIES OF DAILY LIVING (ADL): CURRENT_FUNCTION: NO ASSISTANCE NEEDED

## 2022-02-08 ASSESSMENT — PAIN SCALES - GENERAL: PAINLEVEL: NO PAIN (0)

## 2022-02-08 ASSESSMENT — MIFFLIN-ST. JEOR: SCORE: 2065.81

## 2022-02-08 NOTE — PROGRESS NOTES
"SUBJECTIVE:   Jarrod Lewis is a 71 year old male who presents for Preventive Visit.    Are you in the first 12 months of your Medicare coverage?  No    Healthy Habits:     In general, how would you rate your overall health?  Good    Frequency of exercise:  None    Do you usually eat at least 4 servings of fruit and vegetables a day, include whole grains    & fiber and avoid regularly eating high fat or \"junk\" foods?  No    Taking medications regularly:  Yes    Medication side effects:  None    Ability to successfully perform activities of daily living:  No assistance needed    Home Safety:  No safety concerns identified    Hearing Impairment:  Difficulty following a conversation in a noisy restaurant or crowded room, difficult to understand a speaker at a public meeting or [employer] service, need to ask people to speak up or repeat themselves and difficulty understanding soft or whispered speech    In the past 6 months, have you been bothered by leaking of urine? Yes    In general, how would you rate your overall mental or emotional health?  Excellent      PHQ-2 Total Score: 0    Additional concerns today:  No    Do you feel safe in your environment? Yes    Have you ever done Advance Care Planning? (For example, a Health Directive, POLST, or a discussion with a medical provider or your loved ones about your wishes): No, advance care planning information given to patient to review.  Patient plans to discuss their wishes with loved ones or provider.         Fall risk  Fallen 2 or more times in the past year?: No  Any fall with injury in the past year?: No    Cognitive Screening   1) Repeat 3 items (Leader, Season, Table)    2) Clock draw: NORMAL  3) 3 item recall: Recalls 3 objects  Results: 3 items recalled: COGNITIVE IMPAIRMENT LESS LIKELY    Mini-CogTM Copyright RODERICK Amos. Licensed by the author for use in [facility]; reprinted with permission (madi@.[email or web address]). All rights reserved.          Reviewed " and updated as needed this visit by clinical staff  Tobacco  Allergies  Meds   Med Hx  Surg Hx  Fam Hx  Soc Hx       Reviewed and updated as needed this visit by Provider               Social History     Tobacco Use     Smoking status: Former Smoker     Types: Pipe     Quit date: 2012     Years since quitting: 10.1     Smokeless tobacco: Never Used   Substance Use Topics     Alcohol use: Yes     Alcohol/week: 0.0 standard drinks     Comment: occasional     If you drink alcohol do you typically have >3 drinks per day or >7 drinks per week? No    Alcohol Use 2/8/2022   Prescreen: >3 drinks/day or >7 drinks/week? No   Prescreen: >3 drinks/day or >7 drinks/week? -           -------------------------------------    Current providers sharing in care for this patient include:   Patient Care Team:  Adria Cowart DO as PCP - General (Internal Medicine)  Adria Cowart DO as Assigned PCP    The following health maintenance items are reviewed in Epic and correct as of today:  Health Maintenance Due   Topic Date Due     ANNUAL REVIEW OF HM ORDERS  Never done     ZOSTER IMMUNIZATION (1 of 2) Never done     LUNG CANCER SCREENING  03/28/2017     INFLUENZA VACCINE (1) Never done     FALL RISK ASSESSMENT  02/05/2022     MEDICARE ANNUAL WELLNESS VISIT  02/05/2022     Labs reviewed in EPIC  BP Readings from Last 3 Encounters:   02/08/22 136/88   02/05/21 128/72   02/03/20 136/64    Wt Readings from Last 3 Encounters:   02/08/22 125.2 kg (276 lb)   02/05/21 130.2 kg (287 lb)   02/03/20 128.6 kg (283 lb 8 oz)                  Patient Active Problem List   Diagnosis     CARDIOVASCULAR SCREENING; LDL GOAL LESS THAN 130     Reducible right inguinal hernia     GI bleed     Advanced directives, counseling/discussion     Allergic rhinitis due to dust mite     Sinus pressure     Seasonal allergic rhinitis due to pollen     Allergic rhinitis due to mold     Penicillin allergy     Upper GI bleed     Past Surgical  History:   Procedure Laterality Date     COLONOSCOPY  2012    Procedure: COLONOSCOPY;  Colonoscopy;  Surgeon: Sylvester Lucas MD;  Location: [facility]     COLONOSCOPY N/A 10/26/2016    Procedure: COMBINED COLONOSCOPY, SINGLE OR MULTIPLE BIOPSY/POLYPECTOMY BY BIOPSY;  Surgeon: Trev Oquendo MD;  Location: [facility]     ESOPHAGOSCOPY, GASTROSCOPY, DUODENOSCOPY (EGD), COMBINED N/A 2017    Procedure: COMBINED ESOPHAGOSCOPY, GASTROSCOPY, DUODENOSCOPY (EGD), BIOPSY SINGLE OR MULTIPLE;  ESOPHAGOSCOPY, GASTROSCOPY, DUODENOSCOPY (EGD) with biopsies by forceps;  Surgeon: Puma Dominguez MD;  Location: [facility]     ESOPHAGOSCOPY, GASTROSCOPY, DUODENOSCOPY (EGD), COMBINED N/A 2017    Procedure: COMBINED ESOPHAGOSCOPY, GASTROSCOPY, DUODENOSCOPY (EGD);  ESOPHAGOSCOPY, GASTROSCOPY, DUODENOSCOPY (EGD);  Surgeon: Sylvester Lucas MD;  Location:  GI     ESOPHAGOSCOPY, GASTROSCOPY, DUODENOSCOPY (EGD), COMBINED N/A 2019    Procedure: ESOPHAGOGASTRODUODENOSCOPY (EGD);  Surgeon: Artur Bruce MD;  Location: [facility]     HERNIORRHAPHY INGUINAL Right 3/8/2017    Procedure: HERNIORRHAPHY INGUINAL;  Surgeon: Kong Lassiter MD;  Location: [facility]     JOINT REPLACEMENT, HIP RT/LT Right 2012     MOHS MICROGRAPHIC PROCEDURE Left 2017    Left cheek     ZZC TOTAL KNEE ARTHROPLASTY Bilateral        Social History     Tobacco Use     Smoking status: Former Smoker     Types: Pipe     Quit date:      Years since quitting: 10.1     Smokeless tobacco: Never Used   Substance Use Topics     Alcohol use: Yes     Alcohol/week: 0.0 standard drinks     Comment: occasional     Family History   Problem Relation Age of Onset     Stomach Cancer Father          at age 46         Current Outpatient Medications   Medication Sig Dispense Refill     acetaminophen (TYLENOL) 325 MG tablet Take 325-650 mg by mouth every 4 hours as needed for mild pain or fever Reported on 3/6/2017 100 tablet      loratadine (CLARITIN) 10 MG  tablet Take 10 mg by mouth daily       Allergies   Allergen Reactions     Dust Mites      Penicillins Unknown     As a child       Zithromax [Azithromycin Dihydrate]      diarrhea             Review of Systems   Constitutional: Negative for chills and fever.   HENT: Positive for congestion and hearing loss. Negative for ear pain and sore throat.    Eyes: Negative for pain and visual disturbance.   Respiratory: Negative for cough and shortness of breath.    Cardiovascular: Negative for chest pain, palpitations and peripheral edema.   Gastrointestinal: Negative for abdominal pain, constipation, diarrhea, heartburn, hematochezia and nausea.   Genitourinary: Positive for urgency. Negative for dysuria, frequency, genital sores, hematuria and impotence.   Musculoskeletal: Positive for arthralgias and myalgias. Negative for joint swelling.   Neurological: Negative for dizziness, weakness, headaches and paresthesias.   Psychiatric/Behavioral: Negative for mood changes. The patient is not nervous/anxious.      CONSTITUTIONAL: NEGATIVE for fever, chills, change in weight  INTEGUMENTARY/SKIN: NEGATIVE for worrisome rashes, moles or lesions  EYES: NEGATIVE for vision changes or irritation  ENT/MOUTH: NEGATIVE for ear, mouth and throat problems  RESP: NEGATIVE for significant cough or SOB  CV: NEGATIVE for chest pain, palpitations or peripheral edema  GI: NEGATIVE for nausea, abdominal pain, heartburn, or change in bowel habits  : NEGATIVE for frequency, dysuria, or hematuria  MUSCULOSKELETAL: NEGATIVE for significant arthralgias or myalgia  NEURO: NEGATIVE for weakness, dizziness or paresthesias  ENDOCRINE: NEGATIVE for temperature intolerance, skin/hair changes  HEME: NEGATIVE for bleeding problems  PSYCHIATRIC: NEGATIVE for changes in mood or affect    OBJECTIVE:   /88 (BP Location: Right arm, Patient Position: Sitting, Cuff Size: Adult Large)   Pulse 92   Temp 98.5  F (36.9  C) (Temporal)   Resp 18   Ht 1.854 m  "(6' 1\")   Wt 125.2 kg (276 lb)   SpO2 98%   BMI 36.41 kg/m   Estimated body mass index is 36.41 kg/m  as calculated from the following:    Height as of this encounter: 1.854 m (6' 1\").    Weight as of this encounter: 125.2 kg (276 lb).  Physical Exam  GENERAL: healthy, alert and no distress  EYES: Eyes grossly normal to inspection, PERRL and conjunctivae and sclerae normal  HENT: ear canals and TM's normal, nose and mouth without ulcers or lesions  NECK: no adenopathy, no asymmetry, masses, or scars and thyroid normal to palpation  RESP: lungs clear to auscultation - no rales, rhonchi or wheezes  CV: regular rate and rhythm, normal S1 S2, no S3 or S4, no murmur, click or rub, no peripheral edema and peripheral pulses strong  ABDOMEN: soft, nontender, no hepatosplenomegaly, no masses and bowel sounds normal  MS: no gross musculoskeletal defects noted, no edema  SKIN: no suspicious lesions or rashes  NEURO: Normal strength and tone, mentation intact and speech normal  PSYCH: mentation appears normal, affect normal/bright    Diagnostic Test Results:  Results for orders placed or performed in visit on 02/08/22   Basic metabolic panel  (Ca, Cl, CO2, Creat, Gluc, K, Na, BUN)     Status: Abnormal   Result Value Ref Range    Sodium 139 133 - 144 mmol/L    Potassium 4.0 3.4 - 5.3 mmol/L    Chloride 109 94 - 109 mmol/L    Carbon Dioxide (CO2) 23 20 - 32 mmol/L    Anion Gap 7 3 - 14 mmol/L    Urea Nitrogen 25 7 - 30 mg/dL    Creatinine 0.95 0.66 - 1.25 mg/dL    Calcium 9.4 8.5 - 10.1 mg/dL    Glucose 100 (H) 70 - 99 mg/dL    GFR Estimate 86 >60 mL/min/1.73m2   PSA, screen     Status: Normal   Result Value Ref Range    Prostate Specific Antigen Screen 1.82 0.00 - 4.00 ug/L    Narrative    Assay Method:  Chemiluminescence using Siemens   Vista analyzer.   Lipid panel reflex to direct LDL Fasting     Status: None   Result Value Ref Range    Cholesterol 137 <200 mg/dL    Triglycerides 70 <150 mg/dL    Direct Measure HDL 50 >=40 " mg/dL    LDL Cholesterol Calculated 73 <=100 mg/dL    Non HDL Cholesterol 87 <130 mg/dL    Patient Fasting > 8hrs? Yes     Narrative    Cholesterol  Desirable:  <200 mg/dL    Triglycerides  Normal:  Less than 150 mg/dL  Borderline High:  150-199 mg/dL  High:  200-499 mg/dL  Very High:  Greater than or equal to 500 mg/dL    Direct Measure HDL  Female:  Greater than or equal to 50 mg/dL   Male:  Greater than or equal to 40 mg/dL    LDL Cholesterol  Desirable:  <100mg/dL  Above Desirable:  100-129 mg/dL   Borderline High:  130-159 mg/dL   High:  160-189 mg/dL   Very High:  >= 190 mg/dL    Non HDL Cholesterol  Desirable:  130 mg/dL  Above Desirable:  130-159 mg/dL  Borderline High:  160-189 mg/dL  High:  190-219 mg/dL  Very High:  Greater than or equal to 220 mg/dL   UA Macro with Reflex to Micro and Culture - lab collect     Status: Abnormal    Specimen: Urine, Midstream   Result Value Ref Range    Color Urine Yellow Colorless, Straw, Light Yellow, Yellow    Appearance Urine Clear Clear    Glucose Urine Negative Negative mg/dL    Bilirubin Urine Small (A) Negative    Ketones Urine Trace (A) Negative mg/dL    Specific Gravity Urine >=1.030 1.003 - 1.035    Blood Urine Negative Negative    pH Urine 5.5 5.0 - 7.0    Protein Albumin Urine Negative Negative mg/dL    Urobilinogen Urine Normal Normal, 2.0 mg/dL    Nitrite Urine Negative Negative    Leukocyte Esterase Urine Negative Negative    Mucus Urine Present (A) None Seen /LPF    RBC Urine <1 <=2 /HPF    WBC Urine 2 <=5 /HPF    Squamous Epithelials Urine <1 <=1 /HPF    Hyaline Casts Urine 7 (H) <=2 /LPF    Narrative    Microscopic not indicated  Urine Culture not indicated   CBC with platelets and differential     Status: Abnormal   Result Value Ref Range    WBC Count 5.6 4.0 - 11.0 10e3/uL    RBC Count 4.35 (L) 4.40 - 5.90 10e6/uL    Hemoglobin 13.6 13.3 - 17.7 g/dL    Hematocrit 39.5 (L) 40.0 - 53.0 %    MCV 91 78 - 100 fL    MCH 31.3 26.5 - 33.0 pg    MCHC 34.4 31.5 -  36.5 g/dL    RDW 11.9 10.0 - 15.0 %    Platelet Count 229 150 - 450 10e3/uL    % Neutrophils 66 %    % Lymphocytes 18 %    % Monocytes 10 %    % Eosinophils 5 %    % Basophils 1 %    % Immature Granulocytes 0 %    NRBCs per 100 WBC 0 <1 /100    Absolute Neutrophils 3.7 1.6 - 8.3 10e3/uL    Absolute Lymphocytes 1.0 0.8 - 5.3 10e3/uL    Absolute Monocytes 0.6 0.0 - 1.3 10e3/uL    Absolute Eosinophils 0.3 0.0 - 0.7 10e3/uL    Absolute Basophils 0.1 0.0 - 0.2 10e3/uL    Absolute Immature Granulocytes 0.0 <=0.4 10e3/uL    Absolute NRBCs 0.0 10e3/uL   CBC with platelets and differential     Status: Abnormal    Narrative    The following orders were created for panel order CBC with platelets and differential.  Procedure                               Abnormality         Status                     ---------                               -----------         ------                     CBC with platelets and d...[[id number]]  Abnormal            Final result                 Please view results for these tests on the individual orders.       ASSESSMENT / PLAN:       ICD-10-CM    1. Medicare annual wellness visit, subsequent  Z00.00 Basic metabolic panel  (Ca, Cl, CO2, Creat, Gluc, K, Na, BUN)     CBC with platelets and differential     UA Macro with Reflex to Micro and Culture - lab collect     Basic metabolic panel  (Ca, Cl, CO2, Creat, Gluc, K, Na, BUN)     CBC with platelets and differential     UA Macro with Reflex to Micro and Culture - lab collect   2. Screening for prostate cancer  Z12.5 PSA, screen     PSA, screen   3. Hyperlipidemia LDL goal <130  E78.5 Lipid panel reflex to direct LDL Fasting     Lipid panel reflex to direct LDL Fasting       Patient has been advised of split billing requirements and indicates understanding: Yes    COUNSELING:  Reviewed preventive health counseling, as reflected in patient instructions       Regular exercise       Healthy diet/nutrition       Vision screening       Hearing screening      "  Dental care       Bladder control       Colon cancer screening       Prostate cancer screening    Estimated body mass index is 36.41 kg/m  as calculated from the following:    Height as of this encounter: 1.854 m (6' 1\").    Weight as of this encounter: 125.2 kg (276 lb).    Weight management plan: Discussed healthy diet and exercise guidelines    He reports that he quit smoking about 10 years ago. His smoking use included pipe. He has never used smokeless tobacco.      Appropriate preventive services were discussed with this patient, including applicable screening as appropriate for cardiovascular disease, diabetes, osteopenia/osteoporosis, and glaucoma.  As appropriate for age/gender, discussed screening for colorectal cancer, prostate cancer, breast cancer, and cervical cancer. Checklist reviewing preventive services available has been given to the patient.    Reviewed patients plan of care and provided an AVS. The Basic Care Plan (routine screening as documented in Health Maintenance) for Jarrod meets the Care Plan requirement. This Care Plan has been established and reviewed with the Patient.    Counseling Resources:  ATP IV Guidelines  Pooled Cohorts Equation Calculator  Breast Cancer Risk Calculator  Breast Cancer: Medication to Reduce Risk  FRAX Risk Assessment  ICSI Preventive Guidelines  Dietary Guidelines for Americans, 2010  USDA's MyPlate  ASA Prophylaxis  Lung CA Screening    DO JUAN CARLOS Hernade[facility]    Identified Health Risks:  "

## 2022-07-01 ENCOUNTER — OFFICE VISIT (OUTPATIENT)
Dept: FAMILY MEDICINE | Facility: CLINIC | Age: 72
End: 2022-07-01
Payer: COMMERCIAL

## 2022-07-01 VITALS
SYSTOLIC BLOOD PRESSURE: 126 MMHG | OXYGEN SATURATION: 98 % | WEIGHT: 280 LBS | RESPIRATION RATE: 18 BRPM | BODY MASS INDEX: 36.94 KG/M2 | HEART RATE: 85 BPM | TEMPERATURE: 97.7 F | DIASTOLIC BLOOD PRESSURE: 80 MMHG

## 2022-07-01 DIAGNOSIS — J02.9 SORE THROAT: Primary | ICD-10-CM

## 2022-07-01 PROCEDURE — 99213 OFFICE O/P EST LOW 20 MIN: CPT | Performed by: FAMILY MEDICINE

## 2022-07-01 ASSESSMENT — PAIN SCALES - GENERAL: PAINLEVEL: MILD PAIN (2)

## 2022-07-01 NOTE — PROGRESS NOTES
Assessment & Plan       ICD-10-CM    1. Sore throat  J02.9           Several weeks of intermittent sore throat.  Worse in the morning.  I think likely due to postnasal drip.  Has some mild allergic symptoms as well.  Should try antihistamine for a few days and if not improved to move to a nasal steroid.    No follow-ups on file.    Stalin Kim MD  [facility] KATHRIN Garay is a 72 year old, presenting for the following health issues:  Pharyngitis      History of Present Illness       Reason for visit:  Sore throat  Symptom onset:  1-2 weeks ago  Symptoms include:  Sore throat  Symptom intensity:  Moderate  Symptom progression:  Worsening  Had these symptoms before:  No  What makes it worse:  No  What makes it better:  Yes    He eats 0-1 servings of fruits and vegetables daily.He consumes 0 sweetened beverage(s) daily.He exercises with enough effort to increase his heart rate 10 to 19 minutes per day.  He exercises with enough effort to increase his heart rate 3 or less days per week.   He is taking medications regularly.             Review of Systems         Objective    /80 (Cuff Size: Adult Large)   Pulse 85   Temp 97.7  F (36.5  C) (Temporal)   Resp 18   Wt 127 kg (280 lb)   SpO2 98%   BMI 36.94 kg/m    Body mass index is 36.94 kg/m .  Physical Exam   GENERAL: healthy, alert and no distress  EYES: Eyes grossly normal to inspection, PERRL and conjunctivae and sclerae normal  HENT: ear canals and TM's normal, nose and mouth without ulcers or lesions  NECK: no adenopathy, no asymmetry, masses, or scars and thyroid normal to palpation  RESP: lungs clear to auscultation - no rales, rhonchi or wheezes  CV: regular rate and rhythm, normal S1 S2, no S3 or S4, no murmur, click or rub, no peripheral edema and peripheral pulses strong  ABDOMEN: soft, nontender, no hepatosplenomegaly, no masses and bowel sounds normal  MS: no gross musculoskeletal defects noted, no  edema                    .  ..

## 2023-01-16 ENCOUNTER — LAB REQUISITION (OUTPATIENT)
Dept: LAB | Facility: CLINIC | Age: 73
End: 2023-01-16
Payer: COMMERCIAL

## 2023-01-16 DIAGNOSIS — M97.12XD PERIPROSTHETIC FRACTURE AROUND INTERNAL PROSTHETIC LEFT KNEE JOINT, SUBSEQUENT ENCOUNTER: ICD-10-CM

## 2023-01-17 LAB
ANION GAP SERPL CALCULATED.3IONS-SCNC: 3 MMOL/L (ref 3–14)
BUN SERPL-MCNC: 27 MG/DL (ref 7–30)
CALCIUM SERPL-MCNC: 8.7 MG/DL (ref 8.5–10.1)
CHLORIDE BLD-SCNC: 110 MMOL/L (ref 94–109)
CO2 SERPL-SCNC: 26 MMOL/L (ref 20–32)
CREAT SERPL-MCNC: 1.02 MG/DL (ref 0.66–1.25)
GFR SERPL CREATININE-BSD FRML MDRD: 79 ML/MIN/1.73M2
GLUCOSE BLD-MCNC: 103 MG/DL (ref 70–99)
HGB BLD-MCNC: 8.7 G/DL (ref 13.3–17.7)
POTASSIUM BLD-SCNC: 4.3 MMOL/L (ref 3.4–5.3)
SODIUM SERPL-SCNC: 139 MMOL/L (ref 133–144)

## 2023-01-17 PROCEDURE — P9603 ONE-WAY ALLOW PRORATED MILES: HCPCS | Performed by: NURSE PRACTITIONER

## 2023-01-17 PROCEDURE — 85018 HEMOGLOBIN: CPT | Performed by: NURSE PRACTITIONER

## 2023-01-17 PROCEDURE — 80048 BASIC METABOLIC PNL TOTAL CA: CPT | Performed by: NURSE PRACTITIONER

## 2023-01-17 PROCEDURE — 36415 COLL VENOUS BLD VENIPUNCTURE: CPT | Performed by: NURSE PRACTITIONER

## 2023-01-25 ENCOUNTER — TELEPHONE (OUTPATIENT)
Dept: INTERNAL MEDICINE | Facility: CLINIC | Age: 73
End: 2023-01-25
Payer: COMMERCIAL

## 2023-01-25 NOTE — TELEPHONE ENCOUNTER
Patient is calling and stated he was transferred to a triage nurse after requesting a follow up visit with PCP for 7-10 after 1/27/2023, scheduling unable to find available office visit.    He stated he had broken his leg on 1/4/2023 and had surgery the following day.    Patient verbalized that he is currently at [facility] in [place] for recovery treatment and will be released this Friday, 1/27/2023.  He stated he was instructed to schedule a follow up visit 7-10 days after being released from [facility].    Will forward to PCP for review.    Hazel Schmitt RN

## 2023-01-25 NOTE — TELEPHONE ENCOUNTER
"Please place the patient on my schedule in any available 20 minute time slot that is NOT listed as:   \"U N A V A I L A B L E\".    If a timely appointment is not available, please refer the patient to one of the many excellent [place] providers who might have an opening.    Thank you.    Supa"

## 2023-01-26 ENCOUNTER — TELEPHONE (OUTPATIENT)
Dept: INTERNAL MEDICINE | Facility: CLINIC | Age: 73
End: 2023-01-26
Payer: COMMERCIAL

## 2023-01-26 NOTE — TELEPHONE ENCOUNTER
[facility] calling:     - will provider follow patient and sign orders for HomeCare?    Laurie AQUINO @ [facility]  [phone] option 2  - confidential voicemail      .Aidee Cote XRO/

## 2023-01-27 NOTE — TELEPHONE ENCOUNTER
Adria Triplett, DO  You; [facility] 15 hours ago (5:05 PM)     CM  Sure.   [facility] notified.    Aidee Cote XRO/

## 2023-02-03 ENCOUNTER — OFFICE VISIT (OUTPATIENT)
Dept: INTERNAL MEDICINE | Facility: CLINIC | Age: 73
End: 2023-02-03
Payer: COMMERCIAL

## 2023-02-03 VITALS
OXYGEN SATURATION: 99 % | TEMPERATURE: 97.4 F | SYSTOLIC BLOOD PRESSURE: 124 MMHG | DIASTOLIC BLOOD PRESSURE: 80 MMHG | RESPIRATION RATE: 10 BRPM | WEIGHT: 275 LBS | BODY MASS INDEX: 36.28 KG/M2 | HEART RATE: 60 BPM

## 2023-02-03 DIAGNOSIS — D64.9 ANEMIA, UNSPECIFIED TYPE: ICD-10-CM

## 2023-02-03 DIAGNOSIS — M97.12XD PERIPROSTHETIC FRACTURE AROUND INTERNAL PROSTHETIC LEFT KNEE JOINT, SUBSEQUENT ENCOUNTER: Primary | ICD-10-CM
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
## 2023-02-03 LAB
ANION GAP SERPL CALCULATED.3IONS-SCNC: 11 MMOL/L (ref 7–15)
BUN SERPL-MCNC: 22 MG/DL (ref 8–23)
CALCIUM SERPL-MCNC: 8.9 MG/DL (ref 8.8–10.2)
CHLORIDE SERPL-SCNC: 102 MMOL/L (ref 98–107)
CREAT SERPL-MCNC: 0.92 MG/DL (ref 0.67–1.17)
DEPRECATED HCO3 PLAS-SCNC: 22 MMOL/L (ref 22–29)
ERYTHROCYTE [DISTWIDTH] IN BLOOD BY AUTOMATED COUNT: 12.8 % (ref 10–15)
GFR SERPL CREATININE-BSD FRML MDRD: 89 ML/MIN/1.73M2
GLUCOSE SERPL-MCNC: 102 MG/DL (ref 70–99)
HCT VFR BLD AUTO: 33.8 % (ref 40–53)
HGB BLD-MCNC: 11.1 G/DL (ref 13.3–17.7)
MCH RBC QN AUTO: 30.3 PG (ref 26.5–33)
MCHC RBC AUTO-ENTMCNC: 32.8 G/DL (ref 31.5–36.5)
MCV RBC AUTO: 92 FL (ref 78–100)
PLATELET # BLD AUTO: 234 10E3/UL (ref 150–450)
POTASSIUM SERPL-SCNC: 3.9 MMOL/L (ref 3.4–5.3)
RBC # BLD AUTO: 3.66 10E6/UL (ref 4.4–5.9)
SODIUM SERPL-SCNC: 135 MMOL/L (ref 136–145)
WBC # BLD AUTO: 3.8 10E3/UL (ref 4–11)

## 2023-02-03 PROCEDURE — 36415 COLL VENOUS BLD VENIPUNCTURE: CPT | Performed by: INTERNAL MEDICINE

## 2023-02-03 PROCEDURE — 85027 COMPLETE CBC AUTOMATED: CPT | Performed by: INTERNAL MEDICINE

## 2023-02-03 PROCEDURE — 80048 BASIC METABOLIC PNL TOTAL CA: CPT | Performed by: INTERNAL MEDICINE

## 2023-02-03 PROCEDURE — 99214 OFFICE O/P EST MOD 30 MIN: CPT | Performed by: INTERNAL MEDICINE

## 2023-02-03 ASSESSMENT — ENCOUNTER SYMPTOMS
RESPIRATORY NEGATIVE: 1
NEUROLOGICAL NEGATIVE: 1
MYALGIAS: 1
JOINT SWELLING: 1
CONSTITUTIONAL NEGATIVE: 1
GASTROINTESTINAL NEGATIVE: 1
CARDIOVASCULAR NEGATIVE: 1

## 2023-02-03 NOTE — PROGRESS NOTES
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    Weight as of this encounter: 124.7 kg (275 lb).   Weight management plan: Discussed healthy diet and exercise guidelines    Work on weight loss  Regular exercise    No follow-ups on file.    Adria Cowart DO  [facility] KATHRIN Garay is a 72 year old accompanied by his son, presenting for the following health issues:  Hospital F/U  s/p fall w/fracture and surgical repair on 1/5. Recently discharged from rehab and now receiving rehab services at home several times a week. States healing is overall improving but does endorse some lingering soreness to left knee. Wondering if a topical pain cream will help. Currently taking ES tylenol twice a day, elevation, and cold compress as needed.     History of Present Illness       Reason for visit:  Injury  Symptom onset:  More than a month  Symptoms include:  Broken leg  Symptom intensity:  Moderate  Symptom progression:  Improving  Had these symptoms before:  No    He eats 0-1 servings of fruits and vegetables daily.He consumes 0 sweetened beverage(s) daily.He exercises with enough effort to increase his heart rate 60 or more minutes per day.  He exercises with enough effort to increase his heart rate 3 or less days per week.   He is taking medications regularly.  Fall  This is a new " problem. The current episode started 1 to 4 weeks ago. The problem has been gradually improving. Associated symptoms include joint swelling and myalgias. Nothing aggravates the symptoms. He has tried acetaminophen and ice for the symptoms. The treatment provided moderate relief.          Hospital Follow-up Visit:    Hospital/Nursing Home/IP Rehab Facility: [facility]  Date of Admission:   Date of Discharge: 1/27/2023  Reason(s) for Admission:  Periprosthetic fracture     Was your hospitalization related to COVID-19? No   Problems taking medications regularly:  None  Medication changes since discharge: also on eloquis and flowmax   Problems adhering to non-medication therapy:  None    Summary of hospitalization:  CareEverywhere information obtained and reviewed  Diagnostic Tests/Treatments reviewed.  Follow up needed: none  Other Healthcare Providers Involved in Patient s Care:         None  Update since discharge: improved.   Plan of care communicated with patient and family           Review of Systems   Constitutional: Negative.    HENT: Negative.    Respiratory: Negative.    Cardiovascular: Negative.    Gastrointestinal: Negative.    Musculoskeletal: Positive for joint swelling and myalgias.   Neurological: Negative.           Objective    /80   Pulse 60   Temp 97.4  F (36.3  C)   Resp 10   Wt 124.7 kg (275 lb)   SpO2 99%   BMI 36.28 kg/m    There is no height or weight on file to calculate BMI.       Physical Exam  Constitutional:       General: He is not in acute distress.  Cardiovascular:      Rate and Rhythm: Normal rate and regular rhythm.      Heart sounds: Normal heart sounds.   Pulmonary:      Effort: Pulmonary effort is normal.      Breath sounds: Normal breath sounds.   Abdominal:      General: Bowel sounds are normal.      Palpations: Abdomen is soft.   Musculoskeletal:      Right knee: Normal.      Left knee: Swelling present. No deformity or erythema. Tenderness present.      Left  lower le+ Edema present.   Skin:     General: Skin is warm and dry.   Neurological:      Mental Status: He is alert.                X-ray and laboratory results are discussed with patient and his son    Adria patinoDO Barbara - [employer] Student

## 2023-02-06 ENCOUNTER — TELEPHONE (OUTPATIENT)
Dept: INTERNAL MEDICINE | Facility: CLINIC | Age: 73
End: 2023-02-06
Payer: COMMERCIAL

## 2023-02-06 NOTE — TELEPHONE ENCOUNTER
Reason for Call:  Form, our goal is to have forms completed with 72 hours, however, some forms may require a visit or additional information.    Type of letter, form or note:  Home Health Certification    Who is the form from?: Home care    Where did the form come from: form was faxed in    What clinic location was the form placed at?: [facility]    Where the form was placed: Given to VENICE Bear    What number is listed as a contact on the form?: [phone]       Additional comments: [facility]    Call taken on 2/6/2023 at 2:19 PM by Aidee Cote

## 2023-02-08 RX ORDER — TAMSULOSIN HYDROCHLORIDE 0.4 MG/1
0.4 CAPSULE ORAL DAILY
COMMUNITY
Start: 2023-01-27 | End: 2023-12-04

## 2023-02-08 RX ORDER — APIXABAN 2.5 MG/1
1 TABLET, FILM COATED ORAL
COMMUNITY
Start: 2023-01-28 | End: 2023-05-26

## 2023-02-08 NOTE — TELEPHONE ENCOUNTER
Medication reconciliation completed by RN. Form and chart forwarded to PCP for signatures.    Renate Berry RN on 2/8/2023 at 9:51 AM

## 2023-02-09 DIAGNOSIS — Z53.9 DIAGNOSIS NOT YET DEFINED: Primary | ICD-10-CM

## 2023-02-09 PROCEDURE — G0180 MD CERTIFICATION HHA PATIENT: HCPCS | Performed by: INTERNAL MEDICINE

## 2023-04-15 ENCOUNTER — HEALTH MAINTENANCE LETTER (OUTPATIENT)
Age: 73
End: 2023-04-15

## 2023-05-26 ENCOUNTER — OFFICE VISIT (OUTPATIENT)
Dept: INTERNAL MEDICINE | Facility: CLINIC | Age: 73
End: 2023-05-26
Payer: COMMERCIAL

## 2023-05-26 VITALS
HEIGHT: 72 IN | TEMPERATURE: 97.3 F | RESPIRATION RATE: 20 BRPM | BODY MASS INDEX: 37.94 KG/M2 | DIASTOLIC BLOOD PRESSURE: 70 MMHG | SYSTOLIC BLOOD PRESSURE: 122 MMHG | WEIGHT: 280.1 LBS | OXYGEN SATURATION: 97 % | HEART RATE: 68 BPM

## 2023-05-26 DIAGNOSIS — M97.12XD PERIPROSTHETIC FRACTURE AROUND INTERNAL PROSTHETIC LEFT KNEE JOINT, SUBSEQUENT ENCOUNTER: ICD-10-CM

## 2023-05-26 DIAGNOSIS — Z12.5 SCREENING FOR PROSTATE CANCER: ICD-10-CM

## 2023-05-26 DIAGNOSIS — D64.9 ANEMIA, UNSPECIFIED TYPE: ICD-10-CM

## 2023-05-26 DIAGNOSIS — J30.1 SEASONAL ALLERGIC RHINITIS DUE TO POLLEN: ICD-10-CM

## 2023-05-26 DIAGNOSIS — E78.5 HYPERLIPIDEMIA LDL GOAL <130: ICD-10-CM

## 2023-05-26 DIAGNOSIS — N40.1 BENIGN PROSTATIC HYPERPLASIA WITH URINARY FREQUENCY: ICD-10-CM

## 2023-05-26 DIAGNOSIS — R35.0 BENIGN PROSTATIC HYPERPLASIA WITH URINARY FREQUENCY: ICD-10-CM

## 2023-05-26 DIAGNOSIS — Z00.00 MEDICARE ANNUAL WELLNESS VISIT, SUBSEQUENT: Primary | ICD-10-CM

## 2023-05-26 LAB
ALBUMIN UR-MCNC: NEGATIVE MG/DL
ANION GAP SERPL CALCULATED.3IONS-SCNC: 9 MMOL/L (ref 7–15)
APPEARANCE UR: CLEAR
BILIRUB UR QL STRIP: NEGATIVE
BUN SERPL-MCNC: 25.8 MG/DL (ref 8–23)
CALCIUM SERPL-MCNC: 9.3 MG/DL (ref 8.8–10.2)
CHLORIDE SERPL-SCNC: 105 MMOL/L (ref 98–107)
CHOLEST SERPL-MCNC: 143 MG/DL
COLOR UR AUTO: YELLOW
CREAT SERPL-MCNC: 1 MG/DL (ref 0.67–1.17)
DEPRECATED HCO3 PLAS-SCNC: 23 MMOL/L (ref 22–29)
ERYTHROCYTE [DISTWIDTH] IN BLOOD BY AUTOMATED COUNT: 13 % (ref 10–15)
GFR SERPL CREATININE-BSD FRML MDRD: 80 ML/MIN/1.73M2
GLUCOSE SERPL-MCNC: 104 MG/DL (ref 70–99)
GLUCOSE UR STRIP-MCNC: NEGATIVE MG/DL
HCT VFR BLD AUTO: 38.1 % (ref 40–53)
HDLC SERPL-MCNC: 47 MG/DL
HGB BLD-MCNC: 12.8 G/DL (ref 13.3–17.7)
HGB UR QL STRIP: ABNORMAL
KETONES UR STRIP-MCNC: NEGATIVE MG/DL
LDLC SERPL CALC-MCNC: 84 MG/DL
LEUKOCYTE ESTERASE UR QL STRIP: NEGATIVE
MCH RBC QN AUTO: 28.9 PG (ref 26.5–33)
MCHC RBC AUTO-ENTMCNC: 33.6 G/DL (ref 31.5–36.5)
MCV RBC AUTO: 86 FL (ref 78–100)
MUCOUS THREADS #/AREA URNS LPF: PRESENT /LPF
NITRATE UR QL: NEGATIVE
NONHDLC SERPL-MCNC: 96 MG/DL
PH UR STRIP: 5 [PH] (ref 5–7)
PLATELET # BLD AUTO: 227 10E3/UL (ref 150–450)
POTASSIUM SERPL-SCNC: 4.4 MMOL/L (ref 3.4–5.3)
PSA SERPL DL<=0.01 NG/ML-MCNC: 1.17 NG/ML (ref 0–6.5)
RBC # BLD AUTO: 4.43 10E6/UL (ref 4.4–5.9)
RBC URINE: 1 /HPF
SODIUM SERPL-SCNC: 137 MMOL/L (ref 136–145)
SP GR UR STRIP: 1.01 (ref 1–1.03)
TRIGL SERPL-MCNC: 60 MG/DL
UROBILINOGEN UR STRIP-MCNC: NORMAL MG/DL
WBC # BLD AUTO: 4.1 10E3/UL (ref 4–11)
WBC URINE: <1 /HPF

## 2023-05-26 PROCEDURE — 85027 COMPLETE CBC AUTOMATED: CPT | Performed by: INTERNAL MEDICINE

## 2023-05-26 PROCEDURE — G0439 PPPS, SUBSEQ VISIT: HCPCS | Performed by: INTERNAL MEDICINE

## 2023-05-26 PROCEDURE — 80048 BASIC METABOLIC PNL TOTAL CA: CPT | Performed by: INTERNAL MEDICINE

## 2023-05-26 PROCEDURE — 36415 COLL VENOUS BLD VENIPUNCTURE: CPT | Performed by: INTERNAL MEDICINE

## 2023-05-26 PROCEDURE — 99213 OFFICE O/P EST LOW 20 MIN: CPT | Mod: 25 | Performed by: INTERNAL MEDICINE

## 2023-05-26 PROCEDURE — 80061 LIPID PANEL: CPT | Performed by: INTERNAL MEDICINE

## 2023-05-26 PROCEDURE — 81001 URINALYSIS AUTO W/SCOPE: CPT | Performed by: INTERNAL MEDICINE

## 2023-05-26 PROCEDURE — G0103 PSA SCREENING: HCPCS | Performed by: INTERNAL MEDICINE

## 2023-05-26 RX ORDER — FINASTERIDE 5 MG/1
5 TABLET, FILM COATED ORAL DAILY
Qty: 30 TABLET | Refills: 0 | Status: SHIPPED | OUTPATIENT
Start: 2023-05-26 | End: 2023-06-22

## 2023-05-26 RX ORDER — DOXAZOSIN 1 MG/1
TABLET ORAL
Qty: 70 TABLET | Refills: 0 | Status: SHIPPED | OUTPATIENT
Start: 2023-05-26 | End: 2023-06-22

## 2023-05-26 ASSESSMENT — ENCOUNTER SYMPTOMS
MYALGIAS: 0
ARTHRALGIAS: 1
SHORTNESS OF BREATH: 0
FEVER: 0
CHILLS: 0
PALPITATIONS: 0
NERVOUS/ANXIOUS: 0
DIARRHEA: 0
WEAKNESS: 0
EYE PAIN: 0
SORE THROAT: 0
DIZZINESS: 0
HEMATOCHEZIA: 0
COUGH: 0
DYSURIA: 0
NAUSEA: 0
HEMATURIA: 0
JOINT SWELLING: 1
FREQUENCY: 1
CONSTIPATION: 0
ABDOMINAL PAIN: 0
HEARTBURN: 0
PARESTHESIAS: 0
HEADACHES: 0

## 2023-05-26 ASSESSMENT — ACTIVITIES OF DAILY LIVING (ADL): CURRENT_FUNCTION: NO ASSISTANCE NEEDED

## 2023-05-26 ASSESSMENT — PAIN SCALES - GENERAL: PAINLEVEL: MILD PAIN (2)

## 2023-05-26 NOTE — PROGRESS NOTES
"SUBJECTIVE:   Jarrod is a 72 year old who presents for Preventive Visit.      5/26/2023     7:45 AM   Additional Questions   Roomed by Niya AQUINO         5/26/2023     7:45 AM   Patient Reported Additional Medications   Patient reports taking the following new medications Calcium, vitamin D,     Patient has been advised of split billing requirements and indicates understanding: Yes  Are you in the first 12 months of your Medicare coverage?  No    Healthy Habits:    In general, how would you rate your overall health?  Good    Frequency of exercise:  4-5 days/week    Duration of exercise:  30-45 minutes    Do you usually eat at least 4 servings of fruit and vegetables a day, include whole grains    & fiber and avoid regularly eating high fat or \"junk\" foods?  No    Taking medications regularly:  Yes    Medication side effects:  None    Ability to successfully perform activities of daily living:  No assistance needed    Home Safety:  No safety concerns identified    Hearing Impairment:  Need to ask people to speak up or repeat themselves and difficulty understanding soft or whispered speech    In the past 6 months, have you been bothered by leaking of urine? Yes    In general, how would you rate your overall mental or emotional health?  Good      PHQ-2 Total Score:    Additional concerns today:  No        Have you ever done Advance Care Planning? (For example, a Health Directive, POLST, or a discussion with a medical provider or your loved ones about your wishes): No, advance care planning information given to patient to review.  Patient plans to discuss their wishes with loved ones or provider.         Fall risk  Fallen 2 or more times in the past year?: Yes  Any fall with injury in the past year?: Yes    Cognitive Screening   1) Repeat 3 items (Leader, Season, Table)    2) Clock draw: NORMAL  3) 3 item recall: Recalls 2 objects   Results: NORMAL clock, 1-2 items recalled: COGNITIVE IMPAIRMENT LESS " LIKELY    Mini-CogTM Copyright RODERICK Amos. Licensed by the author for use in [facility]; reprinted with permission (mdai@[facility]). All rights reserved.      Do you have sleep apnea, excessive snoring or daytime drowsiness?: no    Reviewed and updated as needed this visit by clinical staff   Tobacco  Allergies  Meds              Reviewed and updated as needed this visit by Provider                 Social History     Tobacco Use     Smoking status: Former     Types: Pipe     Quit date:      Years since quittin.4     Smokeless tobacco: Never   Vaping Use     Vaping status: Never Used   Substance Use Topics     Alcohol use: Yes     Alcohol/week: 0.0 standard drinks of alcohol     Comment: occasional             2023     7:41 AM   Alcohol Use   Prescreen: >3 drinks/day or >7 drinks/week? Not Applicable     Do you have a current opioid prescription? No  Do you use any other controlled substances or medications that are not prescribed by a provider? None          -------------------------------------    Current providers sharing in care for this patient include:   Patient Care Team:  Adria Cowart DO as PCP - General (Internal Medicine)  Adria Cowart DO as Assigned PCP    The following health maintenance items are reviewed in Epic and correct as of today:  Health Maintenance   Topic Date Due     ZOSTER IMMUNIZATION (1 of 2) Never done     LUNG CANCER SCREENING  2017     COVID-19 Vaccine (4 - Moderna series) 2022     INFLUENZA VACCINE (1) Never done     MEDICARE ANNUAL WELLNESS VISIT  2023     ANNUAL REVIEW OF HM ORDERS  2023     DTAP/TDAP/TD IMMUNIZATION (3 - Td or Tdap) 2024     FALL RISK ASSESSMENT  2024     COLORECTAL CANCER SCREENING  10/26/2026     LIPID  2027     ADVANCE CARE PLANNING  2027     HEPATITIS C SCREENING  Completed     PHQ-2 (once per calendar year)  Completed     Pneumococcal Vaccine: 65+ Years   Completed     AORTIC ANEURYSM SCREENING (SYSTEM ASSIGNED)  Completed     IPV IMMUNIZATION  Aged Out     MENINGITIS IMMUNIZATION  Aged Out     Lab work is in process  Labs reviewed in EPIC  BP Readings from Last 3 Encounters:   05/26/23 122/70   02/03/23 124/80   07/01/22 126/80    Wt Readings from Last 3 Encounters:   05/26/23 127.1 kg (280 lb 1.6 oz)   02/03/23 124.7 kg (275 lb)   07/01/22 127 kg (280 lb)                  Patient Active Problem List   Diagnosis     CARDIOVASCULAR SCREENING; LDL GOAL LESS THAN 130     Reducible right inguinal hernia     GI bleed     Advanced directives, counseling/discussion     Allergic rhinitis due to dust mite     Sinus pressure     Seasonal allergic rhinitis due to pollen     Allergic rhinitis due to mold     Penicillin allergy     Upper GI bleed     Past Surgical History:   Procedure Laterality Date     COLONOSCOPY  12/14/2012    Procedure: COLONOSCOPY;  Colonoscopy;  Surgeon: Sylvester Lucas MD;  Location:  GI     COLONOSCOPY N/A 10/26/2016    Procedure: COMBINED COLONOSCOPY, SINGLE OR MULTIPLE BIOPSY/POLYPECTOMY BY BIOPSY;  Surgeon: Trev Oquendo MD;  Location:  GI     ESOPHAGOSCOPY, GASTROSCOPY, DUODENOSCOPY (EGD), COMBINED N/A 4/24/2017    Procedure: COMBINED ESOPHAGOSCOPY, GASTROSCOPY, DUODENOSCOPY (EGD), BIOPSY SINGLE OR MULTIPLE;  ESOPHAGOSCOPY, GASTROSCOPY, DUODENOSCOPY (EGD) with biopsies by forceps;  Surgeon: Puma Dominguez MD;  Location:  GI     ESOPHAGOSCOPY, GASTROSCOPY, DUODENOSCOPY (EGD), COMBINED N/A 6/12/2017    Procedure: COMBINED ESOPHAGOSCOPY, GASTROSCOPY, DUODENOSCOPY (EGD);  ESOPHAGOSCOPY, GASTROSCOPY, DUODENOSCOPY (EGD);  Surgeon: Sylvester Lucas MD;  Location:  GI     ESOPHAGOSCOPY, GASTROSCOPY, DUODENOSCOPY (EGD), COMBINED N/A 5/1/2019    Procedure: ESOPHAGOGASTRODUODENOSCOPY (EGD);  Surgeon: Artur Bruce MD;  Location:  GI     HERNIORRHAPHY INGUINAL Right 3/8/2017    Procedure: HERNIORRHAPHY INGUINAL;  Surgeon: Maikol  MD Kong;  Location: [facility]     JOINT REPLACEMENT, HIP RT/LT Right 2012     MOHS MICROGRAPHIC PROCEDURE Left 2017    Left cheek     ZZC TOTAL KNEE ARTHROPLASTY Bilateral        Social History     Tobacco Use     Smoking status: Former     Types: Pipe     Quit date:      Years since quittin.4     Smokeless tobacco: Never   Vaping Use     Vaping status: Never Used   Substance Use Topics     Alcohol use: Yes     Alcohol/week: 0.0 standard drinks of alcohol     Comment: occasional     Family History   Problem Relation Age of Onset     Stomach Cancer Father          at age 46         Current Outpatient Medications   Medication Sig Dispense Refill     acetaminophen (TYLENOL) 325 MG tablet Take 325-650 mg by mouth every 4 hours as needed for mild pain or fever Reported on 3/6/2017 100 tablet      doxazosin (CARDURA) 1 MG tablet Take 1 tablet (1 mg) by mouth daily for 7 days, THEN 2 tablets (2 mg) daily for 7 days, THEN 3 tablets (3 mg) daily for 7 days, THEN 4 tablets (4 mg) daily for 30 days. 70 tablet 0     finasteride (PROSCAR) 5 MG tablet Take 1 tablet (5 mg) by mouth daily 30 tablet 0     loratadine (CLARITIN) 10 MG tablet Take 10 mg by mouth daily       Multiple Vitamins-Minerals (VISION-ANTONIETA PRESERVE OR) Take 1 tablet by mouth daily (Patient not taking: Reported on 2023)       tamsulosin (FLOMAX) 0.4 MG capsule Take 0.4 mg by mouth daily (Patient not taking: Reported on 2023)       Allergies   Allergen Reactions     Dust Mites      Penicillins Unknown     As a child       Zithromax [Azithromycin Dihydrate]      diarrhea             Review of Systems   Constitutional: Negative for chills and fever.   HENT: Positive for hearing loss. Negative for congestion, ear pain and sore throat.    Eyes: Negative for pain and visual disturbance.   Respiratory: Negative for cough and shortness of breath.    Cardiovascular: Positive for peripheral edema. Negative for chest pain and palpitations.    Gastrointestinal: Negative for abdominal pain, constipation, diarrhea, heartburn, hematochezia and nausea.   Genitourinary: Positive for frequency. Negative for dysuria, genital sores, hematuria, impotence, penile discharge and urgency.   Musculoskeletal: Positive for arthralgias and joint swelling. Negative for myalgias.   Skin: Negative for rash.   Neurological: Negative for dizziness, weakness, headaches and paresthesias.   Psychiatric/Behavioral: Negative for mood changes. The patient is not nervous/anxious.      Patient does not knowledge some chronic hearing loss.    Patient has peripheral edema does resolve by morning.  Denies any orthopnea with this.    Frequency of urination is resolved benign prostatic hypertrophy.  Has occasional lateral awakenings number greater than 3.    Has chronic knee pain secondary to his recent fracture of his old surgical site.  He is currently going physical therapy post ORIF.  OBJECTIVE:   /70 (BP Location: Right arm, Patient Position: Chair)   Pulse 68   Temp 97.3  F (36.3  C) (Temporal)   Resp 20   Ht 1.829 m (6')   Wt 127.1 kg (280 lb 1.6 oz)   SpO2 97%   BMI 37.99 kg/m   Estimated body mass index is 37.99 kg/m  as calculated from the following:    Height as of this encounter: 1.829 m (6').    Weight as of this encounter: 127.1 kg (280 lb 1.6 oz).  Physical Exam  GENERAL: healthy, alert and no distress  EYES: Eyes grossly normal to inspection, PERRL and conjunctivae and sclerae normal  HENT: normal cephalic/atraumatic, ear canals and TM's normal, nose and mouth without ulcers or lesions, oropharynx clear, oral mucous membranes moist and nasal mucosa esophagitis, posterior nasal drainage is clear.  NECK: no adenopathy, no asymmetry, masses, or scars and thyroid normal to palpation  RESP: lungs clear to auscultation - no rales, rhonchi or wheezes  CV: regular rate and rhythm, normal S1 S2, no S3 or S4, no murmur, click or rub, no peripheral edema and peripheral  pulses strong  ABDOMEN: soft, nontender, no hepatosplenomegaly, no masses and bowel sounds normal  MS: Some tenderness around the left knee is noted.  Circumcision is well-healed.  Range of motion is mildly decreased due to discomfort  SKIN: no suspicious lesions or rashes  NEURO: Normal strength and tone, mentation intact and speech normal  PSYCH: mentation appears normal, affect normal/bright    Diagnostic Test Results:  Labs reviewed in Epic  Results for orders placed or performed in visit on 05/26/23   PSA, screen     Status: Normal   Result Value Ref Range    Prostate Specific Antigen Screen 1.17 0.00 - 6.50 ng/mL    Narrative    This result is obtained using the Roche Elecsys total PSA method on the ray e601 immunoassay analyzer. Results obtained with different assay methods or kits cannot be used interchangeably.   Lipid panel reflex to direct LDL Fasting     Status: Normal   Result Value Ref Range    Cholesterol 143 <200 mg/dL    Triglycerides 60 <150 mg/dL    Direct Measure HDL 47 >=40 mg/dL    LDL Cholesterol Calculated 84 <=100 mg/dL    Non HDL Cholesterol 96 <130 mg/dL    Narrative    Cholesterol  Desirable:  <200 mg/dL    Triglycerides  Normal:  Less than 150 mg/dL  Borderline High:  150-199 mg/dL  High:  200-499 mg/dL  Very High:  Greater than or equal to 500 mg/dL    Direct Measure HDL  Female:  Greater than or equal to 50 mg/dL   Male:  Greater than or equal to 40 mg/dL    LDL Cholesterol  Desirable:  <100mg/dL  Above Desirable:  100-129 mg/dL   Borderline High:  130-159 mg/dL   High:  160-189 mg/dL   Very High:  >= 190 mg/dL    Non HDL Cholesterol  Desirable:  130 mg/dL  Above Desirable:  130-159 mg/dL  Borderline High:  160-189 mg/dL  High:  190-219 mg/dL  Very High:  Greater than or equal to 220 mg/dL   UA Macroscopic with reflex to Microscopic and Culture     Status: Abnormal    Specimen: Urine, NOS   Result Value Ref Range    Color Urine Yellow Colorless, Straw, Light Yellow, Yellow     Appearance Urine Clear Clear    Glucose Urine Negative Negative mg/dL    Bilirubin Urine Negative Negative    Ketones Urine Negative Negative mg/dL    Specific Gravity Urine 1.013 1.003 - 1.035    Blood Urine Small (A) Negative    pH Urine 5.0 5.0 - 7.0    Protein Albumin Urine Negative Negative mg/dL    Urobilinogen Urine Normal Normal, 2.0 mg/dL    Nitrite Urine Negative Negative    Leukocyte Esterase Urine Negative Negative    Mucus Urine Present (A) None Seen /LPF    RBC Urine 1 <=2 /HPF    WBC Urine <1 <=5 /HPF    Narrative    Urine Culture not indicated   Basic metabolic panel  (Ca, Cl, CO2, Creat, Gluc, K, Na, BUN)     Status: Abnormal   Result Value Ref Range    Sodium 137 136 - 145 mmol/L    Potassium 4.4 3.4 - 5.3 mmol/L    Chloride 105 98 - 107 mmol/L    Carbon Dioxide (CO2) 23 22 - 29 mmol/L    Anion Gap 9 7 - 15 mmol/L    Urea Nitrogen 25.8 (H) 8.0 - 23.0 mg/dL    Creatinine 1.00 0.67 - 1.17 mg/dL    Calcium 9.3 8.8 - 10.2 mg/dL    Glucose 104 (H) 70 - 99 mg/dL    GFR Estimate 80 >60 mL/min/1.73m2   CBC with platelets     Status: Abnormal   Result Value Ref Range    WBC Count 4.1 4.0 - 11.0 10e3/uL    RBC Count 4.43 4.40 - 5.90 10e6/uL    Hemoglobin 12.8 (L) 13.3 - 17.7 g/dL    Hematocrit 38.1 (L) 40.0 - 53.0 %    MCV 86 78 - 100 fL    MCH 28.9 26.5 - 33.0 pg    MCHC 33.6 31.5 - 36.5 g/dL    RDW 13.0 10.0 - 15.0 %    Platelet Count 227 150 - 450 10e3/uL       ASSESSMENT / PLAN:       ICD-10-CM    1. Medicare annual wellness visit, subsequent  Z00.00 UA Macroscopic with reflex to Microscopic and Culture     UA Macroscopic with reflex to Microscopic and Culture      2. Periprosthetic fracture around internal prosthetic left knee joint, subsequent encounter  M97.12XD       3. Hyperlipidemia LDL goal <130  E78.5 Lipid panel reflex to direct LDL Fasting     Lipid panel reflex to direct LDL Fasting      4. Benign prostatic hyperplasia with urinary frequency  N40.1 finasteride (PROSCAR) 5 MG tablet    R35.0  doxazosin (CARDURA) 1 MG tablet      5. Seasonal allergic rhinitis due to pollen  J30.1       6. Anemia, unspecified type  D64.9 CBC with platelets     Basic metabolic panel  (Ca, Cl, CO2, Creat, Gluc, K, Na, BUN)     Basic metabolic panel  (Ca, Cl, CO2, Creat, Gluc, K, Na, BUN)     CBC with platelets      7. Screening for prostate cancer  Z12.5 PSA, screen     PSA, screen          Patient has been advised of split billing requirements and indicates understanding: Yes      COUNSELING:  Reviewed preventive health counseling, as reflected in patient instructions       Regular exercise       Healthy diet/nutrition       Vision screening       Hearing screening       Dental care       Colon cancer screening       Prostate cancer screening        He reports that he quit smoking about 11 years ago. His smoking use included pipe. He has never used smokeless tobacco.      Appropriate preventive services were discussed with this patient, including applicable screening as appropriate for cardiovascular disease, diabetes, osteopenia/osteoporosis, and glaucoma.  As appropriate for age/gender, discussed screening for colorectal cancer, prostate cancer, breast cancer, and cervical cancer. Checklist reviewing preventive services available has been given to the patient.    Reviewed patients plan of care and provided an AVS. The Basic Care Plan (routine screening as documented in Health Maintenance) for Jarrod meets the Care Plan requirement. This Care Plan has been established and reviewed with the Patient.        I have reviewed the patient's vaccination schedule and discussed the benefits of prophylactic vaccination in detail.  I recommend the patient contact their pharmacist for vaccinations.  Discussed that most insurance companies now favor reimbursement to the pharmacies and it will financially behoove the patient to have vaccinations performed at their pharmacy.      Adria Cowart, [facility]  KAT[place]IN    Identified Health Risks:    I have reviewed Opioid Use Disorder and Substance Use Disorder risk factors and made any needed referrals.

## 2023-05-29 ENCOUNTER — MYC REFILL (OUTPATIENT)
Dept: INTERNAL MEDICINE | Facility: CLINIC | Age: 73
End: 2023-05-29
Payer: COMMERCIAL

## 2023-05-29 RX ORDER — TAMSULOSIN HYDROCHLORIDE 0.4 MG/1
0.4 CAPSULE ORAL DAILY
Status: CANCELLED | OUTPATIENT
Start: 2023-05-29

## 2023-06-22 ENCOUNTER — MYC REFILL (OUTPATIENT)
Dept: INTERNAL MEDICINE | Facility: CLINIC | Age: 73
End: 2023-06-22
Payer: COMMERCIAL

## 2023-06-22 DIAGNOSIS — N40.1 BENIGN PROSTATIC HYPERPLASIA WITH URINARY FREQUENCY: ICD-10-CM

## 2023-06-22 DIAGNOSIS — R35.0 BENIGN PROSTATIC HYPERPLASIA WITH URINARY FREQUENCY: ICD-10-CM

## 2023-06-22 RX ORDER — FINASTERIDE 5 MG/1
5 TABLET, FILM COATED ORAL DAILY
Qty: 90 TABLET | Refills: 3 | Status: SHIPPED | OUTPATIENT
Start: 2023-06-22 | End: 2023-12-04

## 2023-06-22 NOTE — TELEPHONE ENCOUNTER
Prescription approved per [employer] Refill Protocol.  Renate Berry RN on 6/22/2023 at 5:40 PM

## 2023-06-22 NOTE — TELEPHONE ENCOUNTER
Pending Prescriptions:                       Disp   Refills    doxazosin (CARDURA) 1 MG tablet            70 tab*0        Sig: Take 1 tablet (1 mg) by mouth daily for 7 days, THEN           2 tablets (2 mg) daily for 7 days, THEN 3 tablets           (3 mg) daily for 7 days, THEN 4 tablets (4 mg)           daily for 30 days.    Signed Prescriptions:                        Disp   Refills    finasteride (PROSCAR) 5 MG tablet          90 tab*3        Sig: Take 1 tablet (5 mg) by mouth daily  Authorizing Provider: SAM RASHID  Ordering User: EZ BERRY      Routing refill request to provider for review/approval because:  Taper dose    Ez Berry RN on 6/22/2023 at 5:40 PM

## 2023-06-26 RX ORDER — DOXAZOSIN 1 MG/1
TABLET ORAL
Qty: 70 TABLET | Refills: 0 | Status: SHIPPED | OUTPATIENT
Start: 2023-06-26 | End: 2023-12-04

## 2023-09-01 ENCOUNTER — TRANSFERRED RECORDS (OUTPATIENT)
Dept: HEALTH INFORMATION MANAGEMENT | Facility: CLINIC | Age: 73
End: 2023-09-01

## 2023-09-29 ENCOUNTER — NURSE TRIAGE (OUTPATIENT)
Dept: INTERNAL MEDICINE | Facility: CLINIC | Age: 73
End: 2023-09-29
Payer: COMMERCIAL

## 2023-09-29 DIAGNOSIS — M25.662 KNEE STIFFNESS, LEFT: Primary | ICD-10-CM

## 2023-09-29 NOTE — TELEPHONE ENCOUNTER
Patient broke his left femur in 01/2023 and reports that healing has been going fine.    He is however experiencing locking below his left knee.  He has had this since 02/2023 and it is not getting any better.    Patient has been seeing orthopedics but is not fond of his orthopedist.  He also had an MRI done 3 weeks ago, where they found nothing wrong.      He denies pain when sitting.  When he is moving around and more active it seems to be better.  The pain/locking comes and goes.    Patient scheduled an appointment for 11/14/23 to follow up on this.    In the meantime he is wondering if you would recommend a good orthopedist?    RN reviewed red flag symptoms with patient and when to seek emergency care.   Patient agreed and verbalized understanding.      PROTOCOL: See In Office Within 2 Weeks    Reason for Disposition   Knee locking is a chronic symptom (recurrent or ongoing AND lasting > 4 weeks)    Additional Information   Negative: Sounds like a life-threatening emergency to the triager   Negative: Followed a knee injury   Negative: Swollen knee joint and fever   Negative: Patient sounds very sick or weak to the triager   Negative: Can't move swollen joint at all   Negative: Thigh or calf pain and only 1 side and present > 1 hour   Negative: Thigh or calf swelling and only 1 side   Negative: SEVERE pain (e.g., excruciating, unable to walk)   Negative: Very swollen knee joint   Negative: Painful rash with multiple small blisters grouped together (i.e., dermatomal distribution or 'band' or 'stripe')   Negative: Looks like a boil, infected sore, deep ulcer, or other infected rash (spreading redness, pus)   Negative: MODERATE pain (e.g., symptoms interfere with work or school, limping) and present > 3 days   Negative: Swollen knee joint (no fever or redness)   Negative: Fluid-filled sack just below knee cap  (no fever or redness)   Negative: MILD knee pain persists > 7 days   Negative: Patient wants to be seen    Negative: Knee pain is a chronic symptom (recurrent or ongoing AND lasting > 4 weeks)    Protocols used: Knee Pain-A-OH

## 2023-09-29 NOTE — TELEPHONE ENCOUNTER
"If the patient would like, I can place a referral for an available [place] orthopedist.  I cannot guarantee who that would be.  I also cannot guarantee that he would be \"more fond\" of that individual.    Please let me know if the patient would request orthopedic consultation through [place] provider.    Thank you.    Supa"

## 2023-10-02 ENCOUNTER — TELEPHONE (OUTPATIENT)
Dept: INTERNAL MEDICINE | Facility: CLINIC | Age: 73
End: 2023-10-02
Payer: COMMERCIAL

## 2023-10-02 NOTE — TELEPHONE ENCOUNTER
"  Forms/Letter Request    Type of form/letter:  Imaging Results    Have you been seen for this request: N/A    Do we have the form/letter: Yes: Patient brought it in    Who is the form from? Patient    Where did/will the form come from? Patient or family brought in       When is form/letter needed by: N/A    How would you like the form/letter returned: N/A    Patient Notified form requests are processed in 3-5 business days:Yes    Could we send this information to you in [employer] [facility] or would you prefer to receive a phone call?:   Patient would prefer a phone call   Okay to leave a detailed message?: Yes at Cell number on file:    Telephone Information:   Mobile [phone]        Patient \"would like a recommendation for next steps\"  "

## 2023-10-03 NOTE — TELEPHONE ENCOUNTER
"I do not really \"review films\" from other institutions.    The patient was last seen by me in May.    If you would like to make an appointment and explain his situation and ask my recommendations, please help him set that up.      Thank you.    Supa"

## 2023-10-10 NOTE — PROGRESS NOTES
HISTORY OF PRESENT ILLNESS    Jarrod Lewis is a 73 year old male who is seen as self referral for evaluation of  left knee pain  and stiffness.    HPI: Patient does have a history of distal femur fracture surrounding total knee replacement that he has followed [facility] for.   He had right total knee arthroplasty in 2007  underwent surgical intervention at an [facility] hospital on 01/04/2023, for a periprosthetic distal femur fracture   01/04/2023, for a periprosthetic distal femur fracture.  He had an atrophic nonunion.  used a bone stimulator and the fracture healed.  Still using it despite the fracture being declared healed.     Present symptoms:   Has had anterior pain since 1 month after the fracture surgery..    Knee locks up occasionally, unexpectedly.  With what activities:  unpredictable.can occur laying in bed or anytime or activity. .    Most of the time does well  Uses cane because knee feels like it will give out.  Can't trust the knee.     Treatments tried to this point: had physical therapy, early on, but on his own x 2 -3 months.   He had a greater trochanteric injection on 8/11/23  Was released from care 8/11 regarding his distal femur fracture, with documented appropriate range of motion of the knee and no loosening of the total knee arthroplasty seen. Complained of low back and hip pain at that appointment.       Other PMH:  has a past medical history of Arthritis, Duodenal ulcer, Gastric ulcer, and 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 (01/02/2017).    He has no past medical history of Cancer (H), Cerebral infarction (H), Complication of anesthesia, Congestive heart failure (H), COPD (chronic obstructive pulmonary disease) (H), Depressive disorder, Diabetes (H), Heart disease, History of blood transfusion, Hypertension, Malignant hyperthermia, Motion sickness, Obese, PONV (postoperative nausea and vomiting), Sleep apnea, Spinal headache, Thyroid disease, or  Uncomplicated asthma.    Surgical:  has a past surgical history that includes Colonoscopy (12/14/2012); Colonoscopy (N/A, 10/26/2016); Mohs micrographic procedure (Left, 01/02/2017); joint replacement, hip rt/lt (Right, 02/03/2012); TOTAL KNEE ARTHROPLASTY (Bilateral); Herniorrhaphy inguinal (Right, 3/8/2017); Esophagoscopy, gastroscopy, duodenoscopy (EGD), combined (N/A, 4/24/2017); Esophagoscopy, gastroscopy, duodenoscopy (EGD), combined (N/A, 6/12/2017); and Esophagoscopy, gastroscopy, duodenoscopy (EGD), combined (N/A, 5/1/2019).    Family Hx:  family history includes Stomach Cancer in his father.    Social Hx:  reports that he quit smoking about 11 years ago. His smoking use included pipe. He has never used smokeless tobacco. He reports current alcohol use. He reports that he does not use drugs.    REVIEW OF SYSTEMS:    CONSTITUTIONAL:  NEGATIVE for fever, chills, change in weight  INTEGUMENTARY/SKIN:  NEGATIVE for worrisome rashes, moles or lesions  EYES:  NEGATIVE for vision changes or irritation  ENT/MOUTH:  NEGATIVE for ear, mouth and throat problems  RESP:  NEGATIVE for significant cough or SOB  BREAST:  NEGATIVE for masses, tenderness or discharge  CV:  NEGATIVE for chest pain, palpitations or peripheral edema  GI:  NEGATIVE for nausea, abdominal pain, heartburn, or change in bowel habits  :  Negative   MUSCULOSKELETAL:  See HPI above  NEURO:  NEGATIVE for weakness, dizziness or paresthesias  ENDOCRINE:  NEGATIVE for temperature intolerance, skin/hair changes  HEME/ALLERGY/IMMUNE:  NEGATIVE for bleeding problems  PSYCHIATRIC:  NEGATIVE for changes in mood or affect    PHYSICAL EXAM:  BP (!) 179/89 (BP Location: Right arm, Patient Position: Sitting, Cuff Size: Adult Large)   Pulse 65   Ht 1.829 m (6')   Wt 127 kg (280 lb)   SpO2 99%   BMI 37.97 kg/m     GENERAL APPEARANCE: healthy, alert, and no distress   SKIN: no suspicious lesions or rashes  NEURO: Normal strength and tone, mentation intact, and  speech normal  VASCULAR:  good pulses, and cappillary refill   LYMPH: no lymphadenopathy   PSYCH:  mentation appears normal and affect normal/bright  RESP: no increased work of breathing     KNEE EXAM:   Gait: walks with antalgic gait favoring left side   Alignment: normal     Patellofemoral joint: significant crepitations in the patellofemoral joint. And surrounding tissues  Effusion: mild  ROM: 10 extension to 90 flexion  Tender: lateral joint line tenderness, and tender over the lateral epicondyle (prominent screw head?) distal femur   Masses: none  Ligaments:   Anterior and posterior drawer stable, stable to varus and valgus stress.  no pain with Varus/Valgus stress testing.    Lateral retinaculum is tight slightly  Facet Tenderness: lateral  Apprehension: negative      X-RAY:       TWO VIEWS LEFT KNEE 8/11/23  1.          Healed left periprosthetic femur fracture with retained   retrograde karely.    2.          Total knee arthroplasty in appropriate alignment without   definite signs of loosening.          MRI:  9/1/23        Impression:   Left knee pain and giving-way of left knee status post total knee arthroplasty 2007 and ppfx 10 months ago.   I think the fracture is healed, but can't be sure  He has a flexion contracture-- this is likely the main cause of the knee giving-way.    I can't tell on the MRI if there's a loose body. The pain can change location sometimes but is mainly anterolateral. Hasn't felt a loose body under the skin ever.     Plan:  CT with MARS ordered for the distal femur-- to see if the fracture is healed, and the knee to see if we can see a loose body in the knee, and if there is any loosening of the components.  Could consider a arthroscopy if loose body suspected strongly.  Physical therapy ordered to improve range of motion and for strengthening.    Return to clinic Follow up in the office / virtual visit/ MyChart for the results.      MARK Akhtar MD  Dept. Orthopedic  Surgery  [facility]

## 2023-10-11 ENCOUNTER — OFFICE VISIT (OUTPATIENT)
Dept: ORTHOPEDICS | Facility: CLINIC | Age: 73
End: 2023-10-11
Attending: INTERNAL MEDICINE
Payer: COMMERCIAL

## 2023-10-11 VITALS
HEIGHT: 72 IN | OXYGEN SATURATION: 99 % | HEART RATE: 65 BPM | SYSTOLIC BLOOD PRESSURE: 179 MMHG | WEIGHT: 280 LBS | BODY MASS INDEX: 37.93 KG/M2 | DIASTOLIC BLOOD PRESSURE: 89 MMHG

## 2023-10-11 DIAGNOSIS — M25.362 KNEE GIVES OUT, LEFT: ICD-10-CM

## 2023-10-11 DIAGNOSIS — M97.12XS PERIPROSTHETIC FRACTURE AROUND INTERNAL PROSTHETIC LEFT KNEE JOINT, SEQUELA: Primary | ICD-10-CM

## 2023-10-11 DIAGNOSIS — S72.492A OTHER CLOSED FRACTURE OF DISTAL END OF LEFT FEMUR, INITIAL ENCOUNTER (H): ICD-10-CM

## 2023-10-11 DIAGNOSIS — Z96.652 HISTORY OF TOTAL KNEE ARTHROPLASTY, LEFT: ICD-10-CM

## 2023-10-11 PROCEDURE — 99204 OFFICE O/P NEW MOD 45 MIN: CPT | Performed by: ORTHOPAEDIC SURGERY

## 2023-10-11 ASSESSMENT — PAIN SCALES - GENERAL: PAINLEVEL: NO PAIN (0)

## 2023-10-11 NOTE — LETTER
10/11/2023         RE: Jarrod Lewis  [address]  [place] [address]        Dear Colleague,    Thank you for referring your patient, Jarrod Lewis, to the [facility]. Please see a copy of my visit note below.    HISTORY OF PRESENT ILLNESS    Jarrod Lewis is a 73 year old male who is seen as self referral for evaluation of  left knee pain  and stiffness.    HPI: Patient does have a history of distal femur fracture surrounding total knee replacement that he has followed [facility] for.   He had right total knee arthroplasty in 2007  underwent surgical intervention at an [facility] hospital on 01/04/2023, for a periprosthetic distal femur fracture   01/04/2023, for a periprosthetic distal femur fracture.  He had an atrophic nonunion.  used a bone stimulator and the fracture healed.  Still using it despite the fracture being declared healed.     Present symptoms:   Has had anterior pain since 1 month after the fracture surgery..    Knee locks up occasionally, unexpectedly.  With what activities:  unpredictable.can occur laying in bed or anytime or activity. .    Most of the time does well  Uses cane because knee feels like it will give out.  Can't trust the knee.     Treatments tried to this point: had physical therapy, early on, but on his own x 2 -3 months.   He had a greater trochanteric injection on 8/11/23  Was released from care 8/11 regarding his distal femur fracture, with documented appropriate range of motion of the knee and no loosening of the total knee arthroplasty seen. Complained of low back and hip pain at that appointment.       Other PMH:  has a past medical history of Arthritis, Duodenal ulcer, Gastric ulcer, and 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 (01/02/2017).    He has no past medical history of Cancer (H), Cerebral infarction (H), Complication of anesthesia, Congestive heart failure (H), COPD (chronic obstructive pulmonary  disease) (H), Depressive disorder, Diabetes (H), Heart disease, History of blood transfusion, Hypertension, Malignant hyperthermia, Motion sickness, Obese, PONV (postoperative nausea and vomiting), Sleep apnea, Spinal headache, Thyroid disease, or Uncomplicated asthma.    Surgical:  has a past surgical history that includes Colonoscopy (12/14/2012); Colonoscopy (N/A, 10/26/2016); Mohs micrographic procedure (Left, 01/02/2017); joint replacement, hip rt/lt (Right, 02/03/2012); TOTAL KNEE ARTHROPLASTY (Bilateral); Herniorrhaphy inguinal (Right, 3/8/2017); Esophagoscopy, gastroscopy, duodenoscopy (EGD), combined (N/A, 4/24/2017); Esophagoscopy, gastroscopy, duodenoscopy (EGD), combined (N/A, 6/12/2017); and Esophagoscopy, gastroscopy, duodenoscopy (EGD), combined (N/A, 5/1/2019).    Family Hx:  family history includes Stomach Cancer in his father.    Social Hx:  reports that he quit smoking about 11 years ago. His smoking use included pipe. He has never used smokeless tobacco. He reports current alcohol use. He reports that he does not use drugs.    REVIEW OF SYSTEMS:    CONSTITUTIONAL:  NEGATIVE for fever, chills, change in weight  INTEGUMENTARY/SKIN:  NEGATIVE for worrisome rashes, moles or lesions  EYES:  NEGATIVE for vision changes or irritation  ENT/MOUTH:  NEGATIVE for ear, mouth and throat problems  RESP:  NEGATIVE for significant cough or SOB  BREAST:  NEGATIVE for masses, tenderness or discharge  CV:  NEGATIVE for chest pain, palpitations or peripheral edema  GI:  NEGATIVE for nausea, abdominal pain, heartburn, or change in bowel habits  :  Negative   MUSCULOSKELETAL:  See HPI above  NEURO:  NEGATIVE for weakness, dizziness or paresthesias  ENDOCRINE:  NEGATIVE for temperature intolerance, skin/hair changes  HEME/ALLERGY/IMMUNE:  NEGATIVE for bleeding problems  PSYCHIATRIC:  NEGATIVE for changes in mood or affect    PHYSICAL EXAM:  BP (!) 179/89 (BP Location: Right arm, Patient Position: Sitting, Cuff Size:  Adult Large)   Pulse 65   Ht 1.829 m (6')   Wt 127 kg (280 lb)   SpO2 99%   BMI 37.97 kg/m     GENERAL APPEARANCE: healthy, alert, and no distress   SKIN: no suspicious lesions or rashes  NEURO: Normal strength and tone, mentation intact, and speech normal  VASCULAR:  good pulses, and cappillary refill   LYMPH: no lymphadenopathy   PSYCH:  mentation appears normal and affect normal/bright  RESP: no increased work of breathing     KNEE EXAM:   Gait: walks with antalgic gait favoring left side   Alignment: normal     Patellofemoral joint: significant crepitations in the patellofemoral joint. And surrounding tissues  Effusion: mild  ROM: 10 extension to 90 flexion  Tender: lateral joint line tenderness, and tender over the lateral epicondyle (prominent screw head?) distal femur   Masses: none  Ligaments:   Anterior and posterior drawer stable, stable to varus and valgus stress.  no pain with Varus/Valgus stress testing.    Lateral retinaculum is tight slightly  Facet Tenderness: lateral  Apprehension: negative      X-RAY:       TWO VIEWS LEFT KNEE 8/11/23  1.          Healed left periprosthetic femur fracture with retained   retrograde karely.    2.          Total knee arthroplasty in appropriate alignment without   definite signs of loosening.          MRI:  9/1/23        Impression:   Left knee pain and giving-way of left knee status post total knee arthroplasty 2007 and ppfx 10 months ago.   I think the fracture is healed, but can't be sure  He has a flexion contracture-- this is likely the main cause of the knee giving-way.    I can't tell on the MRI if there's a loose body. The pain can change location sometimes but is mainly anterolateral. Hasn't felt a loose body under the skin ever.     Plan:  CT with MARS ordered for the distal femur-- to see if the fracture is healed, and the knee to see if we can see a loose body in the knee, and if there is any loosening of the components.  Could consider a arthroscopy if  loose body suspected strongly.  Physical therapy ordered to improve range of motion and for strengthening.    Return to clinic Follow up in the office / virtual visit/ MyChart for the results.      MARK Akhtar MD  Dept. Orthopedic Surgery  [facility]       Again, thank you for allowing me to participate in the care of your patient.        Sincerely,        Tawanda Akhtar MD

## 2023-10-16 ENCOUNTER — HOSPITAL ENCOUNTER (OUTPATIENT)
Dept: CT IMAGING | Facility: CLINIC | Age: 73
Discharge: HOME | End: 2023-10-16
Attending: ORTHOPAEDIC SURGERY | Admitting: ORTHOPAEDIC SURGERY
Payer: COMMERCIAL

## 2023-10-16 ENCOUNTER — THERAPY VISIT (OUTPATIENT)
Dept: PHYSICAL THERAPY | Facility: CLINIC | Age: 73
End: 2023-10-16
Attending: ORTHOPAEDIC SURGERY
Payer: COMMERCIAL

## 2023-10-16 DIAGNOSIS — Z96.652 HISTORY OF TOTAL KNEE ARTHROPLASTY, LEFT: ICD-10-CM

## 2023-10-16 DIAGNOSIS — M97.12XS PERIPROSTHETIC FRACTURE AROUND INTERNAL PROSTHETIC LEFT KNEE JOINT, SEQUELA: ICD-10-CM

## 2023-10-16 DIAGNOSIS — S72.492A OTHER CLOSED FRACTURE OF DISTAL END OF LEFT FEMUR, INITIAL ENCOUNTER (H): ICD-10-CM

## 2023-10-16 PROCEDURE — 97110 THERAPEUTIC EXERCISES: CPT | Mod: GP | Performed by: PHYSICAL THERAPIST

## 2023-10-16 PROCEDURE — 73700 CT LOWER EXTREMITY W/O DYE: CPT | Mod: LT

## 2023-10-16 PROCEDURE — 97161 PT EVAL LOW COMPLEX 20 MIN: CPT | Mod: GP | Performed by: PHYSICAL THERAPIST

## 2023-10-16 ASSESSMENT — ACTIVITIES OF DAILY LIVING (ADL)
SWELLING: THE SYMPTOM AFFECTS MY ACTIVITY SLIGHTLY
STIFFNESS: THE SYMPTOM AFFECTS MY ACTIVITY SLIGHTLY
HOW_WOULD_YOU_RATE_THE_CURRENT_FUNCTION_OF_YOUR_KNEE_DURING_YOUR_USUAL_DAILY_ACTIVITIES_ON_A_SCALE_FROM_0_TO_100_WITH_100_BEING_YOUR_LEVEL_OF_KNEE_FUNCTION_PRIOR_TO_YOUR_INJURY_AND_0_BEING_THE_INABILITY_TO_PERFORM_ANY_OF_YOUR_USUAL_DAILY_ACTIVITIES?: 60
KNEE_ACTIVITY_OF_DAILY_LIVING_SUM: 45
KNEE_ACTIVITY_OF_DAILY_LIVING_SCORE: 64.29
GIVING WAY, BUCKLING OR SHIFTING OF KNEE: I HAVE THE SYMPTOM BUT IT DOES NOT AFFECT MY ACTIVITY
LIMPING: THE SYMPTOM AFFECTS MY ACTIVITY MODERATELY
SIT WITH YOUR KNEE BENT: ACTIVITY IS NOT DIFFICULT
AS_A_RESULT_OF_YOUR_KNEE_INJURY,_HOW_WOULD_YOU_RATE_YOUR_CURRENT_LEVEL_OF_DAILY_ACTIVITY?: ABNORMAL
STAND: ACTIVITY IS MINIMALLY DIFFICULT
GO UP STAIRS: ACTIVITY IS SOMEWHAT DIFFICULT
WALK: ACTIVITY IS FAIRLY DIFFICULT
KNEEL ON THE FRONT OF YOUR KNEE: ACTIVITY IS MINIMALLY DIFFICULT
GO DOWN STAIRS: ACTIVITY IS SOMEWHAT DIFFICULT
RAW_SCORE: 45
WEAKNESS: THE SYMPTOM AFFECTS MY ACTIVITY SLIGHTLY
SQUAT: ACTIVITY IS FAIRLY DIFFICULT
PAIN: THE SYMPTOM AFFECTS MY ACTIVITY SLIGHTLY
HOW_WOULD_YOU_RATE_THE_OVERALL_FUNCTION_OF_YOUR_KNEE_DURING_YOUR_USUAL_DAILY_ACTIVITIES?: ABNORMAL
RISE FROM A CHAIR: ACTIVITY IS MINIMALLY DIFFICULT

## 2023-10-16 NOTE — PROGRESS NOTES
PHYSICAL THERAPY EVALUATION  Type of Visit: Evaluation    See electronic medical record for Abuse and Falls Screening details.    Subjective  Pt has a history of distal femur fracture surrounding total knee replacement prosthetic.  Fell on ice on 1/4/2023 and fractured the leg.  Followed up with [facility] and had surgery on 1/4/2023 for the fracture.  Had an atrophic nonunion and used a bone stimulator.  Did participate in PT at the time and was declared a couple months ago that the fracture was healed.  Discharged from care and PT about 2-3 months ago.  Pt complains of pain still and giving out in the L knee.  Pt is going to have further imaging to assess fracture site for healing and any potential loose bodies or loosening of prosthetic.  Stabbing pain below the knee cap.  Stepper machine on the gym and leg press working on more bending and straightening, leg extension machine.  Drives [employer].  Shooting for 2-3 times per week at the gym.  Has to get in/out of the semi leading with the R leg      Presenting condition or subjective complaint: L knee   Date of onset: 01/04/23    Relevant medical history:     Dates & types of surgery: 1/4/2023B knee TKA in 2007.  R RADHIKA, since the R hip is 1/4 inch shorter than L.    Prior diagnostic imaging/testing results: MRI; X-ray Having another MRI today   Prior therapy history for the same diagnosis, illness or injury: Yes Since 1/4/2023 on and off through June 2023    Prior Level of Function  Transfers: Independent  Ambulation: Independent  ADL: Independent  IADL: Driving, Finances, Housekeeping, Laundry, Meal preparation, Work, Yard work    Living Environment  Social support: Alone   Type of home: House   Stairs to enter the home: Yes 2 Is there a railing: Yes   Ramp: No   Stairs inside the home: Yes 10 Is there a railing: Yes   Help at home: None  Equipment owned: Straight Cane   Employment: Yes , flat bed.  Hobbies/Interests:    Patient goals for  therapy: Walk normally    Pain assessment: See objective evaluation for additional pain details     Objective   KNEE EVALUATION  PAIN: Pain Level at Rest: 0/10  Pain Level with Use: 1/10  INTEGUMENTARY (edema, incisions):  Superior patellar swelling today  POSTURE: Standing Posture: Rounded shoulders, Forward head  Sitting Posture: Rounded shoulders, Forward head  GAIT:  Weightbearing Status: WBAT  Assistive Device(s): Cane (single end), Using it less and less  Gait Deviations: Antalgic  BALANCE/PROPRIOCEPTION:  Feels less steady on uneven ground and on hills.  Requires use of the cane.  SL stance today: R 5 seconds, L requires UE support.  WEIGHTBEARING ALIGNMENT: Knee WB Alignment:Patella yvette L  ROM:   (Degrees) Left AROM Left PROM  Right AROM Right PROM   Knee Flexion 96 degs      Knee Extension 6 degs 3 degs with over pressure completed by PT     Pain: Lateral patella and patellar tendon L side.  STRENGTH:  L hip 4/5 abd and ext, L knee ext 3-/5, L plantar flexion 4/5.  R hip 4+/5 abd and ext.  FLEXIBILITY: Decreased hip IR L, Decreased hip flexors L, Decreased IT band L, Decreased quadriceps L, Decreased hamstrings L, Decreased hip IR R, Decreased hip flexors R  SPECIAL TESTS:    Left Right   Apley's (Meniscus) Negative  Negative    Sun's (Meniscus) Negative  Negative    Leland's (ITB/TFL)     Patellar Apprehension Test Negative  Negative    Patella Tracking Superior    Ligamentous Stability     Anterior Drawer (ACL) Negative,   Negative,     Posterior Drawer (PCL) Negative,   Negative,     Prone Dial Test at 30 Deg and 90 Deg (PCL/PLC)     Valgus Stress Testing at 0 Deg and 30 Deg Negative,   Negative,     Varus Stress Testing at 0 Deg and 30 Deg  Negative,   Negative,       FUNCTIONAL TESTS: SLS: Struggles, leaning over the hip laterally when attempting to stand on L LE.  PALPATION:   + Tenderness At Location Left Right   Medial Joint Line - -   Lateral Joint Line + -   Patellar Tendon + -   IT Band + -    Incisional     Popliteal     Biceps Femoris - -   Semitendinosis - -   Semimembranosis     Glut Medius - -   Patellar Medial - -   Patellar Lateral + -   Patellar Superior - -   Patellar Inferior  + -     JOINT MOBILITY:    Left Right   Tib-Fib Proximal WNL WNL   Patellofemoral Medial Hypomobile WNL   Patellofemoral Lateral WNL WNL   Patellofemoral Superior WNL WNL   Patellofemoral Inferior Hypomobile WNL            Assessment & Plan   CLINICAL IMPRESSIONS  Medical Diagnosis: Periprosthetic fracture around internal prosthetic left knee joint, sequela (M97.12XS); History of total knee arthroplasty, left (Z96.652)    Treatment Diagnosis: Decreased ROM, weakness, poor stability   Impression/Assessment: Patient is a 73 year old male with L knee pain, lack of mobility, and abnormal complaints.  The following significant findings have been identified: Pain, Decreased ROM/flexibility, Decreased joint mobility, Decreased strength, Impaired balance, Decreased proprioception, Impaired gait, and Impaired muscle performance. These impairments interfere with their ability to perform self care tasks, work tasks, recreational activities, household chores, household mobility, and community mobility as compared to previous level of function.     Clinical Decision Making (Complexity):  Clinical Presentation: Stable/Uncomplicated  Clinical Presentation Rationale: based on medical and personal factors listed in PT evaluation  Clinical Decision Making (Complexity): Low complexity    PLAN OF CARE  Treatment Interventions:  Interventions: Gait Training, Manual Therapy, Neuromuscular Re-education, Therapeutic Activity, Therapeutic Exercise    Long Term Goals     PT Goal 1  Goal Identifier: #1  Goal Description: Pt will demonstrate AROM of the L knee 0-115 degs in order to progress to functional activities.  Rationale: to maximize safety and independence with performance of ADLs and functional tasks;to maximize safety and independence  within the home;to maximize safety and independence within the community  Target Date: 11/27/23  PT Goal 2  Goal Identifier: #2  Goal Description: Pt will demonstrate 4+/5 of L quad MMT testing in order to demonstrate ability to complete more functional activities like stairs.  Rationale: to maximize safety and independence with performance of ADLs and functional tasks  Target Date: 11/27/23  PT Goal 3  Goal Identifier: #3  Goal Description: Pt will demonstrate ability to negotiate stairs with reciprocal pattern and no pain in the L knee.  Rationale: to maximize safety and independence within the home;to maximize safety and independence within the community  Target Date: 12/11/23  PT Goal 4  Goal Identifier: #4  Goal Description: Pt will demonstrate ability to walk without pain in the L knee and no assistive device on even and uneven surfaces.  Rationale: to maximize safety and independence within the community  Target Date: 12/11/23      Frequency of Treatment: 1x/week  Duration of Treatment: 8 weeks    Education Assessment:   Learner/Method: Patient;Listening;Reading;Demonstration;Pictures/Video;No Barriers to Learning  Education Comments: HEP via PTRx    Risks and benefits of evaluation/treatment have been explained.   Patient/Family/caregiver agrees with Plan of Care.     Evaluation Time:     PT Eval, Low Complexity Minutes (29766): 35       Signing Clinician: Leslye Tamez, PT      [facility]                                                                                   OUTPATIENT PHYSICAL THERAPY      PLAN OF TREATMENT FOR OUTPATIENT REHABILITATION   Patient's Last Name, First Name, Jarrod Esquivel YOB: 1950   Provider's Name   [facility]   Medical Record No.  [id number]     Onset Date: 01/04/23  Start of Care Date: 10/16/23     Medical Diagnosis:  Periprosthetic fracture around internal prosthetic left knee joint,  sequela (M97.12XS); History of total knee arthroplasty, left (Z96.652)      PT Treatment Diagnosis:  Decreased ROM, weakness, poor stability Plan of Treatment  Frequency/Duration: 1x/week/ 8 weeks    Certification date from 10/16/23 to 12/11/23         See note for plan of treatment details and functional goals     Leslye Tamez, PT                         I CERTIFY THE NEED FOR THESE SERVICES FURNISHED UNDER        THIS PLAN OF TREATMENT AND WHILE UNDER MY CARE     (Physician attestation of this document indicates review and certification of the therapy plan).                Referring Provider:  Tawanda Akhtar      Initial Assessment  See Epic Evaluation- Start of Care Date: 10/16/23

## 2023-10-23 ENCOUNTER — THERAPY VISIT (OUTPATIENT)
Dept: PHYSICAL THERAPY | Facility: CLINIC | Age: 73
End: 2023-10-23
Attending: ORTHOPAEDIC SURGERY
Payer: COMMERCIAL

## 2023-10-23 DIAGNOSIS — M97.12XS PERIPROSTHETIC FRACTURE AROUND INTERNAL PROSTHETIC LEFT KNEE JOINT, SEQUELA: Primary | ICD-10-CM

## 2023-10-23 DIAGNOSIS — Z96.652 HISTORY OF TOTAL KNEE ARTHROPLASTY, LEFT: ICD-10-CM

## 2023-10-23 PROCEDURE — 97110 THERAPEUTIC EXERCISES: CPT | Mod: GP | Performed by: PHYSICAL THERAPIST

## 2023-10-24 DIAGNOSIS — M97.12XS PERIPROSTHETIC FRACTURE AROUND INTERNAL PROSTHETIC LEFT KNEE JOINT, SEQUELA: Primary | ICD-10-CM

## 2023-11-03 ENCOUNTER — THERAPY VISIT (OUTPATIENT)
Dept: PHYSICAL THERAPY | Facility: CLINIC | Age: 73
End: 2023-11-03
Attending: ORTHOPAEDIC SURGERY
Payer: COMMERCIAL

## 2023-11-03 DIAGNOSIS — M97.12XS PERIPROSTHETIC FRACTURE AROUND INTERNAL PROSTHETIC LEFT KNEE JOINT, SEQUELA: Primary | ICD-10-CM

## 2023-11-03 DIAGNOSIS — Z96.652 HISTORY OF TOTAL KNEE ARTHROPLASTY, LEFT: ICD-10-CM

## 2023-11-03 PROCEDURE — 97110 THERAPEUTIC EXERCISES: CPT | Mod: GP | Performed by: PHYSICAL THERAPIST

## 2023-11-13 ENCOUNTER — THERAPY VISIT (OUTPATIENT)
Dept: PHYSICAL THERAPY | Facility: CLINIC | Age: 73
End: 2023-11-13
Attending: ORTHOPAEDIC SURGERY
Payer: COMMERCIAL

## 2023-11-13 DIAGNOSIS — Z96.652 HISTORY OF TOTAL KNEE ARTHROPLASTY, LEFT: ICD-10-CM

## 2023-11-13 DIAGNOSIS — M97.12XS PERIPROSTHETIC FRACTURE AROUND INTERNAL PROSTHETIC LEFT KNEE JOINT, SEQUELA: Primary | ICD-10-CM

## 2023-11-13 PROCEDURE — 97110 THERAPEUTIC EXERCISES: CPT | Mod: GP | Performed by: PHYSICAL THERAPIST

## 2023-11-20 ENCOUNTER — THERAPY VISIT (OUTPATIENT)
Dept: PHYSICAL THERAPY | Facility: CLINIC | Age: 73
End: 2023-11-20
Attending: ORTHOPAEDIC SURGERY
Payer: COMMERCIAL

## 2023-11-20 DIAGNOSIS — M97.12XS PERIPROSTHETIC FRACTURE AROUND INTERNAL PROSTHETIC LEFT KNEE JOINT, SEQUELA: Primary | ICD-10-CM

## 2023-11-20 DIAGNOSIS — Z96.652 HISTORY OF TOTAL KNEE ARTHROPLASTY, LEFT: ICD-10-CM

## 2023-11-20 PROCEDURE — 97140 MANUAL THERAPY 1/> REGIONS: CPT | Mod: GP | Performed by: PHYSICAL THERAPIST

## 2023-11-20 PROCEDURE — 97110 THERAPEUTIC EXERCISES: CPT | Mod: GP | Performed by: PHYSICAL THERAPIST

## 2023-11-21 ENCOUNTER — TELEPHONE (OUTPATIENT)
Dept: ORTHOPEDICS | Facility: CLINIC | Age: 73
End: 2023-11-21

## 2023-11-21 ENCOUNTER — OFFICE VISIT (OUTPATIENT)
Dept: ORTHOPEDICS | Facility: CLINIC | Age: 73
End: 2023-11-21
Attending: ORTHOPAEDIC SURGERY
Payer: COMMERCIAL

## 2023-11-21 ENCOUNTER — ANCILLARY PROCEDURE (OUTPATIENT)
Dept: GENERAL RADIOLOGY | Facility: CLINIC | Age: 73
End: 2023-11-21
Attending: ORTHOPAEDIC SURGERY
Payer: COMMERCIAL

## 2023-11-21 VITALS
HEART RATE: 80 BPM | RESPIRATION RATE: 20 BRPM | WEIGHT: 280 LBS | DIASTOLIC BLOOD PRESSURE: 93 MMHG | HEIGHT: 72 IN | SYSTOLIC BLOOD PRESSURE: 176 MMHG | BODY MASS INDEX: 37.93 KG/M2

## 2023-11-21 DIAGNOSIS — T84.84XA PAINFUL ORTHOPAEDIC HARDWARE (H): ICD-10-CM

## 2023-11-21 DIAGNOSIS — M97.12XS PERIPROSTHETIC FRACTURE AROUND INTERNAL PROSTHETIC LEFT KNEE JOINT, SEQUELA: ICD-10-CM

## 2023-11-21 DIAGNOSIS — M97.12XS PERIPROSTHETIC FRACTURE AROUND INTERNAL PROSTHETIC LEFT KNEE JOINT, SEQUELA: Primary | ICD-10-CM

## 2023-11-21 PROCEDURE — 99214 OFFICE O/P EST MOD 30 MIN: CPT | Performed by: ORTHOPAEDIC SURGERY

## 2023-11-21 PROCEDURE — 73562 X-RAY EXAM OF KNEE 3: CPT | Mod: TC | Performed by: STUDENT IN AN ORGANIZED HEALTH CARE EDUCATION/TRAINING PROGRAM

## 2023-11-21 NOTE — PROGRESS NOTES
Jarrod Lewis is a 73 year old male who is seen in consultation at the request of Dr. Alyssa Akhtar  for pain in left knee after repair of periprosthetic fracture.  He had a left total knee arthroplasty placed in 2007.  He did well until January 2023 when he sustained a fracture just above the total knee.  He had a Scott retrograde nail placed on January 5, 2023 at [facility].  He did use a bone stimulator because of lack of early healing.  He has continued to have some pain in the knee despite apparent healing of the distal femur fracture.      Dr. Akhtar had ordered a metal reduction CT scan which shows prominence of the oblique screws distally at the karely.  The 2 transverse screws appear in position and flush with the bone.  New x-ray was obtained showing good apparent healing of the distal femur fracture.    Past Medical History:   Diagnosis Date    Arthritis     Duodenal ulcer     Gastric ulcer     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 01/02/2017    Left cheek       Past Surgical History:   Procedure Laterality Date    COLONOSCOPY  12/14/2012    Procedure: COLONOSCOPY;  Colonoscopy;  Surgeon: Sylvester Lucas MD;  Location: [facility]    COLONOSCOPY N/A 10/26/2016    Procedure: COMBINED COLONOSCOPY, SINGLE OR MULTIPLE BIOPSY/POLYPECTOMY BY BIOPSY;  Surgeon: Trev Oquendo MD;  Location: [facility]    ESOPHAGOSCOPY, GASTROSCOPY, DUODENOSCOPY (EGD), COMBINED N/A 4/24/2017    Procedure: COMBINED ESOPHAGOSCOPY, GASTROSCOPY, DUODENOSCOPY (EGD), BIOPSY SINGLE OR MULTIPLE;  ESOPHAGOSCOPY, GASTROSCOPY, DUODENOSCOPY (EGD) with biopsies by forceps;  Surgeon: Puma Dominguez MD;  Location: [facility]    ESOPHAGOSCOPY, GASTROSCOPY, DUODENOSCOPY (EGD), COMBINED N/A 6/12/2017    Procedure: COMBINED ESOPHAGOSCOPY, GASTROSCOPY, DUODENOSCOPY (EGD);  ESOPHAGOSCOPY, GASTROSCOPY, DUODENOSCOPY (EGD);  Surgeon: Sylvester Lucas MD;  Location: [facility]    ESOPHAGOSCOPY, GASTROSCOPY, DUODENOSCOPY  (EGD), COMBINED N/A 2019    Procedure: ESOPHAGOGASTRODUODENOSCOPY (EGD);  Surgeon: Artur Bruce MD;  Location: [facility]    HERNIORRHAPHY INGUINAL Right 3/8/2017    Procedure: HERNIORRHAPHY INGUINAL;  Surgeon: Kong Lassiter MD;  Location: [facility]    JOINT REPLACEMENT, HIP RT/LT Right 2012    MOHS MICROGRAPHIC PROCEDURE Left 2017    Left cheek    ZZC TOTAL KNEE ARTHROPLASTY Bilateral        Family History   Problem Relation Age of Onset    Stomach Cancer Father          at age 46       Social History     Socioeconomic History    Marital status: Single     Spouse name: Not on file    Number of children: Not on file    Years of education: Not on file    Highest education level: Not on file   Occupational History    Occupation:    Tobacco Use    Smoking status: Former     Types: Pipe     Quit date:      Years since quittin.8    Smokeless tobacco: Never   Vaping Use    Vaping Use: Never used   Substance and Sexual Activity    Alcohol use: Yes     Alcohol/week: 0.0 standard drinks of alcohol     Comment: occasional    Drug use: No    Sexual activity: Yes     Partners: Female   Other Topics Concern    Parent/sibling w/ CABG, MI or angioplasty before 65F 55M? Not Asked   Social History Narrative    Not on file     Social Determinants of Health     Financial Resource Strain: Not on file   Food Insecurity: Not on file   Transportation Needs: Not on file   Physical Activity: Not on file   Stress: Not on file   Social Connections: Not on file   Interpersonal Safety: Not on file   Housing Stability: Not on file       Current Outpatient Medications   Medication Sig Dispense Refill    acetaminophen (TYLENOL) 325 MG tablet Take 325-650 mg by mouth every 4 hours as needed for mild pain or fever Reported on 3/6/2017 100 tablet     doxazosin (CARDURA) 1 MG tablet Take 1 tablet (1 mg) by mouth daily for 7 days, THEN 2 tablets (2 mg) daily for 7 days, THEN 3 tablets (3 mg) daily for 7 days,  THEN 4 tablets (4 mg) daily for 30 days. 70 tablet 0    finasteride (PROSCAR) 5 MG tablet Take 1 tablet (5 mg) by mouth daily 90 tablet 3    loratadine (CLARITIN) 10 MG tablet Take 10 mg by mouth daily      Multiple Vitamins-Minerals (VISION-ANTONIETA PRESERVE OR) Take 1 tablet by mouth daily (Patient not taking: Reported on 5/26/2023)      tamsulosin (FLOMAX) 0.4 MG capsule Take 0.4 mg by mouth daily (Patient not taking: Reported on 5/26/2023)         Allergies   Allergen Reactions    Dust Mites     Hydrocodone-Acetaminophen Other (See Comments)     nightmares    No Clinical Screening - See Comments Other (See Comments)    Penicillins Unknown     As a child      Zithromax [Azithromycin Dihydrate]      diarrhea       REVIEW OF SYSTEMS:  CONSTITUTIONAL:  NEGATIVE for fever, chills, change in weight, not feeling tired  SKIN:  NEGATIVE for worrisome rashes, no skin lumps, no skin ulcers and no non-healing wounds  EYES:  NEGATIVE for vision changes or irritation.  ENT/MOUTH:  NEGATIVE.  No hearing loss, no hoarseness, no difficulty swallowing.  RESP:  NEGATIVE. No cough or shortness of breath.  CV:  NEGATIVE for chest pain, palpitations or peripheral edema  GI:  NEGATIVE for nausea, abdominal pain, heartburn, or change in bowel habits  :  Negative. No dysuria, no hematuria  MUSCULOSKELETAL:  See HPI above  NEURO:  NEGATIVE . No headaches, no dizziness,  no numbness  ENDOCRINE:  NEGATIVE for temperature intolerance, skin/hair changes  HEME/ALLERGY/IMMUNE:  NEGATIVE for bleeding problems  PSYCHIATRIC:  NEGATIVE. no anxiety, no depression.     Exam:  Vitals: BP (!) 176/93   Pulse 80   Resp 20   Ht 1.829 m (6')   Wt 127 kg (280 lb)   BMI 37.97 kg/m    BMI= Body mass index is 37.97 kg/m .  Constitutional:  healthy, alert and no distress  Neuro: Alert and Oriented x 3, no focal defects.  Psych: Affect normal   Respiratory: Breathing not labored.  Cardiovascular: normal peripheral pulses  Lymph: no adenopathy  Skin: No  rashes,worrisome lesions or skin problems  His left knee shows range of motion from about 10 to 95 degrees.  He feels he is making progress with therapy with flexion.  He does have significant amount of crepitation in the knee with range of motion.  I can feel the 2 prominent screws just to medial and lateral side of the patella and anterior femoral flange.  There is significant crepitation over each of the screws.  He has good stability of MCL and LCL.  There is a small effusion.      Assessment:  1. Left total knee arthroplasty in good position from 2007.  2.  Left femoral periprosthetic fracture which appears to be healed well after placement of a retrograde nail in January 2023..  3.  There are definitely prominent screws at the distal femur just medial and just lateral to the anterior femoral flange.  There is considerable crepitation and pain over these.  4.  Decreased motion with both flexion contracture and lack of full flexion of the left knee.  He does not think he had this limitation prior to his fracture.    Plan: We discussed possible removal of the screws to assist with pain relief of the knee.  I feel the 2 oblique screws are the most problematic and the easiest to remove.  This could be done with local block directly over the screws with a MAC anesthesia.  To get out the 2 transverse screws would require either C arm or opening up the knee.  They appear flush, so I feel they are less important to remove.  We also discussed possibility of synovectomy to see if this would improve his motion.  It would also have the possibility of making it worse depending upon how he responded to that surgery.  After thorough discussion of his options we have decided to remove just the 2 oblique screws and work on motion with therapy after this.  This will be done with MAC plus local anesthetic

## 2023-11-21 NOTE — PATIENT INSTRUCTIONS
Jarrod to follow up with Primary Care provider regarding elevated blood pressure.    Surgery:  left knee screw removal     Preop physical with primary physician is needed within 30 days of the surgery.  Nothing to eat or drink for 8 hours before surgery.  For same day surgery you need a ride.  Someone should stay with you for 12 hours afterward.  Stop blood thinners 5 days before surgery (aspirin, warfarin, anti-inflammatories).      Surgery schedulers will call you to schedule surgery.    If you don't get a call in the next few days, then call [phone] to schedule your surgery for [place] or [phone] to schedule for [place].

## 2023-11-21 NOTE — TELEPHONE ENCOUNTER
Type of surgery: REMOVAL, screws from left  KNEE (Left)   Location of surgery: [facility]  Date and time of surgery: 1/26  Surgeon: Chyna  Pre-Op Appt Date: 12/26  Post-Op Appt Date: 2/6   Packet sent out: Yes  Pre-cert/Authorization completed:  Not Applicable  Date: na

## 2023-11-21 NOTE — LETTER
11/21/2023         RE: Jarrod Lewis  [address]  [facility] [id number]        Dear Colleague,    Thank you for referring your patient, Jarrod Lewis, to the [facility]. Please see a copy of my visit note below.    Jarrod Lewis is a 73 year old male who is seen in consultation at the request of Dr. Alyssa Akhtar  for pain in left knee after repair of periprosthetic fracture.  He had a left total knee arthroplasty placed in 2007.  He did well until January 2023 when he sustained a fracture just above the total knee.  He had a Scott retrograde nail placed on January 5, 2023 at [facility].  He did use a bone stimulator because of lack of early healing.  He has continued to have some pain in the knee despite apparent healing of the distal femur fracture.      Dr. Akhtar had ordered a metal reduction CT scan which shows prominence of the oblique screws distally at the karely.  The 2 transverse screws appear in position and flush with the bone.  New x-ray was obtained showing good apparent healing of the distal femur fracture.    Past Medical History:   Diagnosis Date     Arthritis      Duodenal ulcer      Gastric ulcer      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 01/02/2017    Left cheek       Past Surgical History:   Procedure Laterality Date     COLONOSCOPY  12/14/2012    Procedure: COLONOSCOPY;  Colonoscopy;  Surgeon: Sylvester Lucas MD;  Location:  GI     COLONOSCOPY N/A 10/26/2016    Procedure: COMBINED COLONOSCOPY, SINGLE OR MULTIPLE BIOPSY/POLYPECTOMY BY BIOPSY;  Surgeon: Trev Oquendo MD;  Location:  GI     ESOPHAGOSCOPY, GASTROSCOPY, DUODENOSCOPY (EGD), COMBINED N/A 4/24/2017    Procedure: COMBINED ESOPHAGOSCOPY, GASTROSCOPY, DUODENOSCOPY (EGD), BIOPSY SINGLE OR MULTIPLE;  ESOPHAGOSCOPY, GASTROSCOPY, DUODENOSCOPY (EGD) with biopsies by forceps;  Surgeon: Puma Dominguez MD;  Location:  GI     ESOPHAGOSCOPY, GASTROSCOPY,  DUODENOSCOPY (EGD), COMBINED N/A 2017    Procedure: COMBINED ESOPHAGOSCOPY, GASTROSCOPY, DUODENOSCOPY (EGD);  ESOPHAGOSCOPY, GASTROSCOPY, DUODENOSCOPY (EGD);  Surgeon: Sylvester Lucas MD;  [facility]     ESOPHAGOSCOPY, GASTROSCOPY, DUODENOSCOPY (EGD), COMBINED N/A 2019    Procedure: ESOPHAGOGASTRODUODENOSCOPY (EGD);  Surgeon: Artur Bruce MD;  Location: [facility]     HERNIORRHAPHY INGUINAL Right 3/8/2017    Procedure: HERNIORRHAPHY INGUINAL;  Surgeon: Kong Lassiter MD;  Location: [facility]     JOINT REPLACEMENT, HIP RT/LT Right 2012     MOHS MICROGRAPHIC PROCEDURE Left 2017    Left cheek     ZZC TOTAL KNEE ARTHROPLASTY Bilateral        Family History   Problem Relation Age of Onset     Stomach Cancer Father          at age 46       Social History     Socioeconomic History     Marital status: Single     Spouse name: Not on file     Number of children: Not on file     Years of education: Not on file     Highest education level: Not on file   Occupational History     Occupation:    Tobacco Use     Smoking status: Former     Types: Pipe     Quit date:      Years since quittin.8     Smokeless tobacco: Never   Vaping Use     Vaping Use: Never used   Substance and Sexual Activity     Alcohol use: Yes     Alcohol/week: 0.0 standard drinks of alcohol     Comment: occasional     Drug use: No     Sexual activity: Yes     Partners: Female   Other Topics Concern     Parent/sibling w/ CABG, MI or angioplasty before 65F 55M? Not Asked   Social History Narrative     Not on file     Social Determinants of Health     Financial Resource Strain: Not on file   Food Insecurity: Not on file   Transportation Needs: Not on file   Physical Activity: Not on file   Stress: Not on file   Social Connections: Not on file   Interpersonal Safety: Not on file   Housing Stability: Not on file       Current Outpatient Medications   Medication Sig Dispense Refill     acetaminophen (TYLENOL) 325 MG  tablet Take 325-650 mg by mouth every 4 hours as needed for mild pain or fever Reported on 3/6/2017 100 tablet      doxazosin (CARDURA) 1 MG tablet Take 1 tablet (1 mg) by mouth daily for 7 days, THEN 2 tablets (2 mg) daily for 7 days, THEN 3 tablets (3 mg) daily for 7 days, THEN 4 tablets (4 mg) daily for 30 days. 70 tablet 0     finasteride (PROSCAR) 5 MG tablet Take 1 tablet (5 mg) by mouth daily 90 tablet 3     loratadine (CLARITIN) 10 MG tablet Take 10 mg by mouth daily       Multiple Vitamins-Minerals (VISION-ANTONIETA PRESERVE OR) Take 1 tablet by mouth daily (Patient not taking: Reported on 5/26/2023)       tamsulosin (FLOMAX) 0.4 MG capsule Take 0.4 mg by mouth daily (Patient not taking: Reported on 5/26/2023)         Allergies   Allergen Reactions     Dust Mites      Hydrocodone-Acetaminophen Other (See Comments)     nightmares     No Clinical Screening - See Comments Other (See Comments)     Penicillins Unknown     As a child       Zithromax [Azithromycin Dihydrate]      diarrhea       REVIEW OF SYSTEMS:  CONSTITUTIONAL:  NEGATIVE for fever, chills, change in weight, not feeling tired  SKIN:  NEGATIVE for worrisome rashes, no skin lumps, no skin ulcers and no non-healing wounds  EYES:  NEGATIVE for vision changes or irritation.  ENT/MOUTH:  NEGATIVE.  No hearing loss, no hoarseness, no difficulty swallowing.  RESP:  NEGATIVE. No cough or shortness of breath.  CV:  NEGATIVE for chest pain, palpitations or peripheral edema  GI:  NEGATIVE for nausea, abdominal pain, heartburn, or change in bowel habits  :  Negative. No dysuria, no hematuria  MUSCULOSKELETAL:  See HPI above  NEURO:  NEGATIVE . No headaches, no dizziness,  no numbness  ENDOCRINE:  NEGATIVE for temperature intolerance, skin/hair changes  HEME/ALLERGY/IMMUNE:  NEGATIVE for bleeding problems  PSYCHIATRIC:  NEGATIVE. no anxiety, no depression.     Exam:  Vitals: BP (!) 176/93   Pulse 80   Resp 20   Ht 1.829 m (6')   Wt 127 kg (280 lb)   BMI  37.97 kg/m    BMI= Body mass index is 37.97 kg/m .  Constitutional:  healthy, alert and no distress  Neuro: Alert and Oriented x 3, no focal defects.  Psych: Affect normal   Respiratory: Breathing not labored.  Cardiovascular: normal peripheral pulses  Lymph: no adenopathy  Skin: No rashes,worrisome lesions or skin problems  His left knee shows range of motion from about 10 to 95 degrees.  He feels he is making progress with therapy with flexion.  He does have significant amount of crepitation in the knee with range of motion.  I can feel the 2 prominent screws just to medial and lateral side of the patella and anterior femoral flange.  There is significant crepitation over each of the screws.  He has good stability of MCL and LCL.  There is a small effusion.      Assessment:  1. Left total knee arthroplasty in good position from 2007.  2.  Left femoral periprosthetic fracture which appears to be healed well after placement of a retrograde nail in January 2023..  3.  There are definitely prominent screws at the distal femur just medial and just lateral to the anterior femoral flange.  There is considerable crepitation and pain over these.  4.  Decreased motion with both flexion contracture and lack of full flexion of the left knee.  He does not think he had this limitation prior to his fracture.    Plan: We discussed possible removal of the screws to assist with pain relief of the knee.  I feel the 2 oblique screws are the most problematic and the easiest to remove.  This could be done with local block directly over the screws with a MAC anesthesia.  To get out the 2 transverse screws would require either C arm or opening up the knee.  They appear flush, so I feel they are less important to remove.  We also discussed possibility of synovectomy to see if this would improve his motion.  It would also have the possibility of making it worse depending upon how he responded to that surgery.  After thorough discussion of  his options we have decided to remove just the 2 oblique screws and work on motion with therapy after this.  This will be done with MAC plus local anesthetic    Again, thank you for allowing me to participate in the care of your patient.        Sincerely,        Puma Clemente MD

## 2023-11-22 NOTE — TELEPHONE ENCOUNTER
Patient called to reschedule surgery- Moved 1/26 to 12/22  [facility] rescheduled case.

## 2023-11-27 ENCOUNTER — THERAPY VISIT (OUTPATIENT)
Dept: PHYSICAL THERAPY | Facility: CLINIC | Age: 73
End: 2023-11-27
Attending: ORTHOPAEDIC SURGERY
Payer: COMMERCIAL

## 2023-11-27 DIAGNOSIS — M97.12XS PERIPROSTHETIC FRACTURE AROUND INTERNAL PROSTHETIC LEFT KNEE JOINT, SEQUELA: Primary | ICD-10-CM

## 2023-11-27 DIAGNOSIS — Z96.652 HISTORY OF TOTAL KNEE ARTHROPLASTY, LEFT: ICD-10-CM

## 2023-11-27 PROCEDURE — 97112 NEUROMUSCULAR REEDUCATION: CPT | Mod: GP | Performed by: PHYSICAL THERAPIST

## 2023-11-27 PROCEDURE — 97110 THERAPEUTIC EXERCISES: CPT | Mod: GP | Performed by: PHYSICAL THERAPIST

## 2023-12-04 ENCOUNTER — OFFICE VISIT (OUTPATIENT)
Dept: FAMILY MEDICINE | Facility: CLINIC | Age: 73
End: 2023-12-04
Payer: COMMERCIAL

## 2023-12-04 ENCOUNTER — THERAPY VISIT (OUTPATIENT)
Dept: PHYSICAL THERAPY | Facility: CLINIC | Age: 73
End: 2023-12-04
Attending: ORTHOPAEDIC SURGERY
Payer: COMMERCIAL

## 2023-12-04 VITALS
WEIGHT: 277 LBS | DIASTOLIC BLOOD PRESSURE: 82 MMHG | SYSTOLIC BLOOD PRESSURE: 146 MMHG | HEIGHT: 72 IN | TEMPERATURE: 98.3 F | OXYGEN SATURATION: 97 % | HEART RATE: 75 BPM | RESPIRATION RATE: 12 BRPM | BODY MASS INDEX: 37.52 KG/M2

## 2023-12-04 DIAGNOSIS — Z96.653 HISTORY OF TOTAL KNEE ARTHROPLASTY, BILATERAL: ICD-10-CM

## 2023-12-04 DIAGNOSIS — Z01.818 PREOPERATIVE EXAMINATION: Primary | ICD-10-CM

## 2023-12-04 DIAGNOSIS — T84.84XA PAINFUL ORTHOPAEDIC HARDWARE (H): ICD-10-CM

## 2023-12-04 DIAGNOSIS — Z87.19 HISTORY OF GI BLEED: ICD-10-CM

## 2023-12-04 DIAGNOSIS — Z28.21 IMMUNIZATION DECLINED: ICD-10-CM

## 2023-12-04 DIAGNOSIS — Z96.652 HISTORY OF TOTAL KNEE ARTHROPLASTY, LEFT: ICD-10-CM

## 2023-12-04 DIAGNOSIS — M97.12XS PERIPROSTHETIC FRACTURE AROUND INTERNAL PROSTHETIC LEFT KNEE JOINT, SEQUELA: Primary | ICD-10-CM

## 2023-12-04 PROBLEM — I95.1 ORTHOSTATIC HYPOTENSION: Status: ACTIVE | Noted: 2023-01-06

## 2023-12-04 PROBLEM — M97.9XXA FRACTURE OF BONE ADJACENT TO PROSTHESIS: Status: ACTIVE | Noted: 2023-01-06

## 2023-12-04 LAB
HGB BLD-MCNC: 12.8 G/DL (ref 13.3–17.7)
POTASSIUM SERPL-SCNC: 4.4 MMOL/L (ref 3.4–5.3)

## 2023-12-04 PROCEDURE — 99214 OFFICE O/P EST MOD 30 MIN: CPT | Performed by: NURSE PRACTITIONER

## 2023-12-04 PROCEDURE — 36415 COLL VENOUS BLD VENIPUNCTURE: CPT | Performed by: NURSE PRACTITIONER

## 2023-12-04 PROCEDURE — 84132 ASSAY OF SERUM POTASSIUM: CPT | Performed by: NURSE PRACTITIONER

## 2023-12-04 PROCEDURE — 97110 THERAPEUTIC EXERCISES: CPT | Mod: GP | Performed by: PHYSICAL THERAPIST

## 2023-12-04 PROCEDURE — 85018 HEMOGLOBIN: CPT | Performed by: NURSE PRACTITIONER

## 2023-12-04 PROCEDURE — 97112 NEUROMUSCULAR REEDUCATION: CPT | Mod: GP | Performed by: PHYSICAL THERAPIST

## 2023-12-04 RX ORDER — RESPIRATORY SYNCYTIAL VIRUS VACCINE 120MCG/0.5
0.5 KIT INTRAMUSCULAR ONCE
Qty: 1 EACH | Refills: 0 | Status: CANCELLED | OUTPATIENT
Start: 2023-12-04 | End: 2023-12-04

## 2023-12-04 ASSESSMENT — PAIN SCALES - GENERAL: PAINLEVEL: NO PAIN (0)

## 2023-12-04 NOTE — PROGRESS NOTES
[address] [address]  Phone: [phone]  Fax: [phone]  Primary Provider: Adria Cowart  Pre-op Performing Provider: CARRIE DEVRIES      PREOPERATIVE EVALUATION:  Today's date: 12/4/2023    Jarrod is a 73 year old, presenting for the following:  Pre-Op Exam        Surgical Information:  Surgery/Procedure: REMOVAL, screws from left  KNEE   Surgery Location: [facility]  Surgeon: Puma Clemente MD   Surgery Date: 12/22/23  Time of Surgery: 7:30 AM   Where patient plans to recover: At home with family  Fax number for surgical facility: Note does not need to be faxed, will be available electronically in Epic.    Assessment & Plan     The proposed surgical procedure is considered INTERMEDIATE risk.    Preoperative examination  - Hemoglobin; Future  - Potassium; Future  - Hemoglobin  - Potassium    Painful orthopaedic hardware (H24)    History of GI bleed  Continues to avoid NSAID use. Hemoglobin today is pending.     History of total knee arthroplasty, bilateral    Immunization declined  Reviewed recommendations for Influenza, COVID, RSV and Varicella vaccination today, patient declined.        - No identified additional risk factors other than previously addressed    Antiplatelet or Anticoagulation Medication Instructions:   - Patient is on no antiplatelet or anticoagulation medications.    Additional Medication Instructions:  Patient is on no additional chronic medications    RECOMMENDATION:  APPROVAL GIVEN to proceed with proposed procedure, without further diagnostic evaluation.            Subjective       HPI related to upcoming procedure: Jarrod sustained a left leg fracture in January, 2023 requiring surgical fixation. Since then he has had continual pain in his left knee. He was found to have hardware moving within the knee and was recommended to have hardware removed.     Jarrod has a past  medical history of upper GI bleed several years ago, due to NSAID use. He has had a total bilateral knee replacement, his injury was a periprosthetic fracture just above the knee. He is taking Tylenol as needed for pain. He has a history of allergies, taking claritin as needed as well.         12/4/2023     9:14 AM   Preop Questions   1. Have you ever had a heart attack or stroke? No   2. Have you ever had surgery on your heart or blood vessels, such as a stent placement, a coronary artery bypass, or surgery on an artery in your head, neck, heart, or legs? No   3. Do you have chest pain with activity? No   4. Do you have a history of  heart failure? No   5. Do you currently have a cold, bronchitis or symptoms of other infection? No   6. Do you have a cough, shortness of breath, or wheezing? No   7. Do you or anyone in your family have previous history of blood clots? No   8. Do you or does anyone in your family have a serious bleeding problem such as prolonged bleeding following surgeries or cuts? No   9. Have you ever had problems with anemia or been told to take iron pills? YES - History of GI bleed, resolved   10. Have you had any abnormal blood loss such as black, tarry or bloody stools? YES - History of GI bleed    11. Have you ever had a blood transfusion? YES - History of GI bleed    11a. Have you ever had a transfusion reaction? No   12. Are you willing to have a blood transfusion if it is medically needed before, during, or after your surgery? Yes   13. Have you or any of your relatives ever had problems with anesthesia? No   14. Do you have sleep apnea, excessive snoring or daytime drowsiness? No   15. Do you have any artifical heart valves or other implanted medical devices like a pacemaker, defibrillator, or continuous glucose monitor? No   16. Do you have artificial joints? YES - Bilateral total knee replacements    17. Are you allergic to latex? No       Health Care Directive:  Patient does not have a  Health Care Directive or Living Will: Advance Directive received and scanned. Click on Code in the patient header to view.    Preoperative Review of :   reviewed - no record of controlled substances prescribed.          Review of Systems  Constitutional, neuro, ENT, endocrine, pulmonary, cardiac, gastrointestinal, genitourinary, musculoskeletal, integument and psychiatric systems are negative, except as otherwise noted.    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, sequela 10/16/2023     Priority: Medium    History of total knee arthroplasty, left 10/16/2023     Priority: Medium    Upper GI bleed 04/30/2019     Priority: Medium    Allergic rhinitis due to dust mite 01/09/2019     Priority: Medium    Sinus pressure 01/09/2019     Priority: Medium    Seasonal allergic rhinitis due to pollen 01/09/2019     Priority: Medium    Allergic rhinitis due to mold 01/09/2019     Priority: Medium    Penicillin allergy 01/09/2019     Priority: Medium    Advanced directives, counseling/discussion 12/09/2016     Priority: Medium     Info given      GI bleed 10/21/2016     Priority: Medium    Reducible right inguinal hernia 08/11/2016     Priority: Medium    CARDIOVASCULAR SCREENING; LDL GOAL LESS THAN 130 11/26/2012     Priority: Medium      Past Medical History:   Diagnosis Date    Arthritis     Duodenal ulcer     Gastric ulcer     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 01/02/2017    Left cheek     Past Surgical History:   Procedure Laterality Date    COLONOSCOPY  12/14/2012    Procedure: COLONOSCOPY;  Colonoscopy;  Surgeon: Sylvester Lucas MD;  Location:  GI    COLONOSCOPY N/A 10/26/2016    Procedure: COMBINED COLONOSCOPY, SINGLE OR MULTIPLE BIOPSY/POLYPECTOMY BY BIOPSY;  Surgeon: Trev Oquendo MD;  Location:  GI    ESOPHAGOSCOPY,  GASTROSCOPY, DUODENOSCOPY (EGD), COMBINED N/A 4/24/2017    Procedure: COMBINED ESOPHAGOSCOPY, GASTROSCOPY, DUODENOSCOPY (EGD), BIOPSY SINGLE OR MULTIPLE;  ESOPHAGOSCOPY, GASTROSCOPY, DUODENOSCOPY (EGD) with biopsies by forceps;  Surgeon: Puma Dominguez MD;  Location: [facility]    ESOPHAGOSCOPY, GASTROSCOPY, DUODENOSCOPY (EGD), COMBINED N/A 6/12/2017    Procedure: COMBINED ESOPHAGOSCOPY, GASTROSCOPY, DUODENOSCOPY (EGD);  ESOPHAGOSCOPY, GASTROSCOPY, DUODENOSCOPY (EGD);  Surgeon: Sylvester Lucas MD;  Location: [facility]    ESOPHAGOSCOPY, GASTROSCOPY, DUODENOSCOPY (EGD), COMBINED N/A 5/1/2019    Procedure: ESOPHAGOGASTRODUODENOSCOPY (EGD);  Surgeon: Artur Bruce MD;  Location: [facility]    HERNIORRHAPHY INGUINAL Right 3/8/2017    Procedure: HERNIORRHAPHY INGUINAL;  Surgeon: Kong Lassiter MD;  Location: [facility]    JOINT REPLACEMENT, HIP RT/LT Right 02/03/2012    MOHS MICROGRAPHIC PROCEDURE Left 01/02/2017    Left cheek    ZZC TOTAL KNEE ARTHROPLASTY Bilateral      Current Outpatient Medications   Medication Sig Dispense Refill    acetaminophen (TYLENOL) 325 MG tablet Take 325-650 mg by mouth every 4 hours as needed for mild pain or fever Reported on 3/6/2017 100 tablet     finasteride (PROSCAR) 5 MG tablet Take 1 tablet (5 mg) by mouth daily 90 tablet 3    loratadine (CLARITIN) 10 MG tablet Take 10 mg by mouth daily      doxazosin (CARDURA) 1 MG tablet Take 1 tablet (1 mg) by mouth daily for 7 days, THEN 2 tablets (2 mg) daily for 7 days, THEN 3 tablets (3 mg) daily for 7 days, THEN 4 tablets (4 mg) daily for 30 days. 70 tablet 0    Multiple Vitamins-Minerals (VISION-ANTONIETA PRESERVE OR) Take 1 tablet by mouth daily (Patient not taking: Reported on 5/26/2023)      tamsulosin (FLOMAX) 0.4 MG capsule Take 0.4 mg by mouth daily (Patient not taking: Reported on 5/26/2023)         Allergies   Allergen Reactions    Dust Mites     Hydrocodone-Acetaminophen Other (See Comments)     nightmares    No Clinical Screening - See Comments  Other (See Comments)    Penicillins Unknown     As a child      Zithromax [Azithromycin Dihydrate]      diarrhea        Social History     Tobacco Use    Smoking status: Former     Types: Pipe     Quit date:      Years since quittin.9    Smokeless tobacco: Never   Substance Use Topics    Alcohol use: Yes     Alcohol/week: 0.0 standard drinks of alcohol     Comment: occasional       History   Drug Use No         Objective     BP (!) 140/90 (BP Location: Left arm, Patient Position: Chair, Cuff Size: Adult Large)   Pulse 75   Temp 98.3  F (36.8  C) (Temporal)   Resp 12   Ht 1.829 m (6')   Wt 125.6 kg (277 lb)   SpO2 97%   BMI 37.57 kg/m      Physical Exam    GENERAL APPEARANCE: healthy, alert and no distress     EYES: EOMI,  PERRL     HENT: ear canals and TM's normal and nose and mouth without ulcers or lesions     NECK: no adenopathy, no asymmetry, masses, or scars and thyroid normal to palpation     RESP: lungs clear to auscultation - no rales, rhonchi or wheezes     CV: regular rates and rhythm, normal S1 S2, no S3 or S4 and no murmur, click or rub     ABDOMEN:  soft, nontender, no HSM or masses and bowel sounds normal     MS: extremities normal- no gross deformities noted, no evidence of inflammation in joints, FROM in all extremities.     SKIN: no suspicious lesions or rashes     NEURO: Normal strength and tone, sensory exam grossly normal, mentation intact and speech normal     PSYCH: mentation appears normal. and affect normal/bright     LYMPHATICS: No cervical adenopathy    Recent Labs   Lab Test 23  0835 23  1046   HGB 12.8* 11.1*    234    135*   POTASSIUM 4.4 3.9   CR 1.00 0.92        Diagnostics:  Labs pending at this time.  Results will be reviewed when available.   No EKG required, no history of coronary heart disease, significant arrhythmia, peripheral arterial disease or other structural heart disease.    Revised Cardiac Risk Index (RCRI):  The patient has the  following serious cardiovascular risks for perioperative complications:   - No serious cardiac risks = 0 points     RCRI Interpretation: 0 points: Class I (very low risk - 0.4% complication rate)         Signed Electronically by: Sabrina Bernal NP  Copy of this evaluation report is provided to requesting physician.

## 2023-12-04 NOTE — PATIENT INSTRUCTIONS
Instructed patient to cancel surgery and reschedule if develops high fever or is vomiting 1-3 days before surgery. Pre Operative History and Physical is good for 30 days.    Other Pre-Op Orders for when to stop eating the night before surgery, time of surgery, where & when to check in, parking, and any other specific pre-operative orders come from the surgeon's office. You should hear from them at least one day before surgery if not sooner for these specific instructions.    STOP any types of over the counter blood thinning medications (such as aspirin, Motrin/ibuprofen, Aleve/naproxen, vitamin E, or fish oil) 1 week prior to surgery. Tylenol (or acetaminophen) is okay to take for pain if needed    Recommend no alcohol consumption at least 48 hours prior to surgery

## 2023-12-21 ENCOUNTER — ANESTHESIA EVENT (OUTPATIENT)
Dept: SURGERY | Facility: CLINIC | Age: 73
End: 2023-12-21
Payer: COMMERCIAL

## 2023-12-21 ASSESSMENT — LIFESTYLE VARIABLES: TOBACCO_USE: 1

## 2023-12-22 ENCOUNTER — HOSPITAL ENCOUNTER (OUTPATIENT)
Facility: CLINIC | Age: 73
Discharge: HOME | End: 2023-12-22
Attending: ORTHOPAEDIC SURGERY | Admitting: ORTHOPAEDIC SURGERY
Payer: COMMERCIAL

## 2023-12-22 ENCOUNTER — ANESTHESIA (OUTPATIENT)
Dept: SURGERY | Facility: CLINIC | Age: 73
End: 2023-12-22
Payer: COMMERCIAL

## 2023-12-22 VITALS
TEMPERATURE: 98.4 F | RESPIRATION RATE: 16 BRPM | SYSTOLIC BLOOD PRESSURE: 106 MMHG | OXYGEN SATURATION: 95 % | HEART RATE: 50 BPM | DIASTOLIC BLOOD PRESSURE: 58 MMHG

## 2023-12-22 DIAGNOSIS — T84.84XA PAINFUL ORTHOPAEDIC HARDWARE (H): Primary | ICD-10-CM

## 2023-12-22 PROCEDURE — 360N000077 HC SURGERY LEVEL 4, PER MIN: Performed by: ORTHOPAEDIC SURGERY

## 2023-12-22 PROCEDURE — 999N000141 HC STATISTIC PRE-PROCEDURE NURSING ASSESSMENT: Performed by: ORTHOPAEDIC SURGERY

## 2023-12-22 PROCEDURE — 20680 REMOVAL OF IMPLANT DEEP: CPT | Mod: LT | Performed by: ORTHOPAEDIC SURGERY

## 2023-12-22 PROCEDURE — 710N000012 HC RECOVERY PHASE 2, PER MINUTE: Performed by: ORTHOPAEDIC SURGERY

## 2023-12-22 PROCEDURE — 250N000009 HC RX 250: Performed by: ORTHOPAEDIC SURGERY

## 2023-12-22 PROCEDURE — 272N000001 HC OR GENERAL SUPPLY STERILE: Performed by: ORTHOPAEDIC SURGERY

## 2023-12-22 PROCEDURE — 258N000003 HC RX IP 258 OP 636: Performed by: NURSE ANESTHETIST, CERTIFIED REGISTERED

## 2023-12-22 PROCEDURE — 250N000011 HC RX IP 250 OP 636: Performed by: NURSE ANESTHETIST, CERTIFIED REGISTERED

## 2023-12-22 PROCEDURE — 250N000009 HC RX 250: Performed by: NURSE ANESTHETIST, CERTIFIED REGISTERED

## 2023-12-22 PROCEDURE — 370N000017 HC ANESTHESIA TECHNICAL FEE, PER MIN: Performed by: ORTHOPAEDIC SURGERY

## 2023-12-22 RX ORDER — FENTANYL CITRATE 50 UG/ML
INJECTION, SOLUTION INTRAMUSCULAR; INTRAVENOUS PRN
Status: DISCONTINUED | OUTPATIENT
Start: 2023-12-22 | End: 2023-12-22

## 2023-12-22 RX ORDER — SODIUM CHLORIDE, SODIUM LACTATE, POTASSIUM CHLORIDE, CALCIUM CHLORIDE 600; 310; 30; 20 MG/100ML; MG/100ML; MG/100ML; MG/100ML
INJECTION, SOLUTION INTRAVENOUS CONTINUOUS
Status: DISCONTINUED | OUTPATIENT
Start: 2023-12-22 | End: 2023-12-22 | Stop reason: HOSPADM

## 2023-12-22 RX ORDER — ONDANSETRON 2 MG/ML
INJECTION INTRAMUSCULAR; INTRAVENOUS PRN
Status: DISCONTINUED | OUTPATIENT
Start: 2023-12-22 | End: 2023-12-22

## 2023-12-22 RX ORDER — FENTANYL CITRATE 50 UG/ML
50 INJECTION, SOLUTION INTRAMUSCULAR; INTRAVENOUS
Status: DISCONTINUED | OUTPATIENT
Start: 2023-12-22 | End: 2023-12-22 | Stop reason: HOSPADM

## 2023-12-22 RX ORDER — TRAMADOL HYDROCHLORIDE 50 MG/1
50 TABLET ORAL EVERY 6 HOURS PRN
Qty: 10 TABLET | Refills: 0 | Status: SHIPPED | OUTPATIENT
Start: 2023-12-22 | End: 2023-12-25

## 2023-12-22 RX ORDER — PROPOFOL 10 MG/ML
INJECTION, EMULSION INTRAVENOUS CONTINUOUS PRN
Status: DISCONTINUED | OUTPATIENT
Start: 2023-12-22 | End: 2023-12-22

## 2023-12-22 RX ORDER — ONDANSETRON 2 MG/ML
4 INJECTION INTRAMUSCULAR; INTRAVENOUS EVERY 30 MIN PRN
Status: DISCONTINUED | OUTPATIENT
Start: 2023-12-22 | End: 2023-12-22 | Stop reason: HOSPADM

## 2023-12-22 RX ORDER — SODIUM CHLORIDE, SODIUM LACTATE, POTASSIUM CHLORIDE, CALCIUM CHLORIDE 600; 310; 30; 20 MG/100ML; MG/100ML; MG/100ML; MG/100ML
INJECTION, SOLUTION INTRAVENOUS CONTINUOUS PRN
Status: DISCONTINUED | OUTPATIENT
Start: 2023-12-22 | End: 2023-12-22

## 2023-12-22 RX ORDER — PROPOFOL 10 MG/ML
INJECTION, EMULSION INTRAVENOUS PRN
Status: DISCONTINUED | OUTPATIENT
Start: 2023-12-22 | End: 2023-12-22

## 2023-12-22 RX ORDER — LIDOCAINE 40 MG/G
CREAM TOPICAL
Status: DISCONTINUED | OUTPATIENT
Start: 2023-12-22 | End: 2023-12-22 | Stop reason: HOSPADM

## 2023-12-22 RX ORDER — LIDOCAINE HYDROCHLORIDE 20 MG/ML
INJECTION, SOLUTION INFILTRATION; PERINEURAL PRN
Status: DISCONTINUED | OUTPATIENT
Start: 2023-12-22 | End: 2023-12-22

## 2023-12-22 RX ORDER — ONDANSETRON 4 MG/1
4 TABLET, ORALLY DISINTEGRATING ORAL EVERY 30 MIN PRN
Status: DISCONTINUED | OUTPATIENT
Start: 2023-12-22 | End: 2023-12-22 | Stop reason: HOSPADM

## 2023-12-22 RX ORDER — BUPIVACAINE HYDROCHLORIDE AND EPINEPHRINE 5; 5 MG/ML; UG/ML
1 INJECTION, SOLUTION PERINEURAL ONCE
Status: COMPLETED | OUTPATIENT
Start: 2023-12-22 | End: 2023-12-22

## 2023-12-22 RX ADMIN — SODIUM CHLORIDE, POTASSIUM CHLORIDE, SODIUM LACTATE AND CALCIUM CHLORIDE: 600; 310; 30; 20 INJECTION, SOLUTION INTRAVENOUS at 09:08

## 2023-12-22 RX ADMIN — LIDOCAINE HYDROCHLORIDE 50 MG: 20 INJECTION, SOLUTION INFILTRATION; PERINEURAL at 09:45

## 2023-12-22 RX ADMIN — PROPOFOL 30 MG: 10 INJECTION, EMULSION INTRAVENOUS at 09:47

## 2023-12-22 RX ADMIN — SODIUM CHLORIDE, POTASSIUM CHLORIDE, SODIUM LACTATE AND CALCIUM CHLORIDE: 600; 310; 30; 20 INJECTION, SOLUTION INTRAVENOUS at 09:45

## 2023-12-22 RX ADMIN — FENTANYL CITRATE 50 MCG: 50 INJECTION INTRAMUSCULAR; INTRAVENOUS at 09:43

## 2023-12-22 RX ADMIN — PROPOFOL 150 MCG/KG/MIN: 10 INJECTION, EMULSION INTRAVENOUS at 09:50

## 2023-12-22 RX ADMIN — ONDANSETRON 4 MG: 2 INJECTION INTRAMUSCULAR; INTRAVENOUS at 09:54

## 2023-12-22 RX ADMIN — FENTANYL CITRATE 50 MCG: 50 INJECTION INTRAMUSCULAR; INTRAVENOUS at 09:45

## 2023-12-22 ASSESSMENT — ACTIVITIES OF DAILY LIVING (ADL)
ADLS_ACUITY_SCORE: 35
ADLS_ACUITY_SCORE: 35

## 2023-12-22 NOTE — ANESTHESIA PREPROCEDURE EVALUATION
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    Left cheek      Past Surgical History:   Procedure Laterality Date     COLONOSCOPY  2012    Procedure: COLONOSCOPY;  Colonoscopy;  Surgeon: Sylvester Lucas MD;  Location: [facility]     COLONOSCOPY N/A 10/26/2016    Procedure: COMBINED COLONOSCOPY, SINGLE OR MULTIPLE BIOPSY/POLYPECTOMY BY BIOPSY;  Surgeon: Trev Oquendo MD;  Location:  GI     ESOPHAGOSCOPY, GASTROSCOPY, DUODENOSCOPY (EGD), COMBINED N/A 2017    Procedure: COMBINED ESOPHAGOSCOPY, GASTROSCOPY, DUODENOSCOPY (EGD), BIOPSY SINGLE OR MULTIPLE;  ESOPHAGOSCOPY, GASTROSCOPY, DUODENOSCOPY (EGD) with biopsies by forceps;  Surgeon: Puma Dominguez MD;  Location:  GI     ESOPHAGOSCOPY, GASTROSCOPY, DUODENOSCOPY (EGD), COMBINED N/A 2017    Procedure: COMBINED ESOPHAGOSCOPY, GASTROSCOPY, DUODENOSCOPY (EGD);  ESOPHAGOSCOPY, GASTROSCOPY, DUODENOSCOPY (EGD);  Surgeon: Sylvester Lucas MD;  Location:  GI     ESOPHAGOSCOPY, GASTROSCOPY, DUODENOSCOPY (EGD), COMBINED N/A 2019    Procedure: ESOPHAGOGASTRODUODENOSCOPY (EGD);  Surgeon: Artur Bruce MD;  Location:  GI     HERNIORRHAPHY INGUINAL Right 3/8/2017    Procedure: HERNIORRHAPHY INGUINAL;  Surgeon: Kong Lassiter MD;  Location: [facility]     JOINT REPLACEMENT, HIP RT/LT Right 2012     MOHS MICROGRAPHIC PROCEDURE Left 2017    Left cheek     ZZC TOTAL KNEE ARTHROPLASTY Bilateral       Allergies   Allergen Reactions     Dust Mites      Hydrocodone-Acetaminophen Other (See Comments)     nightmares     No Clinical Screening - See Comments Other (See Comments)     Penicillins Unknown     As a child       Zithromax [Azithromycin  Dihydrate]      diarrhea      Social History     Tobacco Use     Smoking status: Former     Types: Pipe     Quit date:      Years since quittin.9     Smokeless tobacco: Never   Substance Use Topics     Alcohol use: Yes     Alcohol/week: 0.0 standard drinks of alcohol     Comment: occasional      Wt Readings from Last 1 Encounters:   23 125.6 kg (277 lb)        Anesthesia Evaluation   Pt has had prior anesthetic. Type: General and MAC.        ROS/MED HX  ENT/Pulmonary: Comment: H/O mediastinal mass     (+)           allergic rhinitis,     tobacco use, Current use,                       Neurologic:  - neg neurologic ROS     Cardiovascular: Comment: H/O orthostatic hypotension     (+)  - -   -  - -                                 Previous cardiac testing   Echo: Date: Results:    Stress Test:  Date: Results:    ECG Reviewed:  Date: 2019 Results:  SR with 1st degree AV block and RBBB  Cath:  Date:  Results:  Normal findings     METS/Exercise Tolerance:     Hematologic:  - neg hematologic  ROS     Musculoskeletal: Comment: Painful retained hardware  (+)  arthritis,             GI/Hepatic: Comment: H/O duodenal ulcer  H/O GI bleed      Renal/Genitourinary:  - neg Renal ROS     Endo:     (+)               Obesity,       Psychiatric/Substance Use:  - neg psychiatric ROS     Infectious Disease:  - neg infectious disease ROS     Malignancy:   (+) Malignancy, History of Skin.Skin CA Remission status post Surgery.      Other:            Physical Exam    Airway  airway exam normal      Mallampati: II   TM distance: > 3 FB   Neck ROM: full   Mouth opening: > 3 cm    Respiratory Devices and Support         Dental  no notable dental history         Cardiovascular   cardiovascular exam normal       Rhythm and rate: regular and normal     Pulmonary   pulmonary exam normal        breath sounds clear to auscultation         OUTSIDE LABS:  CBC:   Lab Results   Component Value Date    WBC 4.1 2023    WBC 3.8  "(L) 02/03/2023    HGB 12.8 (L) 12/04/2023    HGB 12.8 (L) 05/26/2023    HCT 38.1 (L) 05/26/2023    HCT 33.8 (L) 02/03/2023     05/26/2023     02/03/2023     BMP:   Lab Results   Component Value Date     05/26/2023     (L) 02/03/2023    POTASSIUM 4.4 12/04/2023    POTASSIUM 4.4 05/26/2023    CHLORIDE 105 05/26/2023    CHLORIDE 102 02/03/2023    CO2 23 05/26/2023    CO2 22 02/03/2023    BUN 25.8 (H) 05/26/2023    BUN 22.0 02/03/2023    CR 1.00 05/26/2023    CR 0.92 02/03/2023     (H) 05/26/2023     (H) 02/03/2023     COAGS:   Lab Results   Component Value Date    PTT 26 04/30/2019    INR 1.06 04/30/2019     POC: No results found for: \"BGM\", \"HCG\", \"HCGS\"  HEPATIC:   Lab Results   Component Value Date    ALBUMIN 3.7 02/05/2021    PROTTOTAL 7.5 02/05/2021    ALT 25 02/05/2021    AST 18 02/05/2021    ALKPHOS 93 02/05/2021    BILITOTAL 0.9 02/05/2021     OTHER:   Lab Results   Component Value Date    PH 6.0 09/06/2002    DYLAN 9.3 05/26/2023    LIPASE 134 10/12/2015    TSH 1.49 10/12/2015       Anesthesia Plan    ASA Status:  3    NPO Status:  NPO Appropriate    Anesthesia Type: MAC.     - Reason for MAC: straight local not clinically adequate   Induction: Intravenous, Propofol.   Maintenance: TIVA.        Consents    Anesthesia Plan(s) and associated risks, benefits, and realistic alternatives discussed. Questions answered and patient/representative(s) expressed understanding.     - Discussed:     - Discussed with:  Patient      - Extended Intubation/Ventilatory Support Discussed: No.      - Patient is DNR/DNI Status: No     Use of blood products discussed: No .     Postoperative Care    Pain management: Oral pain medications, IV analgesics.   PONV prophylaxis: Ondansetron (or other 5HT-3), Background Propofol Infusion     Comments:    Other Comments: The risks and benefits of anesthesia, and the alternatives where applicable, have been discussed with the patient, and they wish to " proceed.              JO Luna CRNA    I have reviewed the pertinent notes and labs in the chart from the past 30 days and (re)examined the patient.  Any updates or changes from those notes are reflected in this note.              # Obesity: Estimated body mass index is 37.57 kg/m  as calculated from the following:    Height as of 12/4/23: 1.829 m (6').    Weight as of 12/4/23: 125.6 kg (277 lb).

## 2023-12-22 NOTE — ANESTHESIA CARE TRANSFER NOTE
Patient: Jarrod Lewis    Procedure: Procedure(s):  REMOVAL, screws from left  KNEE       Diagnosis: Periprosthetic fracture around internal prosthetic left knee joint, sequela [M97.12XS]  Painful orthopaedic hardware (H24) [T84.84XA]  Diagnosis Additional Information: No value filed.    Anesthesia Type:   MAC     Note:    Oropharynx: oropharynx clear of all foreign objects and spontaneously breathing  Level of Consciousness: drowsy  Oxygen Supplementation: face mask    Independent Airway: airway patency satisfactory and stable  Dentition: dentition unchanged  Vital Signs Stable: post-procedure vital signs reviewed and stable  Report to RN Given: handoff report given  Patient transferred to: PACU    Handoff Report: Identifed the Patient, Identified the Reponsible Provider, Reviewed the pertinent medical history, Discussed the surgical course, Reviewed Intra-OP anesthesia mangement and issues during anesthesia, Set expectations for post-procedure period and Allowed opportunity for questions and acknowledgement of understanding  Vitals:  Vitals Value Taken Time   BP     Temp     Pulse     Resp     SpO2         Electronically Signed By: JO Luna CRNA  December 22, 2023  10:43 AM

## 2023-12-22 NOTE — ANESTHESIA POSTPROCEDURE EVALUATION
Patient: Jarrod Lewis    Procedure: Procedure(s):  REMOVAL, screws from left  KNEE       Anesthesia Type:  MAC    Note:  Disposition: Outpatient   Postop Pain Control: Uneventful            Sign Out: Well controlled pain   PONV: No   Neuro/Psych: Uneventful            Sign Out: Acceptable/Baseline neuro status   Airway/Respiratory: Uneventful            Sign Out: Acceptable/Baseline resp. status   CV/Hemodynamics: Uneventful            Sign Out: Acceptable CV status   Other NRE: NONE   DID A NON-ROUTINE EVENT OCCUR? No    Event details/Postop Comments:  Pt was happy with anesthesia care.  No complications.  I will follow up with the pt if needed.       Last vitals:  Vitals Value Taken Time   /65 12/22/23 1115   Temp     Pulse 53 12/22/23 1115   Resp 16 12/22/23 1100   SpO2 95 % 12/22/23 1118   Vitals shown include unfiled device data.    Electronically Signed By: JO Luna CRNA  December 22, 2023  11:30 AM

## 2023-12-22 NOTE — BRIEF OP NOTE
[facility]    Brief Operative Note    Pre-operative diagnosis: Periprosthetic fracture around internal prosthetic left knee joint, sequela [M97.12XS]  Painful orthopaedic hardware (H24) [T84.84XA]  Post-operative diagnosis Same as pre-operative diagnosis    Procedure: REMOVAL, screws from left  KNEE, Left - Knee    Surgeon: Surgeon(s) and Role:     * Puma Clemente MD - Primary     * Uziel Merritt PA-C - Fellow - Assisting  Anesthesia: MAC with Local   Estimated Blood Loss: 5 mL from 12/22/2023  9:45 AM to 12/22/2023 10:40 AM      Drains: None  Specimens: * No specimens in log *  Findings:   Prominent screws x 2 distal femur .  Complications: None.  Implants: * No implants in log *

## 2023-12-28 ENCOUNTER — E-VISIT (OUTPATIENT)
Dept: INTERNAL MEDICINE | Facility: CLINIC | Age: 73
End: 2023-12-28
Payer: COMMERCIAL

## 2023-12-28 ENCOUNTER — THERAPY VISIT (OUTPATIENT)
Dept: PHYSICAL THERAPY | Facility: CLINIC | Age: 73
End: 2023-12-28
Attending: ORTHOPAEDIC SURGERY
Payer: COMMERCIAL

## 2023-12-28 DIAGNOSIS — Z96.652 HISTORY OF TOTAL KNEE ARTHROPLASTY, LEFT: ICD-10-CM

## 2023-12-28 DIAGNOSIS — M97.9XXA: Primary | ICD-10-CM

## 2023-12-28 DIAGNOSIS — J01.00 ACUTE NON-RECURRENT MAXILLARY SINUSITIS: Primary | ICD-10-CM

## 2023-12-28 PROCEDURE — 97140 MANUAL THERAPY 1/> REGIONS: CPT | Mod: GP | Performed by: PHYSICAL THERAPIST

## 2023-12-28 PROCEDURE — 97110 THERAPEUTIC EXERCISES: CPT | Mod: GP | Performed by: PHYSICAL THERAPIST

## 2023-12-28 PROCEDURE — 99421 OL DIG E/M SVC 5-10 MIN: CPT | Performed by: INTERNAL MEDICINE

## 2023-12-28 RX ORDER — DOXYCYCLINE 100 MG/1
100 CAPSULE ORAL 2 TIMES DAILY
Qty: 14 CAPSULE | Refills: 0 | Status: SHIPPED | OUTPATIENT
Start: 2023-12-28 | End: 2024-01-16

## 2023-12-28 ASSESSMENT — ACTIVITIES OF DAILY LIVING (ADL)
KNEE_ACTIVITY_OF_DAILY_LIVING_SUM: 53
KNEEL ON THE FRONT OF YOUR KNEE: ACTIVITY IS FAIRLY DIFFICULT
GO DOWN STAIRS: ACTIVITY IS SOMEWHAT DIFFICULT
KNEE_ACTIVITY_OF_DAILY_LIVING_SCORE: 75.71
WALK: ACTIVITY IS SOMEWHAT DIFFICULT
STIFFNESS: I DO NOT HAVE THE SYMPTOM
LIMPING: THE SYMPTOM AFFECTS MY ACTIVITY SLIGHTLY
GO UP STAIRS: ACTIVITY IS SOMEWHAT DIFFICULT
RISE FROM A CHAIR: ACTIVITY IS NOT DIFFICULT
STAND: ACTIVITY IS MINIMALLY DIFFICULT
SQUAT: I AM UNABLE TO DO THE ACTIVITY
SIT WITH YOUR KNEE BENT: ACTIVITY IS NOT DIFFICULT
SWELLING: I DO NOT HAVE THE SYMPTOM
GIVING WAY, BUCKLING OR SHIFTING OF KNEE: I DO NOT HAVE THE SYMPTOM
HOW_WOULD_YOU_RATE_THE_OVERALL_FUNCTION_OF_YOUR_KNEE_DURING_YOUR_USUAL_DAILY_ACTIVITIES?: ABNORMAL
HOW_WOULD_YOU_RATE_THE_CURRENT_FUNCTION_OF_YOUR_KNEE_DURING_YOUR_USUAL_DAILY_ACTIVITIES_ON_A_SCALE_FROM_0_TO_100_WITH_100_BEING_YOUR_LEVEL_OF_KNEE_FUNCTION_PRIOR_TO_YOUR_INJURY_AND_0_BEING_THE_INABILITY_TO_PERFORM_ANY_OF_YOUR_USUAL_DAILY_ACTIVITIES?: 80
RAW_SCORE: 53
AS_A_RESULT_OF_YOUR_KNEE_INJURY,_HOW_WOULD_YOU_RATE_YOUR_CURRENT_LEVEL_OF_DAILY_ACTIVITY?: ABNORMAL
PAIN: I DO NOT HAVE THE SYMPTOM
WEAKNESS: I DO NOT HAVE THE SYMPTOM

## 2023-12-28 NOTE — PROGRESS NOTES
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.12XS); History of total knee arthroplasty, left (Z96.652)   PT Tx Diagnosis Decreased ROM, weakness, poor stability   Quick Adds Certification   Progress Note/Certification   Start of Care Date 10/16/23   Onset of illness/injury or Date of Surgery 01/04/23   Therapy Frequency 1x/week   Predicted Duration 8 weeks   Certification date from 12/12/23   Certification date to 02/10/24   Progress Note Due Date 02/10/24   Progress Note Completed Date 12/28/23   GOALS   PT Goals 2;3;4   PT Goal 1   Goal Identifier #1   Goal Description Pt will demonstrate AROM of the L knee 0-115 degs in order to progress to functional activities.   Rationale to maximize safety and independence with performance of ADLs and functional tasks;to maximize safety and independence within the home;to maximize safety and independence within the community   Goal Progress L knee: 2 degs to 108 degs.   Target Date 02/10/24   PT Goal 2   Goal Identifier #2   Goal Description Pt will demonstrate 4+/5 of L quad MMT testing in order to demonstrate ability to complete more functional activities like stairs.   Rationale to maximize safety and independence with performance of ADLs and functional tasks   Goal Progress 4/5 L quad strength   Target Date 02/10/24   PT Goal 3   Goal Identifier #3   Goal Description Pt will demonstrate ability to negotiate stairs with reciprocal pattern and no pain in the L knee.   Rationale to maximize safety and independence within the home;to maximize safety and independence within the community   Goal Progress No pain with the stairs but feels more weakness.   Target Date 02/10/24   PT Goal 4   Goal Identifier #4   Goal Description Pt will demonstrate ability to walk without pain  in the L knee and no assistive device on even and uneven surfaces.   Rationale to maximize safety and independence within the community   Goal Progress No pain with walking.   Target Date 02/10/24   Subjective Report   Subjective Report Pt states 12/22 had hardware removed from knee.  Instant relief in the knee for pain.  States that he doesn't feel anything moving in the knee.  Feeling stronger, able to reciprocal on the stairs.  Walks better and doesn't have the pain with walking.   Objective Measures   Objective Measures Objective Measure 1;Objective Measure 2;Objective Measure 3   Objective Measure 1   Objective Measure Knee Outcome Survey   Details 75.71%   Objective Measure 2   Objective Measure ROM   Details L knee: 2 degs to 108 degs.   Objective Measure 3   Objective Measure Strength   Details Improved quad strength.   Therapeutic Procedure/Exercise   Therapeutic Procedures: strength, endurance, ROM, flexibillity minutes (67474) 15   Ther Proc 1 - Details Nustep seat at level 11 and resistance level 10, at minute 5 moved seat up to level 9 x 12 mins.  Heel slides x 5.  Prone extension hangs x 2 mins.   Skilled Intervention Exercises for strength and ROM.   Patient Response/Progress Improved ROM.   Manual Therapy   Manual Therapy: Mobilization, MFR, MLD, friction massage minutes (56364) 25   Manual Therapy 1 - Details Edema massage and MFR to the knee around incisions.   Skilled Intervention Manual techniques for tissue  mobility   Patient Response/Progress Improved   Education   Learner/Method Patient;Listening;Reading;Demonstration;Pictures/Video;No Barriers to Learning   Education Comments HEP via PTRx   Plan   Home program Standing against wall hip abd.  Prone knee flexion and extension stretching today. Tandem balance and SAQ.  Faizan and seated HS stretching, bridges with toes up, clamshells with only heels together added green band   Plan for next session Progress strength and ROM as able.   Total  Session Time   Timed Code Treatment Minutes 40   Total Treatment Time (sum of timed and untimed services) 40         [facility]                                                                                   OUTPATIENT PHYSICAL THERAPY    PLAN OF TREATMENT FOR OUTPATIENT REHABILITATION   Patient's Last Name, First Name, Jarrod Esquivel YOB: 1950   Provider's Name   [facility]   Medical Record No.  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              Referring Provider:  Tawanda Akhtar    Initial Assessment  See Epic Evaluation- Start of Care Date: 10/16/23    PLAN  Continue therapy per current plan of care.    Beginning/End Dates of Progress Note Reporting Period:  10/16/2023 to 12/28/2023    Referring Provider:  Tawanda Akhtar

## 2023-12-28 NOTE — PATIENT INSTRUCTIONS
Thank you for choosing us for your care. I have placed an order for a prescription so that you can start treatment. View your full visit summary for details by clicking on the link below. Your pharmacist will able to address any questions you may have about the medication.     If you're not feeling better within 5-7 days, please schedule an appointment.  You can schedule an appointment right here in [facility], or call [phone]  If the visit is for the same symptoms as your eVisit, we'll refund the cost of your eVisit if seen within seven days.

## 2024-01-04 ENCOUNTER — OFFICE VISIT (OUTPATIENT)
Dept: ORTHOPEDICS | Facility: CLINIC | Age: 74
End: 2024-01-04
Payer: COMMERCIAL

## 2024-01-04 ENCOUNTER — THERAPY VISIT (OUTPATIENT)
Dept: PHYSICAL THERAPY | Facility: CLINIC | Age: 74
End: 2024-01-04
Attending: ORTHOPAEDIC SURGERY
Payer: COMMERCIAL

## 2024-01-04 VITALS
SYSTOLIC BLOOD PRESSURE: 138 MMHG | BODY MASS INDEX: 36.84 KG/M2 | WEIGHT: 272 LBS | DIASTOLIC BLOOD PRESSURE: 73 MMHG | HEIGHT: 72 IN | HEART RATE: 88 BPM | RESPIRATION RATE: 18 BRPM

## 2024-01-04 DIAGNOSIS — T84.84XA PAINFUL ORTHOPAEDIC HARDWARE (H): Primary | ICD-10-CM

## 2024-01-04 DIAGNOSIS — Z96.652 HISTORY OF TOTAL KNEE ARTHROPLASTY, LEFT: ICD-10-CM

## 2024-01-04 DIAGNOSIS — M97.9XXA: Primary | ICD-10-CM

## 2024-01-04 PROCEDURE — 99024 POSTOP FOLLOW-UP VISIT: CPT | Performed by: ORTHOPAEDIC SURGERY

## 2024-01-04 PROCEDURE — 97110 THERAPEUTIC EXERCISES: CPT | Mod: GP | Performed by: PHYSICAL THERAPIST

## 2024-01-04 PROCEDURE — 97140 MANUAL THERAPY 1/> REGIONS: CPT | Mod: GP | Performed by: PHYSICAL THERAPIST

## 2024-01-04 NOTE — LETTER
1/4/2024         RE: Jarrod Lewis  [address]  [facility] [address]        Dear Colleague,    Thank you for referring your patient, Jarrod Lewis, to the [facility]. Please see a copy of my visit note below.    Follow-up screw removal x 2 from the left knee femoral karely on 12/22/2023.  Removal of the oblique screws has led to significant improvement of his pain.  He still does have crepitation with range of motion, but is happy with his progress.  He has significantly improved his motion to 2 to 110 degrees postop.  Preop he was 10 to 95 degrees.  Wounds are healing well.    Plan: We will have him continue range of motion.  Resume activity as tolerated.  Return to clinic as needed.    Again, thank you for allowing me to participate in the care of your patient.        Sincerely,        Puma Clemente MD

## 2024-01-04 NOTE — PROGRESS NOTES
Follow-up screw removal x 2 from the left knee femoral karely on 12/22/2023.  Removal of the oblique screws has led to significant improvement of his pain.  He still does have crepitation with range of motion, but is happy with his progress.  He has significantly improved his motion to 2 to 110 degrees postop.  Preop he was 10 to 95 degrees.  Wounds are healing well.    Plan: We will have him continue range of motion.  Resume activity as tolerated.  Return to clinic as needed.

## 2024-01-11 ENCOUNTER — THERAPY VISIT (OUTPATIENT)
Dept: PHYSICAL THERAPY | Facility: CLINIC | Age: 74
End: 2024-01-11
Attending: ORTHOPAEDIC SURGERY
Payer: COMMERCIAL

## 2024-01-11 DIAGNOSIS — M97.9XXD: Primary | ICD-10-CM

## 2024-01-11 DIAGNOSIS — Z96.652 HISTORY OF TOTAL KNEE ARTHROPLASTY, LEFT: ICD-10-CM

## 2024-01-11 PROCEDURE — 97140 MANUAL THERAPY 1/> REGIONS: CPT | Mod: GP | Performed by: PHYSICAL THERAPIST

## 2024-01-11 PROCEDURE — 97110 THERAPEUTIC EXERCISES: CPT | Mod: GP | Performed by: PHYSICAL THERAPIST

## 2024-01-16 ENCOUNTER — OFFICE VISIT (OUTPATIENT)
Dept: INTERNAL MEDICINE | Facility: CLINIC | Age: 74
End: 2024-01-16
Payer: COMMERCIAL

## 2024-01-16 VITALS
TEMPERATURE: 98.3 F | BODY MASS INDEX: 36.84 KG/M2 | HEART RATE: 63 BPM | DIASTOLIC BLOOD PRESSURE: 80 MMHG | HEIGHT: 72 IN | WEIGHT: 272 LBS | SYSTOLIC BLOOD PRESSURE: 138 MMHG | OXYGEN SATURATION: 96 % | RESPIRATION RATE: 14 BRPM

## 2024-01-16 DIAGNOSIS — R59.0 HILAR ADENOPATHY: ICD-10-CM

## 2024-01-16 DIAGNOSIS — J30.2 SEASONAL ALLERGIC RHINITIS, UNSPECIFIED TRIGGER: Primary | ICD-10-CM

## 2024-01-16 DIAGNOSIS — N40.1 BENIGN PROSTATIC HYPERPLASIA WITH NOCTURIA: ICD-10-CM

## 2024-01-16 DIAGNOSIS — R35.1 BENIGN PROSTATIC HYPERPLASIA WITH NOCTURIA: ICD-10-CM

## 2024-01-16 PROCEDURE — 99214 OFFICE O/P EST MOD 30 MIN: CPT | Performed by: INTERNAL MEDICINE

## 2024-01-16 RX ORDER — RESPIRATORY SYNCYTIAL VIRUS VACCINE 120MCG/0.5
0.5 KIT INTRAMUSCULAR ONCE
Qty: 1 EACH | Refills: 0 | Status: CANCELLED | OUTPATIENT
Start: 2024-01-16 | End: 2024-01-16

## 2024-01-16 RX ORDER — FLUTICASONE PROPIONATE 50 MCG
1 SPRAY, SUSPENSION (ML) NASAL DAILY
Qty: 16 G | Refills: 3 | Status: SHIPPED | OUTPATIENT
Start: 2024-01-16

## 2024-01-16 RX ORDER — FINASTERIDE 5 MG/1
5 TABLET, FILM COATED ORAL DAILY
Qty: 30 TABLET | Refills: 0 | Status: SHIPPED | OUTPATIENT
Start: 2024-01-16

## 2024-01-16 RX ORDER — TAMSULOSIN HYDROCHLORIDE 0.4 MG/1
0.4 CAPSULE ORAL DAILY
Qty: 30 CAPSULE | Refills: 0 | Status: SHIPPED | OUTPATIENT
Start: 2024-01-16 | End: 2024-02-13

## 2024-01-16 ASSESSMENT — PAIN SCALES - GENERAL: PAINLEVEL: NO PAIN (0)

## 2024-01-16 NOTE — PROGRESS NOTES
Upon reviewing the patient's chart, it is noted that he is due for a surveillance CAT scan of his lungs for follow-up of his hilar adenopathy.  Please contact the patient notify him of this and help him schedule the CT.      Thank you.      Supa

## 2024-01-16 NOTE — PROGRESS NOTES
"Charu Melendez is a 73 year old, presenting for the following health issues:  RECHECK (sinuses)        1/16/2024     9:50 AM   Additional Questions   Roomed by Macario Elliott CMA       History of Present Illness       Headaches:   Since the patient's last clinic visit, headaches are: no change  The patient is getting headaches:  Weekly  He is able to do normal daily activities when he has a migraine.  The patient is taking the following rescue/relief medications:  Tylenol and other   Patient states \"I get some relief\" from the rescue/relief medications.   The patient is taking the following medications to prevent migraines:  No medications to prevent migraines  In the past 4 weeks, the patient has gone to an Urgent Care or Emergency Room 0 times times due to headaches.    He eats 0-1 servings of fruits and vegetables daily.He consumes 0 sweetened beverage(s) daily.He exercises with enough effort to increase his heart rate 20 to 29 minutes per day.  He exercises with enough effort to increase his heart rate 3 or less days per week.   He is taking medications regularly.          EMR reviewed including:             Complaint, History of Chief Complaint, Corresponding Review of Systems, and Complaint Specific Physical Examination.    #1   Follow-up on recent upper respiratory tract infection  Include antibiotic.  No fevers or chills.  Continue nasal stuffiness and congestion.  Minimal posterior nasal drainage.  Using Mucinex only.        Exam:   ENT: Pharynx is non-erythemous, minimal PND, there is significant nasal congestion, TM's not red or retracted, hearing intact bilaterally. No carotid bruits are heard. No JVD seen. Thyroid is not nodular or enlarged.   LUNGS: clear bilaterally, airflow is brisk, no intercostal retraction or stridor is noted. No coughing is noted during visit.   HEART:  regular without rubs, clicks, gallops, or murmurs. PMI is nondisplaced. Upstrokes are brisk. S1,S2 are heard.      #2   " Frequency and nocturia  Gets up about every 2 hours at night  Low flow small volume urinations  Previous PSA within normal limits.  No suprapubic or flank pain.        Exam:   URINARY: Rodney's punch is negative. No suprapubic tenderness is noted with palpation.      #3   History of mediastinal and hilar adenopathy  Due for surveillance CT.  Will schedule with patient.  Denies any cough, excess sputum production, or hemoptysis at this time.          Patient has been interviewed, applicable history and applied review of systems have been performed.    Vital Signs:   /80 (BP Location: Right arm, Patient Position: Chair, Cuff Size: Adult Large)   Pulse 63   Temp 98.3  F (36.8  C) (Temporal)   Resp 14   Ht 1.829 m (6')   Wt 123.4 kg (272 lb)   SpO2 96%   BMI 36.89 kg/m        Decision Making    Problem and Complexity     1. Seasonal allergic rhinitis, unspecified trigger  Recommend Flonase in addition to Claritin.  - fluticasone (FLONASE) 50 MCG/ACT nasal spray; Spray 1 spray into both nostrils daily  Dispense: 16 g; Refill: 3    2. Benign prostatic hyperplasia with nocturia  Avoid any decongestants.  Start Flomax and Proscar on a trial basis.  Rule out UTI (unlikely).  - UA Macroscopic with reflex to Microscopic and Culture - Lab Collect; Future  - tamsulosin (FLOMAX) 0.4 MG capsule; Take 1 capsule (0.4 mg) by mouth daily  Dispense: 30 capsule; Refill: 0  - finasteride (PROSCAR) 5 MG tablet; Take 1 tablet (5 mg) by mouth daily  Dispense: 30 tablet; Refill: 0    3. Hilar adenopathy  Set up CT for surveillance of hilar adenopathy  - CT Chest w/o Contrast; Future                                FOLLOW UP   I have asked the patient to make an appointment for followup with me in 1 month virtually    Regarding routine vaccinations:  I have reviewed the patient's vaccination schedule and discussed the benefits of prophylactic vaccination in detail.  I recommend the patient contact their pharmacist for  vaccinations.  Discussed that most insurance companies now favor reimbursement to the pharmacies and it will financially behoove the patient to have vaccinations performed at their pharmacy.        I have carefully explained the diagnosis and treatment options to the patient.  The patient has displayed an understanding of the above, and all subsequent questions were answered.      DO TIMMY Major    Portions of this note were produced using [employer]  Although every attempt at real-time proof reading has been made, occasional grammar/syntax errors may have been missed.

## 2024-01-18 ENCOUNTER — THERAPY VISIT (OUTPATIENT)
Dept: PHYSICAL THERAPY | Facility: CLINIC | Age: 74
End: 2024-01-18
Attending: ORTHOPAEDIC SURGERY
Payer: COMMERCIAL

## 2024-01-18 ENCOUNTER — HOSPITAL ENCOUNTER (OUTPATIENT)
Dept: CT IMAGING | Facility: CLINIC | Age: 74
Discharge: HOME | End: 2024-01-18
Attending: INTERNAL MEDICINE | Admitting: INTERNAL MEDICINE
Payer: COMMERCIAL

## 2024-01-18 ENCOUNTER — LAB (OUTPATIENT)
Dept: LAB | Facility: CLINIC | Age: 74
End: 2024-01-18
Payer: COMMERCIAL

## 2024-01-18 DIAGNOSIS — N40.1 BENIGN PROSTATIC HYPERPLASIA WITH NOCTURIA: ICD-10-CM

## 2024-01-18 DIAGNOSIS — R59.0 HILAR ADENOPATHY: ICD-10-CM

## 2024-01-18 DIAGNOSIS — Z96.652 HISTORY OF TOTAL KNEE ARTHROPLASTY, LEFT: ICD-10-CM

## 2024-01-18 DIAGNOSIS — R35.1 BENIGN PROSTATIC HYPERPLASIA WITH NOCTURIA: ICD-10-CM

## 2024-01-18 DIAGNOSIS — M97.9XXA: Primary | ICD-10-CM

## 2024-01-18 LAB
ALBUMIN UR-MCNC: NEGATIVE MG/DL
APPEARANCE UR: CLEAR
BILIRUB UR QL STRIP: NEGATIVE
COLOR UR AUTO: YELLOW
GLUCOSE UR STRIP-MCNC: NEGATIVE MG/DL
HGB UR QL STRIP: NEGATIVE
KETONES UR STRIP-MCNC: NEGATIVE MG/DL
LEUKOCYTE ESTERASE UR QL STRIP: NEGATIVE
NITRATE UR QL: NEGATIVE
PH UR STRIP: 5 [PH] (ref 5–7)
SP GR UR STRIP: 1.03 (ref 1–1.03)
UROBILINOGEN UR STRIP-MCNC: 2 MG/DL

## 2024-01-18 PROCEDURE — 250N000009 HC RX 250: Performed by: INTERNAL MEDICINE

## 2024-01-18 PROCEDURE — 71260 CT THORAX DX C+: CPT

## 2024-01-18 PROCEDURE — 250N000011 HC RX IP 250 OP 636: Performed by: INTERNAL MEDICINE

## 2024-01-18 PROCEDURE — 81003 URINALYSIS AUTO W/O SCOPE: CPT

## 2024-01-18 PROCEDURE — 97110 THERAPEUTIC EXERCISES: CPT | Mod: GP | Performed by: PHYSICAL THERAPIST

## 2024-01-18 RX ORDER — IOPAMIDOL 755 MG/ML
500 INJECTION, SOLUTION INTRAVASCULAR ONCE
Status: COMPLETED | OUTPATIENT
Start: 2024-01-18 | End: 2024-01-18

## 2024-01-18 RX ADMIN — SODIUM CHLORIDE 60 ML: 9 INJECTION, SOLUTION INTRAVENOUS at 15:48

## 2024-01-18 RX ADMIN — IOPAMIDOL 80 ML: 755 INJECTION, SOLUTION INTRAVENOUS at 15:49

## 2024-01-18 ASSESSMENT — ACTIVITIES OF DAILY LIVING (ADL)
SQUAT: ACTIVITY IS SOMEWHAT DIFFICULT
STAND: ACTIVITY IS MINIMALLY DIFFICULT
PAIN: I HAVE THE SYMPTOM BUT IT DOES NOT AFFECT MY ACTIVITY
HOW_WOULD_YOU_RATE_THE_CURRENT_FUNCTION_OF_YOUR_KNEE_DURING_YOUR_USUAL_DAILY_ACTIVITIES_ON_A_SCALE_FROM_0_TO_100_WITH_100_BEING_YOUR_LEVEL_OF_KNEE_FUNCTION_PRIOR_TO_YOUR_INJURY_AND_0_BEING_THE_INABILITY_TO_PERFORM_ANY_OF_YOUR_USUAL_DAILY_ACTIVITIES?: 85
KNEE_ACTIVITY_OF_DAILY_LIVING_SCORE: 78.57
RISE FROM A CHAIR: ACTIVITY IS MINIMALLY DIFFICULT
GO DOWN STAIRS: ACTIVITY IS MINIMALLY DIFFICULT
WEAKNESS: THE SYMPTOM AFFECTS MY ACTIVITY SLIGHTLY
KNEEL ON THE FRONT OF YOUR KNEE: ACTIVITY IS SOMEWHAT DIFFICULT
SWELLING: I HAVE THE SYMPTOM BUT IT DOES NOT AFFECT MY ACTIVITY
GIVING WAY, BUCKLING OR SHIFTING OF KNEE: I DO NOT HAVE THE SYMPTOM
LIMPING: I HAVE THE SYMPTOM BUT IT DOES NOT AFFECT MY ACTIVITY
STIFFNESS: I HAVE THE SYMPTOM BUT IT DOES NOT AFFECT MY ACTIVITY
SIT WITH YOUR KNEE BENT: ACTIVITY IS NOT DIFFICULT
WALK: ACTIVITY IS MINIMALLY DIFFICULT
HOW_WOULD_YOU_RATE_THE_OVERALL_FUNCTION_OF_YOUR_KNEE_DURING_YOUR_USUAL_DAILY_ACTIVITIES?: NEARLY NORMAL
RAW_SCORE: 55
KNEE_ACTIVITY_OF_DAILY_LIVING_SUM: 55
GO UP STAIRS: ACTIVITY IS MINIMALLY DIFFICULT
AS_A_RESULT_OF_YOUR_KNEE_INJURY,_HOW_WOULD_YOU_RATE_YOUR_CURRENT_LEVEL_OF_DAILY_ACTIVITY?: NEARLY NORMAL

## 2024-02-13 ENCOUNTER — MYC REFILL (OUTPATIENT)
Dept: INTERNAL MEDICINE | Facility: CLINIC | Age: 74
End: 2024-02-13
Payer: COMMERCIAL

## 2024-02-13 DIAGNOSIS — R35.1 BENIGN PROSTATIC HYPERPLASIA WITH NOCTURIA: ICD-10-CM

## 2024-02-13 DIAGNOSIS — N40.1 BENIGN PROSTATIC HYPERPLASIA WITH NOCTURIA: ICD-10-CM

## 2024-02-13 RX ORDER — TAMSULOSIN HYDROCHLORIDE 0.4 MG/1
0.4 CAPSULE ORAL DAILY
Qty: 30 CAPSULE | Refills: 0 | Status: SHIPPED | OUTPATIENT
Start: 2024-02-13 | End: 2024-03-13

## 2024-02-26 NOTE — PROGRESS NOTES
01/18/24 0500   Appointment Info   Signing clinician's name / credentials Leslye Tamez, PT, DPT   Visits Used [employer]   Medical Diagnosis Periprosthetic fracture around internal prosthetic left knee joint, sequela (M97.12XS); History of total knee arthroplasty, left (Z96.652)   PT Tx Diagnosis Decreased ROM, weakness, poor stability   Quick Adds Certification   Progress Note/Certification   Start of Care Date 10/16/23   Onset of illness/injury or Date of Surgery 01/04/23   Therapy Frequency 1x/week   Predicted Duration 8 weeks   Certification date from 12/12/23   Certification date to 02/10/24   Progress Note Due Date 02/10/24   Progress Note Completed Date 12/28/23   GOALS   PT Goals 2;3;4   PT Goal 1   Goal Identifier #1   Goal Description Pt will demonstrate AROM of the L knee 0-115 degs in order to progress to functional activities.   Rationale to maximize safety and independence with performance of ADLs and functional tasks;to maximize safety and independence within the home;to maximize safety and independence within the community   Goal Progress L knee: 2 degs to 112 degs.   Target Date 02/10/24   PT Goal 2   Goal Identifier #2   Goal Description Pt will demonstrate 4+/5 of L quad MMT testing in order to demonstrate ability to complete more functional activities like stairs.   Rationale to maximize safety and independence with performance of ADLs and functional tasks   Goal Progress 5/5 L quad strength   Target Date 02/10/24   Date Met 01/18/24   PT Goal 3   Goal Identifier #3   Goal Description Pt will demonstrate ability to negotiate stairs with reciprocal pattern and no pain in the L knee.   Rationale to maximize safety and independence within the home;to maximize safety and independence within the community   Goal Progress No pain with the stairs and getting easier.  Going step over step.   Target Date 02/10/24   Date Met 01/18/24   PT Goal 4   Goal Identifier #4   Goal  Description Pt will demonstrate ability to walk without pain in the L knee and no assistive device on even and uneven surfaces.   Rationale to maximize safety and independence within the community   Goal Progress No pain with walking.  No pain with going down hill, but more cautious.   Target Date 02/10/24   Date Met 01/18/24   Subjective Report   Subjective Report Pt states that he is doing better overall.  Feels he can continue on his own with his gym membership and equipment at home.   Objective Measure 1   Objective Measure Knee Outcome Survey   Details 78.57%   Objective Measure 2   Objective Measure ROM   Details L knee: 2 degs to 112 degs.   Objective Measure 3   Objective Measure Strength   Details 5/5 MMT   Treatment Interventions (PT)   Interventions Therapeutic Procedure/Exercise   Therapeutic Procedure/Exercise   Therapeutic Procedures: strength, endurance, ROM, flexibillity minutes (64399) 20   Ther Proc 1 - Details Nustep seat at level 9 and resistance level 10 x 12 mins.  Instructed pt on ways to progress HEP for strength, balance, and ROM.   Skilled Intervention HEP   Patient Response/Progress Demonstrates and verbalizes an understanding.   Education   Learner/Method Patient;Listening;Reading;Demonstration;Pictures/Video;No Barriers to Learning   Education Comments HEP via PTRx   Plan   Home program Standing against wall hip abd.  Prone knee flexion and extension stretching today. Tandem balance and SAQ.  Faizan and seated HS stretching, bridges with toes up, clamshells with only heels together added green band   Updates to plan of care Discharged from formal PT services.   Total Session Time   Timed Code Treatment Minutes 20   Total Treatment Time (sum of timed and untimed services) 20         DISCHARGE  Reason for Discharge: Patient has failed to schedule further appointments.    Equipment Issued: none    Discharge Plan: Patient to continue home program.    Referring Provider:  Tawanda Cohen  [place]

## 2024-03-13 ENCOUNTER — MYC MEDICAL ADVICE (OUTPATIENT)
Dept: INTERNAL MEDICINE | Facility: CLINIC | Age: 74
End: 2024-03-13
Payer: COMMERCIAL

## 2024-03-13 DIAGNOSIS — N40.1 BENIGN PROSTATIC HYPERPLASIA WITH NOCTURIA: ICD-10-CM

## 2024-03-13 DIAGNOSIS — R35.1 BENIGN PROSTATIC HYPERPLASIA WITH NOCTURIA: ICD-10-CM

## 2024-03-13 RX ORDER — TAMSULOSIN HYDROCHLORIDE 0.4 MG/1
0.4 CAPSULE ORAL DAILY
Qty: 30 CAPSULE | Refills: 0 | Status: SHIPPED | OUTPATIENT
Start: 2024-03-13 | End: 2024-03-19

## 2024-03-19 ENCOUNTER — MYC MEDICAL ADVICE (OUTPATIENT)
Dept: INTERNAL MEDICINE | Facility: CLINIC | Age: 74
End: 2024-03-19
Payer: COMMERCIAL

## 2024-03-19 DIAGNOSIS — N40.1 BENIGN PROSTATIC HYPERPLASIA WITH NOCTURIA: ICD-10-CM

## 2024-03-19 DIAGNOSIS — R35.1 BENIGN PROSTATIC HYPERPLASIA WITH NOCTURIA: ICD-10-CM

## 2024-03-19 RX ORDER — TAMSULOSIN HYDROCHLORIDE 0.4 MG/1
0.4 CAPSULE ORAL DAILY
Qty: 90 CAPSULE | Refills: 3 | Status: SHIPPED | OUTPATIENT
Start: 2024-03-19

## 2024-03-19 NOTE — TELEPHONE ENCOUNTER
Sent message to provider to continue with conversation.     Sent message to provider for review and approval/denial.     Marilee Lemus RN on 3/19/2024 at 11:00 AM

## 2024-04-26 ENCOUNTER — PATIENT OUTREACH (OUTPATIENT)
Dept: CARE COORDINATION | Facility: CLINIC | Age: 74
End: 2024-04-26
Payer: COMMERCIAL

## 2024-05-10 ENCOUNTER — PATIENT OUTREACH (OUTPATIENT)
Dept: CARE COORDINATION | Facility: CLINIC | Age: 74
End: 2024-05-10
Payer: COMMERCIAL

## 2024-06-13 ENCOUNTER — OFFICE VISIT (OUTPATIENT)
Dept: ORTHOPEDICS | Facility: OTHER | Age: 74
End: 2024-06-13
Payer: COMMERCIAL

## 2024-06-13 ENCOUNTER — ANCILLARY PROCEDURE (OUTPATIENT)
Dept: GENERAL RADIOLOGY | Facility: OTHER | Age: 74
End: 2024-06-13
Attending: ORTHOPAEDIC SURGERY
Payer: COMMERCIAL

## 2024-06-13 VITALS — BODY MASS INDEX: 37.25 KG/M2 | WEIGHT: 275 LBS | HEIGHT: 72 IN | RESPIRATION RATE: 18 BRPM

## 2024-06-13 DIAGNOSIS — Z96.652 HISTORY OF TOTAL KNEE ARTHROPLASTY, LEFT: Primary | ICD-10-CM

## 2024-06-13 DIAGNOSIS — Z96.652 HISTORY OF TOTAL KNEE ARTHROPLASTY, LEFT: ICD-10-CM

## 2024-06-13 DIAGNOSIS — T14.8XXA CONTUSION OF BONE: ICD-10-CM

## 2024-06-13 PROCEDURE — 73562 X-RAY EXAM OF KNEE 3: CPT | Mod: TC | Performed by: RADIOLOGY

## 2024-06-13 PROCEDURE — 99214 OFFICE O/P EST MOD 30 MIN: CPT | Performed by: ORTHOPAEDIC SURGERY

## 2024-06-13 NOTE — LETTER
6/13/2024      Jarrod Lewis  [address]  [place] [address]      Dear Colleague,    Thank you for referring your patient, Jarrod Lewis, to the [facility] [place]. Please see a copy of my visit note below.    Jarrod Lewis is a 73 year old male who is seen as self referral for left knee pain.  He has had prior left total knee arthroplasty in 2007.  He then sustained a left distal femur fracture internally fixed with intramedullary karely in January 2023.  Because of prominent screws and pain, 2 distal screws were removed on 12/22/2023.  He had done well after that and had returned to activities such as golfing.  About June 6 he fell to his knees.  He did not immediately have pain.  Pain really started about 2 days later while driving, and became fairly intense over the inner aspect of his left knee.  He had been thinking about coming back anyway for recheck of his knee.  He decided to come now due to his pain.  His pain has improved a bit since the weekend.    X-ray shows total knee arthroplasty in good position.  The fracture has healed well and bone is consolidated nicely.  He has the intramedullary karely in place with several distal screws.  We had previously removed the most prominent screws.    Past Medical History:   Diagnosis Date     Arthritis      Duodenal ulcer      Gastric ulcer      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 01/02/2017    Left cheek       Past Surgical History:   Procedure Laterality Date     COLONOSCOPY  12/14/2012    Procedure: COLONOSCOPY;  Colonoscopy;  Surgeon: Sylvester Lucas MD;  Location:  GI     COLONOSCOPY N/A 10/26/2016    Procedure: COMBINED COLONOSCOPY, SINGLE OR MULTIPLE BIOPSY/POLYPECTOMY BY BIOPSY;  Surgeon: Trev Oquendo MD;  Location:  GI     ESOPHAGOSCOPY, GASTROSCOPY, DUODENOSCOPY (EGD), COMBINED N/A 4/24/2017    Procedure: COMBINED ESOPHAGOSCOPY, GASTROSCOPY, DUODENOSCOPY (EGD), BIOPSY SINGLE OR  MULTIPLE;  ESOPHAGOSCOPY, GASTROSCOPY, DUODENOSCOPY (EGD) with biopsies by forceps;  Surgeon: Puma Dominguez MD;  Location: [facility]     ESOPHAGOSCOPY, GASTROSCOPY, DUODENOSCOPY (EGD), COMBINED N/A 2017    Procedure: COMBINED ESOPHAGOSCOPY, GASTROSCOPY, DUODENOSCOPY (EGD);  ESOPHAGOSCOPY, GASTROSCOPY, DUODENOSCOPY (EGD);  Surgeon: Sylvester Lucas MD;  Location:  GI     ESOPHAGOSCOPY, GASTROSCOPY, DUODENOSCOPY (EGD), COMBINED N/A 2019    Procedure: ESOPHAGOGASTRODUODENOSCOPY (EGD);  Surgeon: Artur Bruce MD;  Location:  GI     HERNIORRHAPHY INGUINAL Right 3/8/2017    Procedure: HERNIORRHAPHY INGUINAL;  Surgeon: Kong Lassiter MD;  Location: [facility]     JOINT REPLACEMENT, HIP RT/LT Right 2012     MOHS MICROGRAPHIC PROCEDURE Left 2017    Left cheek     REMOVE HARDWARE KNEE Left 2023    Procedure: REMOVAL, screws from left  KNEE;  Surgeon: Puma Clemente MD;  Location:  OR     ZC TOTAL KNEE ARTHROPLASTY Bilateral        Family History   Problem Relation Age of Onset     Stomach Cancer Father          at age 46       Social History     Socioeconomic History     Marital status: Single     Spouse name: Not on file     Number of children: Not on file     Years of education: Not on file     Highest education level: Not on file   Occupational History     Occupation:    Tobacco Use     Smoking status: Former     Types: Pipe     Quit date:      Years since quittin.4     Smokeless tobacco: Never   Vaping Use     Vaping status: Never Used   Substance and Sexual Activity     Alcohol use: Yes     Alcohol/week: 0.0 standard drinks of alcohol     Comment: occasional     Drug use: No     Sexual activity: Yes     Partners: Female   Other Topics Concern     Parent/sibling w/ CABG, MI or angioplasty before 65F 55M? Not Asked   Social History Narrative     Not on file     Social Determinants of Health     Financial Resource Strain: Low Risk  (2024)    Financial  Resource Strain      Within the past 12 months, have you or your family members you live with been unable to get utilities (heat, electricity) when it was really needed?: No   Food Insecurity: Low Risk  (1/16/2024)    Food Insecurity      Within the past 12 months, did you worry that your food would run out before you got money to buy more?: No      Within the past 12 months, did the food you bought just not last and you didn t have money to get more?: No   Transportation Needs: Low Risk  (1/16/2024)    Transportation Needs      Within the past 12 months, has lack of transportation kept you from medical appointments, getting your medicines, non-medical meetings or appointments, work, or from getting things that you need?: No   Physical Activity: Not on file   Stress: Not on file   Social Connections: Unknown (4/11/2023)    Received from [facility], [facility]    Social Connections      Frequency of Communication with Friends and Family: Not on file   Interpersonal Safety: Not At Risk (1/14/2024)    [facility], [facility]    Intimate Partner Violence      Physically hurt, threatened and/or made to feel afraid: No   Housing Stability: Low Risk  (1/16/2024)    Housing Stability      Do you have housing? : Yes      Are you worried about losing your housing?: No       Current Outpatient Medications   Medication Sig Dispense Refill     acetaminophen (TYLENOL) 325 MG tablet Take 325-650 mg by mouth every 4 hours as needed for mild pain or fever Reported on 3/6/2017 100 tablet      finasteride (PROSCAR) 5 MG tablet Take 1 tablet (5 mg) by mouth daily 30 tablet 0     fluticasone (FLONASE) 50 MCG/ACT nasal spray Spray 1 spray into both nostrils daily 16 g 3     loratadine (CLARITIN) 10 MG tablet Take 10 mg by mouth daily       tamsulosin (FLOMAX) 0.4 MG capsule Take 1 capsule (0.4 mg) by mouth daily 90  capsule 3       Allergies   Allergen Reactions     Dust Mites      Hydrocodone-Acetaminophen Other (See Comments)     nightmares     No Clinical Screening - See Comments Other (See Comments)     Penicillins Unknown     As a child       Zithromax [Azithromycin Dihydrate]      diarrhea       REVIEW OF SYSTEMS:  CONSTITUTIONAL:  NEGATIVE for fever, chills, change in weight, not feeling tired  SKIN:  NEGATIVE for worrisome rashes, no skin lumps, no skin ulcers and no non-healing wounds  EYES:  NEGATIVE for vision changes or irritation.  ENT/MOUTH:  NEGATIVE.  No hearing loss, no hoarseness, no difficulty swallowing.  RESP:  NEGATIVE. No cough or shortness of breath.  CV:  NEGATIVE for chest pain, palpitations or peripheral edema  GI:  NEGATIVE for nausea, abdominal pain, heartburn, or change in bowel habits  :  Negative. No dysuria, no hematuria  MUSCULOSKELETAL:  See HPI above  NEURO:  NEGATIVE . No headaches, no dizziness,  no numbness  ENDOCRINE:  NEGATIVE for temperature intolerance, skin/hair changes  HEME/ALLERGY/IMMUNE:  NEGATIVE for bleeding problems  PSYCHIATRIC:  NEGATIVE. no anxiety, no depression.     Exam:  Vitals: Resp 18   Ht 1.829 m (6')   Wt 124.7 kg (275 lb)   BMI 37.30 kg/m    BMI= Body mass index is 37.3 kg/m .  Constitutional:  healthy, alert and no distress  Neuro: Alert and Oriented x 3, no focal defects.  Psych: Affect normal   Respiratory: Breathing not labored.  Cardiovascular: normal peripheral pulses  Lymph: no adenopathy  Skin: No rashes,worrisome lesions or skin problems  He has some medial joint tenderness at the joint line.  There is also mild tenderness over the distal femur.  There is some crepitation both medially and laterally with range of motion of the knee.  This is not painful.  This is likely scarring from soft tissue over the fractured bone and joint.  He has no pain with varus and valgus stress.  There is no ligamentous instability.  There is no effusion.    Assessment:   likely left knee bone contusion.    This should resolve with time.  Resume activities as comfortable.      Again, thank you for allowing me to participate in the care of your patient.        Sincerely,        Puma Clemente MD

## 2024-06-13 NOTE — PROGRESS NOTES
Jarrod Lewis is a 73 year old male who is seen as self referral for left knee pain.  He has had prior left total knee arthroplasty in 2007.  He then sustained a left distal femur fracture internally fixed with intramedullary karely in January 2023.  Because of prominent screws and pain, 2 distal screws were removed on 12/22/2023.  He had done well after that and had returned to activities such as golfing.  About June 6 he fell to his knees.  He did not immediately have pain.  Pain really started about 2 days later while driving, and became fairly intense over the inner aspect of his left knee.  He had been thinking about coming back anyway for recheck of his knee.  He decided to come now due to his pain.  His pain has improved a bit since the weekend.    X-ray shows total knee arthroplasty in good position.  The fracture has healed well and bone is consolidated nicely.  He has the intramedullary karely in place with several distal screws.  We had previously removed the most prominent screws.    Past Medical History:   Diagnosis Date    Arthritis     Duodenal ulcer     Gastric ulcer     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 01/02/2017    Left cheek       Past Surgical History:   Procedure Laterality Date    COLONOSCOPY  12/14/2012    Procedure: COLONOSCOPY;  Colonoscopy;  Surgeon: Sylvester Lucas MD;  Location: [facility]    COLONOSCOPY N/A 10/26/2016    Procedure: COMBINED COLONOSCOPY, SINGLE OR MULTIPLE BIOPSY/POLYPECTOMY BY BIOPSY;  Surgeon: Trev Oquendo MD;  Location:  GI    ESOPHAGOSCOPY, GASTROSCOPY, DUODENOSCOPY (EGD), COMBINED N/A 4/24/2017    Procedure: COMBINED ESOPHAGOSCOPY, GASTROSCOPY, DUODENOSCOPY (EGD), BIOPSY SINGLE OR MULTIPLE;  ESOPHAGOSCOPY, GASTROSCOPY, DUODENOSCOPY (EGD) with biopsies by forceps;  Surgeon: Puma Dominguez MD;  Location:  GI    ESOPHAGOSCOPY, GASTROSCOPY, DUODENOSCOPY (EGD), COMBINED N/A 6/12/2017    Procedure: COMBINED ESOPHAGOSCOPY,  GASTROSCOPY, DUODENOSCOPY (EGD);  ESOPHAGOSCOPY, GASTROSCOPY, DUODENOSCOPY (EGD);  Surgeon: Sylvester Lucas MD;  Location: [facility]    ESOPHAGOSCOPY, GASTROSCOPY, DUODENOSCOPY (EGD), COMBINED N/A 2019    Procedure: ESOPHAGOGASTRODUODENOSCOPY (EGD);  Surgeon: Artur Bruce MD;  Location: [facility]    HERNIORRHAPHY INGUINAL Right 3/8/2017    Procedure: HERNIORRHAPHY INGUINAL;  Surgeon: Kong Lassiter MD;  Location: [facility]    JOINT REPLACEMENT, HIP RT/LT Right 2012    MOHS MICROGRAPHIC PROCEDURE Left 2017    Left cheek    REMOVE HARDWARE KNEE Left 2023    Procedure: REMOVAL, screws from left  KNEE;  Surgeon: Puma Clemente MD;  Location: [facility]    ZZC TOTAL KNEE ARTHROPLASTY Bilateral        Family History   Problem Relation Age of Onset    Stomach Cancer Father          at age 46       Social History     Socioeconomic History    Marital status: Single     Spouse name: Not on file    Number of children: Not on file    Years of education: Not on file    Highest education level: Not on file   Occupational History    Occupation:    Tobacco Use    Smoking status: Former     Types: Pipe     Quit date:      Years since quittin.4    Smokeless tobacco: Never   Vaping Use    Vaping status: Never Used   Substance and Sexual Activity    Alcohol use: Yes     Alcohol/week: 0.0 standard drinks of alcohol     Comment: occasional    Drug use: No    Sexual activity: Yes     Partners: Female   Other Topics Concern    Parent/sibling w/ CABG, MI or angioplasty before 65F 55M? Not Asked   Social History Narrative    Not on file     Social Determinants of Health     Financial Resource Strain: Low Risk  (2024)    Financial Resource Strain     Within the past 12 months, have you or your family members you live with been unable to get utilities (heat, electricity) when it was really needed?: No   Food Insecurity: Low Risk  (2024)    Food Insecurity     Within the past 12 months,  did you worry that your food would run out before you got money to buy more?: No     Within the past 12 months, did the food you bought just not last and you didn t have money to get more?: No   Transportation Needs: Low Risk  (1/16/2024)    Transportation Needs     Within the past 12 months, has lack of transportation kept you from medical appointments, getting your medicines, non-medical meetings or appointments, work, or from getting things that you need?: No   Physical Activity: Not on file   Stress: Not on file   Social Connections: Unknown (4/11/2023)    Received from [facility]    Social Connections     Frequency of Communication with Friends and Family: Not on file   Interpersonal Safety: Not At Risk (1/14/2024)    [facility] from [facility], [facility]    Intimate Partner Violence     Physically hurt, threatened and/or made to feel afraid: No   Housing Stability: Low Risk  (1/16/2024)    Housing Stability     Do you have housing? : Yes     Are you worried about losing your housing?: No       Current Outpatient Medications   Medication Sig Dispense Refill    acetaminophen (TYLENOL) 325 MG tablet Take 325-650 mg by mouth every 4 hours as needed for mild pain or fever Reported on 3/6/2017 100 tablet     finasteride (PROSCAR) 5 MG tablet Take 1 tablet (5 mg) by mouth daily 30 tablet 0    fluticasone (FLONASE) 50 MCG/ACT nasal spray Spray 1 spray into both nostrils daily 16 g 3    loratadine (CLARITIN) 10 MG tablet Take 10 mg by mouth daily      tamsulosin (FLOMAX) 0.4 MG capsule Take 1 capsule (0.4 mg) by mouth daily 90 capsule 3       Allergies   Allergen Reactions    Dust Mites     Hydrocodone-Acetaminophen Other (See Comments)     nightmares    No Clinical Screening - See Comments Other (See Comments)    Penicillins Unknown     As a child      Zithromax [Azithromycin Dihydrate]       diarrhea       REVIEW OF SYSTEMS:  CONSTITUTIONAL:  NEGATIVE for fever, chills, change in weight, not feeling tired  SKIN:  NEGATIVE for worrisome rashes, no skin lumps, no skin ulcers and no non-healing wounds  EYES:  NEGATIVE for vision changes or irritation.  ENT/MOUTH:  NEGATIVE.  No hearing loss, no hoarseness, no difficulty swallowing.  RESP:  NEGATIVE. No cough or shortness of breath.  CV:  NEGATIVE for chest pain, palpitations or peripheral edema  GI:  NEGATIVE for nausea, abdominal pain, heartburn, or change in bowel habits  :  Negative. No dysuria, no hematuria  MUSCULOSKELETAL:  See HPI above  NEURO:  NEGATIVE . No headaches, no dizziness,  no numbness  ENDOCRINE:  NEGATIVE for temperature intolerance, skin/hair changes  HEME/ALLERGY/IMMUNE:  NEGATIVE for bleeding problems  PSYCHIATRIC:  NEGATIVE. no anxiety, no depression.     Exam:  Vitals: Resp 18   Ht 1.829 m (6')   Wt 124.7 kg (275 lb)   BMI 37.30 kg/m    BMI= Body mass index is 37.3 kg/m .  Constitutional:  healthy, alert and no distress  Neuro: Alert and Oriented x 3, no focal defects.  Psych: Affect normal   Respiratory: Breathing not labored.  Cardiovascular: normal peripheral pulses  Lymph: no adenopathy  Skin: No rashes,worrisome lesions or skin problems  He has some medial joint tenderness at the joint line.  There is also mild tenderness over the distal femur.  There is some crepitation both medially and laterally with range of motion of the knee.  This is not painful.  This is likely scarring from soft tissue over the fractured bone and joint.  He has no pain with varus and valgus stress.  There is no ligamentous instability.  There is no effusion.    Assessment:  likely left knee bone contusion.    This should resolve with time.  Resume activities as comfortable.

## 2024-08-31 ENCOUNTER — HEALTH MAINTENANCE LETTER (OUTPATIENT)
Age: 74
End: 2024-08-31

## 2025-01-15 NOTE — PLAN OF CARE
No events overnight. VSS on room air. No stools. Will receive PO Protonix this morning. Plan pending Hgb level. Up independently, calls as needed.   97

## 2025-01-27 ENCOUNTER — OFFICE VISIT (OUTPATIENT)
Dept: INTERNAL MEDICINE | Facility: CLINIC | Age: 75
End: 2025-01-27
Payer: COMMERCIAL

## 2025-01-27 VITALS
WEIGHT: 275.8 LBS | SYSTOLIC BLOOD PRESSURE: 135 MMHG | RESPIRATION RATE: 15 BRPM | HEART RATE: 82 BPM | TEMPERATURE: 98.3 F | DIASTOLIC BLOOD PRESSURE: 85 MMHG | OXYGEN SATURATION: 98 % | BODY MASS INDEX: 36.55 KG/M2 | HEIGHT: 73 IN

## 2025-01-27 DIAGNOSIS — Z00.00 MEDICARE ANNUAL WELLNESS VISIT, SUBSEQUENT: Primary | ICD-10-CM

## 2025-01-27 DIAGNOSIS — Z12.5 SCREENING FOR PROSTATE CANCER: ICD-10-CM

## 2025-01-27 DIAGNOSIS — E78.5 HYPERLIPIDEMIA LDL GOAL <130: ICD-10-CM

## 2025-01-27 DIAGNOSIS — N40.1 BENIGN PROSTATIC HYPERPLASIA WITH NOCTURIA: ICD-10-CM

## 2025-01-27 DIAGNOSIS — R35.1 BENIGN PROSTATIC HYPERPLASIA WITH NOCTURIA: ICD-10-CM

## 2025-01-27 LAB
ALBUMIN UR-MCNC: NEGATIVE MG/DL
ANION GAP SERPL CALCULATED.3IONS-SCNC: 10 MMOL/L (ref 7–15)
APPEARANCE UR: CLEAR
BILIRUB UR QL STRIP: NEGATIVE
BUN SERPL-MCNC: 19.5 MG/DL (ref 8–23)
CALCIUM SERPL-MCNC: 8.9 MG/DL (ref 8.8–10.4)
CHLORIDE SERPL-SCNC: 106 MMOL/L (ref 98–107)
CHOLEST SERPL-MCNC: 151 MG/DL
COLOR UR AUTO: YELLOW
CREAT SERPL-MCNC: 1.05 MG/DL (ref 0.67–1.17)
EGFRCR SERPLBLD CKD-EPI 2021: 75 ML/MIN/1.73M2
ERYTHROCYTE [DISTWIDTH] IN BLOOD BY AUTOMATED COUNT: 12.5 % (ref 10–15)
FASTING STATUS PATIENT QL REPORTED: YES
FASTING STATUS PATIENT QL REPORTED: YES
GLUCOSE SERPL-MCNC: 106 MG/DL (ref 70–99)
GLUCOSE UR STRIP-MCNC: NEGATIVE MG/DL
HCO3 SERPL-SCNC: 23 MMOL/L (ref 22–29)
HCT VFR BLD AUTO: 40.6 % (ref 40–53)
HDLC SERPL-MCNC: 38 MG/DL
HGB BLD-MCNC: 14.3 G/DL (ref 13.3–17.7)
HGB UR QL STRIP: ABNORMAL
HYALINE CASTS: 3 /LPF
KETONES UR STRIP-MCNC: NEGATIVE MG/DL
LDLC SERPL CALC-MCNC: 91 MG/DL
LEUKOCYTE ESTERASE UR QL STRIP: NEGATIVE
MCH RBC QN AUTO: 30.9 PG (ref 26.5–33)
MCHC RBC AUTO-ENTMCNC: 35.2 G/DL (ref 31.5–36.5)
MCV RBC AUTO: 88 FL (ref 78–100)
MUCOUS THREADS #/AREA URNS LPF: PRESENT /LPF
NITRATE UR QL: NEGATIVE
NONHDLC SERPL-MCNC: 113 MG/DL
PH UR STRIP: 5.5 [PH] (ref 5–7)
PLATELET # BLD AUTO: 211 10E3/UL (ref 150–450)
POTASSIUM SERPL-SCNC: 4.3 MMOL/L (ref 3.4–5.3)
PSA SERPL DL<=0.01 NG/ML-MCNC: 1.34 NG/ML (ref 0–6.5)
RBC # BLD AUTO: 4.63 10E6/UL (ref 4.4–5.9)
RBC URINE: 1 /HPF
SODIUM SERPL-SCNC: 139 MMOL/L (ref 135–145)
SP GR UR STRIP: 1.02 (ref 1–1.03)
TRIGL SERPL-MCNC: 110 MG/DL
UROBILINOGEN UR STRIP-MCNC: NORMAL MG/DL
WBC # BLD AUTO: 4.3 10E3/UL (ref 4–11)
WBC URINE: 1 /HPF

## 2025-01-27 PROCEDURE — G0103 PSA SCREENING: HCPCS | Performed by: INTERNAL MEDICINE

## 2025-01-27 PROCEDURE — 80048 BASIC METABOLIC PNL TOTAL CA: CPT | Performed by: INTERNAL MEDICINE

## 2025-01-27 PROCEDURE — 80061 LIPID PANEL: CPT | Performed by: INTERNAL MEDICINE

## 2025-01-27 PROCEDURE — 36415 COLL VENOUS BLD VENIPUNCTURE: CPT | Performed by: INTERNAL MEDICINE

## 2025-01-27 PROCEDURE — 99213 OFFICE O/P EST LOW 20 MIN: CPT | Mod: 25 | Performed by: INTERNAL MEDICINE

## 2025-01-27 PROCEDURE — G0439 PPPS, SUBSEQ VISIT: HCPCS | Performed by: INTERNAL MEDICINE

## 2025-01-27 PROCEDURE — 81001 URINALYSIS AUTO W/SCOPE: CPT | Performed by: INTERNAL MEDICINE

## 2025-01-27 PROCEDURE — 85027 COMPLETE CBC AUTOMATED: CPT | Performed by: INTERNAL MEDICINE

## 2025-01-27 SDOH — HEALTH STABILITY: PHYSICAL HEALTH: ON AVERAGE, HOW MANY DAYS PER WEEK DO YOU ENGAGE IN MODERATE TO STRENUOUS EXERCISE (LIKE A BRISK WALK)?: 4 DAYS

## 2025-01-27 ASSESSMENT — ACTIVITIES OF DAILY LIVING (ADL): CURRENT_FUNCTION: NO ASSISTANCE NEEDED

## 2025-01-27 ASSESSMENT — PAIN SCALES - GENERAL: PAINLEVEL_OUTOF10: NO PAIN (0)

## 2025-01-27 ASSESSMENT — SOCIAL DETERMINANTS OF HEALTH (SDOH): HOW OFTEN DO YOU GET TOGETHER WITH FRIENDS OR RELATIVES?: ONCE A WEEK

## 2025-01-27 NOTE — PROGRESS NOTES
"Preventive Care Visit  Formerly [facility]  Adria Cowart DO, Internal Medicine  Jan 27, 2025      Assessment & Plan     Medicare annual wellness visit, subsequent      Hyperlipidemia LDL goal <130    - CBC with platelets; Future  - Basic metabolic panel  (Ca, Cl, CO2, Creat, Gluc, K, Na, BUN); Future  - Lipid panel reflex to direct LDL Fasting; Future    Benign prostatic hyperplasia with nocturia  Medication is no longer working.  Will set patient up for urologic consultation and potential surgery.  - Adult Urology  Referral; Future  - UA Macroscopic with reflex to Microscopic and Culture - Lab Collect; Future  - OFFICE/OUTPT VISIT,EST,LEVL III    Screening for prostate cancer    - PSA, screen; Future    Patient has been advised of split billing requirements and indicates understanding: Yes        BMI  Estimated body mass index is 36.39 kg/m  as calculated from the following:    Height as of this encounter: 1.854 m (6' 1\").    Weight as of this encounter: 125.1 kg (275 lb 12.8 oz).       Counseling  Appropriate preventive services were addressed with this patient via screening, questionnaire, or discussion as appropriate for fall prevention, nutrition, physical activity, Tobacco-use cessation, social engagement, weight loss and cognition.  Checklist reviewing preventive services available has been given to the patient.  Reviewed patient's diet, addressing concerns and/or questions.   Discussed possible causes of fatigue. The patient was provided with written information regarding signs of hearing loss.   Information on urinary incontinence and treatment options given to patient.       MEDICATIONS:  Continue current medications without change  Work on weight loss  Regular exercise    Charu Melendez is a 74 year old, presenting for the following:  Physical        1/27/2025     7:40 AM   Additional Questions   Roomed by Ninoska CAMPOS           Healthy Habits:     In general, " "how would you rate your overall health?  Good    Frequency of exercise:  2-3 days/week    Duration of exercise:  30-45 minutes    Do you usually eat at least 4 servings of fruit and vegetables a day, include whole grains    & fiber and avoid regularly eating high fat or \"junk\" foods?  Yes    Taking medications regularly:  Yes    Barriers to taking medications:  None    Medication side effects:  None    Ability to successfully perform activities of daily living:  No assistance needed    Home Safety:  No safety concerns identified    Hearing Impairment:  Feel that people are mumbling or not speaking clearly    In the past 6 months, have you been bothered by leaking of urine? Yes    In general, how would you rate your overall mental or emotional health?  Excellent    Additional concerns today:  No            Health Care Directive  Patient does not have a Health Care Directive: Discussed advance care planning with patient; however, patient declined at this time.      1/27/2025   General Health   How would you rate your overall physical health? Good   Feel stress (tense, anxious, or unable to sleep) Only a little   (!) STRESS CONCERN      1/27/2025   Nutrition   Diet: Regular (no restrictions)         1/27/2025   Exercise   Days per week of moderate/strenous exercise 4 days         1/27/2025   Social Factors   Frequency of gathering with friends or relatives Once a week   Worry food won't last until get money to buy more No   Food not last or not have enough money for food? No   Do you have housing? (Housing is defined as stable permanent housing and does not include staying ouside in a car, in a tent, in an abandoned building, in an overnight shelter, or couch-surfing.) Yes   Are you worried about losing your housing? No   Lack of transportation? No   Unable to get utilities (heat,electricity)? No         1/27/2025   Fall Risk   Fallen 2 or more times in the past year? No   Trouble with walking or balance? Yes   Reason " Gait Speed Test Not Completed Patient declines          2025   Activities of Daily Living- Home Safety   Needs help with the following daily activites None of the above   Safety concerns in the home None of the above         2025   Dental   Dentist two times every year? Yes         2025   Hearing Screening   Hearing concerns? (!) IT'S HARD TO FOLLOW A CONVERSATION IN A NOISY RESTAURANT OR CROWDED ROOM.         2025   Driving Risk Screening   Patient/family members have concerns about driving No         2025   General Alertness/Fatigue Screening   Have you been more tired than usual lately? (!) YES         2025   Urinary Incontinence Screening   Bothered by leaking urine in past 6 months Yes         2025   TB Screening   Were you born outside of the [place]? No         Today's PHQ-2 Score:       2025     7:23 AM   PHQ-2 (  [employer])   Q1: Little interest or pleasure in doing things 1   Q2: Feeling down, depressed or hopeless 0   PHQ-2 Score 1    Q1: Little interest or pleasure in doing things Several days   Q2: Feeling down, depressed or hopeless Not at all   PHQ-2 Score 1       Patient-reported           2025   Substance Use   Alcohol more than 3/day or more than 7/wk No   Do you have a current opioid prescription? No   How severe/bad is pain from 1 to 10? 1/10   Do you use any other substances recreationally? No     Social History     Tobacco Use    Smoking status: Former     Types: Pipe     Quit date:      Years since quittin.0    Smokeless tobacco: Never   Vaping Use    Vaping status: Never Used   Substance Use Topics    Alcohol use: Yes     Alcohol/week: 0.0 standard drinks of alcohol     Comment: occasional    Drug use: No       ASCVD Risk   The 10-year ASCVD risk score (Nita BURRELL, et al., 2019) is: 26%    Values used to calculate the score:      Age: 74 years      Sex: Male      Is Non- : No      Diabetic: No      Tobacco  smoker: No      Systolic Blood Pressure: 153 mmHg      Is BP treated: No      HDL Cholesterol: 47 mg/dL      Total Cholesterol: 143 mg/dL            Reviewed and updated as needed this visit by Provider                    Past Medical History:   Diagnosis Date    Arthritis     Duodenal ulcer     Gastric ulcer     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 01/02/2017    Left cheek     Past Surgical History:   Procedure Laterality Date    COLONOSCOPY  12/14/2012    Procedure: COLONOSCOPY;  Colonoscopy;  Surgeon: Sylvester Lucas MD;  Location: [facility]    COLONOSCOPY N/A 10/26/2016    Procedure: COMBINED COLONOSCOPY, SINGLE OR MULTIPLE BIOPSY/POLYPECTOMY BY BIOPSY;  Surgeon: Trev Oquendo MD;  Location:  GI    ESOPHAGOSCOPY, GASTROSCOPY, DUODENOSCOPY (EGD), COMBINED N/A 4/24/2017    Procedure: COMBINED ESOPHAGOSCOPY, GASTROSCOPY, DUODENOSCOPY (EGD), BIOPSY SINGLE OR MULTIPLE;  ESOPHAGOSCOPY, GASTROSCOPY, DUODENOSCOPY (EGD) with biopsies by forceps;  Surgeon: Puma Dominguez MD;  Location:  GI    ESOPHAGOSCOPY, GASTROSCOPY, DUODENOSCOPY (EGD), COMBINED N/A 6/12/2017    Procedure: COMBINED ESOPHAGOSCOPY, GASTROSCOPY, DUODENOSCOPY (EGD);  ESOPHAGOSCOPY, GASTROSCOPY, DUODENOSCOPY (EGD);  Surgeon: Sylvester Lucas MD;  Location:  GI    ESOPHAGOSCOPY, GASTROSCOPY, DUODENOSCOPY (EGD), COMBINED N/A 5/1/2019    Procedure: ESOPHAGOGASTRODUODENOSCOPY (EGD);  Surgeon: Artur Bruce MD;  Location:  GI    HERNIORRHAPHY INGUINAL Right 3/8/2017    Procedure: HERNIORRHAPHY INGUINAL;  Surgeon: Kong Lassiter MD;  Location:  OR    JOINT REPLACEMENT, HIP RT/LT Right 02/03/2012    MOHS MICROGRAPHIC PROCEDURE Left 01/02/2017    Left cheek    REMOVE HARDWARE KNEE Left 12/22/2023    Procedure: REMOVAL, screws from left  KNEE;  Surgeon: Puma Clemente MD;  Location:  OR    [facility] TOTAL KNEE ARTHROPLASTY Bilateral      Lab work is in process  Labs reviewed in EPIC  BP Readings from Last  3 Encounters:   01/27/25 135/85   01/16/24 138/80   01/04/24 138/73    Wt Readings from Last 3 Encounters:   01/27/25 125.1 kg (275 lb 12.8 oz)   06/13/24 124.7 kg (275 lb)   01/16/24 123.4 kg (272 lb)                  Patient Active Problem List   Diagnosis    CARDIOVASCULAR SCREENING; LDL GOAL LESS THAN 130    Reducible right inguinal hernia    GI bleed    Allergic rhinitis due to dust mite    Sinus pressure    Seasonal allergic rhinitis due to pollen    Allergic rhinitis    Penicillin allergy    Upper GI bleed    Fracture of bone adjacent to prosthesis    History of total knee arthroplasty, left    Painful orthopaedic hardware    Orthostatic hypotension    Mediastinal mass     Past Surgical History:   Procedure Laterality Date    COLONOSCOPY  12/14/2012    Procedure: COLONOSCOPY;  Colonoscopy;  Surgeon: Sylvester Lucas MD;  Location:  GI    COLONOSCOPY N/A 10/26/2016    Procedure: COMBINED COLONOSCOPY, SINGLE OR MULTIPLE BIOPSY/POLYPECTOMY BY BIOPSY;  Surgeon: Trev Oquendo MD;  Location:  GI    ESOPHAGOSCOPY, GASTROSCOPY, DUODENOSCOPY (EGD), COMBINED N/A 4/24/2017    Procedure: COMBINED ESOPHAGOSCOPY, GASTROSCOPY, DUODENOSCOPY (EGD), BIOPSY SINGLE OR MULTIPLE;  ESOPHAGOSCOPY, GASTROSCOPY, DUODENOSCOPY (EGD) with biopsies by forceps;  Surgeon: Puma Dominguez MD;  Location:  GI    ESOPHAGOSCOPY, GASTROSCOPY, DUODENOSCOPY (EGD), COMBINED N/A 6/12/2017    Procedure: COMBINED ESOPHAGOSCOPY, GASTROSCOPY, DUODENOSCOPY (EGD);  ESOPHAGOSCOPY, GASTROSCOPY, DUODENOSCOPY (EGD);  Surgeon: Sylvester Lucas MD;  Location:  GI    ESOPHAGOSCOPY, GASTROSCOPY, DUODENOSCOPY (EGD), COMBINED N/A 5/1/2019    Procedure: ESOPHAGOGASTRODUODENOSCOPY (EGD);  Surgeon: Artur Bruce MD;  Location:  GI    HERNIORRHAPHY INGUINAL Right 3/8/2017    Procedure: HERNIORRHAPHY INGUINAL;  Surgeon: Kong Lassiter MD;  Location: [facility]    JOINT REPLACEMENT, HIP RT/LT Right 02/03/2012    MOHS MICROGRAPHIC PROCEDURE Left  2017    Left cheek    REMOVE HARDWARE KNEE Left 2023    Procedure: REMOVAL, screws from left  KNEE;  Surgeon: Puma Clemente MD;  Location:  OR    [facility] TOTAL KNEE ARTHROPLASTY Bilateral        Social History     Tobacco Use    Smoking status: Former     Types: Pipe     Quit date:      Years since quittin.0    Smokeless tobacco: Never   Substance Use Topics    Alcohol use: Yes     Alcohol/week: 0.0 standard drinks of alcohol     Comment: occasional     Family History   Problem Relation Age of Onset    Stomach Cancer Father          at age 46         Current Outpatient Medications   Medication Sig Dispense Refill    acetaminophen (TYLENOL) 325 MG tablet Take 325-650 mg by mouth every 4 hours as needed for mild pain or fever Reported on 3/6/2017 100 tablet     fluticasone (FLONASE) 50 MCG/ACT nasal spray Spray 1 spray into both nostrils daily 16 g 3    loratadine (CLARITIN) 10 MG tablet Take 10 mg by mouth daily      finasteride (PROSCAR) 5 MG tablet Take 1 tablet (5 mg) by mouth daily 30 tablet 0    tamsulosin (FLOMAX) 0.4 MG capsule Take 1 capsule (0.4 mg) by mouth daily 90 capsule 3     Allergies   Allergen Reactions    Dust Mites     Hydrocodone-Acetaminophen Other (See Comments)     nightmares    No Clinical Screening - See Comments Other (See Comments)    Penicillins Unknown     As a child      Zithromax [Azithromycin Dihydrate]      diarrhea     Current providers sharing in care for this patient include:  Patient Care Team:  Adria Cowart DO as PCP - General (Internal Medicine)  Adria Cowart DO as Assigned PCP  Puma Clemente MD as Assigned Musculoskeletal Provider    The following health maintenance items are reviewed in Epic and correct as of today:  Health Maintenance   Topic Date Due    ZOSTER IMMUNIZATION (1 of 2) Never done    DTAP/TDAP/TD IMMUNIZATION (3 - Td or Tdap) 2024    MEDICARE ANNUAL WELLNESS VISIT  2024    ANNUAL  "REVIEW OF HM ORDERS  05/26/2024    INFLUENZA VACCINE (1) Never done    COVID-19 Vaccine (4 - 2024-25 season) 09/01/2024    LUNG CANCER SCREENING  01/18/2025    FALL RISK ASSESSMENT  01/27/2026    GLUCOSE  05/26/2026    RSV VACCINE (1 - 1-dose 75+ series) 06/23/2026    COLORECTAL CANCER SCREENING  10/26/2026    LIPID  05/26/2028    ADVANCE CARE PLANNING  12/04/2028    HEPATITIS C SCREENING  Completed    PHQ-2 (once per calendar year)  Completed    Pneumococcal Vaccine: 50+ Years  Completed    AORTIC ANEURYSM SCREENING (SYSTEM ASSIGNED)  Completed    HPV IMMUNIZATION  Aged Out    MENINGITIS IMMUNIZATION  Aged Out    RSV MONOCLONAL ANTIBODY  Aged Out         Review of Systems  CONSTITUTIONAL: NEGATIVE for fever, chills, change in weight  INTEGUMENTARY/SKIN: NEGATIVE for worrisome rashes, moles or lesions  EYES: NEGATIVE for vision changes or irritation  ENT/MOUTH: NEGATIVE for ear, mouth and throat problems  RESP: NEGATIVE for significant cough or SOB  BREAST: NEGATIVE for masses, tenderness or discharge  CV: NEGATIVE for chest pain, palpitations or peripheral edema  GI: NEGATIVE for nausea, abdominal pain, heartburn, or change in bowel habits   male :positive for, frequency, and nocturia x 4  MUSCULOSKELETAL: NEGATIVE for significant arthralgias or myalgia  NEURO: NEGATIVE for weakness, dizziness or paresthesias  ENDOCRINE: NEGATIVE for temperature intolerance, skin/hair changes  HEME: NEGATIVE for bleeding problems  PSYCHIATRIC: NEGATIVE for changes in mood or affect     Objective    Exam  BP (!) 153/92   Pulse 82   Temp 98.3  F (36.8  C) (Temporal)   Resp 15   Ht 1.854 m (6' 1\")   Wt 125.1 kg (275 lb 12.8 oz)   SpO2 98%   BMI 36.39 kg/m     Estimated body mass index is 36.39 kg/m  as calculated from the following:    Height as of this encounter: 1.854 m (6' 1\").    Weight as of this encounter: 125.1 kg (275 lb 12.8 oz).    Physical Exam  GENERAL: alert and no distress  EYES: Eyes grossly normal to " inspection, PERRL and conjunctivae and sclerae normal  HENT: ear canals and TM's normal, nose and mouth without ulcers or lesions  NECK: no adenopathy, no asymmetry, masses, or scars  RESP: lungs clear to auscultation - no rales, rhonchi or wheezes  CV: regular rate and rhythm, normal S1 S2, no S3 or S4, no murmur, click or rub, no peripheral edema  ABDOMEN: soft, nontender, no hepatosplenomegaly, no masses and bowel sounds normal  MS: no gross musculoskeletal defects noted, no edema  SKIN: no suspicious lesions or rashes  NEURO: Normal strength and tone, mentation intact and speech normal  PSYCH: mentation appears normal, affect normal/bright        1/27/2025   Mini Cog   Clock Draw Score 2 Normal   3 Item Recall 3 objects recalled   Mini Cog Total Score 5              I have reviewed the patient's vaccination schedule and discussed the benefits of prophylactic vaccination in detail.  I recommend the patient contact their pharmacist for vaccinations.  Discussed that most insurance companies now favor reimbursement to the pharmacies and it will financially behoove the patient to have vaccinations performed at their pharmacy.    Patient will consider COVID and influenza vaccinations in the future.    Signed Electronically by: Adria Cowart DO

## 2025-02-12 ENCOUNTER — OFFICE VISIT (OUTPATIENT)
Dept: UROLOGY | Facility: CLINIC | Age: 75
End: 2025-02-12
Attending: INTERNAL MEDICINE
Payer: COMMERCIAL

## 2025-02-12 VITALS
SYSTOLIC BLOOD PRESSURE: 138 MMHG | BODY MASS INDEX: 36.45 KG/M2 | HEART RATE: 97 BPM | DIASTOLIC BLOOD PRESSURE: 92 MMHG | TEMPERATURE: 97 F | WEIGHT: 275 LBS | HEIGHT: 73 IN

## 2025-02-12 DIAGNOSIS — N40.1 BENIGN PROSTATIC HYPERPLASIA WITH NOCTURIA: Primary | ICD-10-CM

## 2025-02-12 DIAGNOSIS — R35.1 BENIGN PROSTATIC HYPERPLASIA WITH NOCTURIA: Primary | ICD-10-CM

## 2025-02-12 DIAGNOSIS — R03.0 ELEVATED BP WITHOUT DIAGNOSIS OF HYPERTENSION: ICD-10-CM

## 2025-02-12 LAB
ALBUMIN UR-MCNC: NEGATIVE MG/DL
APPEARANCE UR: CLEAR
BILIRUB UR QL STRIP: NEGATIVE
COLOR UR AUTO: NORMAL
GLUCOSE UR STRIP-MCNC: NEGATIVE MG/DL
HGB UR QL STRIP: NEGATIVE
KETONES UR STRIP-MCNC: NEGATIVE MG/DL
LEUKOCYTE ESTERASE UR QL STRIP: NEGATIVE
NITRATE UR QL: NEGATIVE
PH UR STRIP: 5.5 [PH] (ref 5–7)
SP GR UR STRIP: 1.02 (ref 1–1.03)
UROBILINOGEN UR STRIP-MCNC: NORMAL MG/DL

## 2025-02-12 PROCEDURE — 51741 ELECTRO-UROFLOWMETRY FIRST: CPT | Performed by: UROLOGY

## 2025-02-12 PROCEDURE — 99204 OFFICE O/P NEW MOD 45 MIN: CPT | Mod: 25 | Performed by: UROLOGY

## 2025-02-12 PROCEDURE — 52000 CYSTOURETHROSCOPY: CPT | Performed by: UROLOGY

## 2025-02-12 PROCEDURE — 51798 US URINE CAPACITY MEASURE: CPT | Performed by: UROLOGY

## 2025-02-12 PROCEDURE — 81003 URINALYSIS AUTO W/O SCOPE: CPT | Performed by: UROLOGY

## 2025-02-12 ASSESSMENT — PAIN SCALES - GENERAL: PAINLEVEL_OUTOF10: NO PAIN (0)

## 2025-02-12 NOTE — PATIENT INSTRUCTIONS
Someone will reach out to you within 2 business days. If you have questions about the procedure, call the Urology Department @ [phone] or [phone]

## 2025-02-12 NOTE — PROGRESS NOTES
Bladder Scan performed. 147 ml maximum residual urine detected after 3 scans. MD informed   Josette Yuan MA on 2/12/2025 at 11:15 AM    Bladder Scan performed. 11 ml maximum residual urine detected after 3 scans. MD emmanuel Yuan MA on 2/12/2025 at 12:35 PM

## 2025-02-12 NOTE — PROGRESS NOTES
S: Jarrod Lewis is a pleasant  74 year old male who was requested to be seen by  Adria Cowatr for a consult with regard to patient's urinary complaints.  Patient complains of Hesitancy in starting urinary stream, Sensation of incomplete emptying, Strain to Urinate, Nocturia x 2-3, Frequency, Intermittency, and slow urinary stream.  He has no history of elevated PSA.  Symptoms have been on going for   several years(s).  Seems to be worsened over time.  His recent PSA was found to be   PSA   Date Value Ref Range Status   02/05/2021 1.79 0 - 4 ug/L Final     Comment:     Assay Method:  Chemiluminescence using Siemens Vista analyzer     Prostate Specific Antigen Screen   Date Value Ref Range Status   01/27/2025 1.34 0.00 - 6.50 ng/mL Final   02/08/2022 1.82 0.00 - 4.00 ug/L Final   .  His AUA Symptom Score:  22.  His QOL score:  3-4.  He is on both flomax/proscar.    Current Outpatient Medications   Medication Sig Dispense Refill    acetaminophen (TYLENOL) 325 MG tablet Take 325-650 mg by mouth every 4 hours as needed for mild pain or fever Reported on 3/6/2017 100 tablet     finasteride (PROSCAR) 5 MG tablet Take 1 tablet (5 mg) by mouth daily 30 tablet 0    fluticasone (FLONASE) 50 MCG/ACT nasal spray Spray 1 spray into both nostrils daily 16 g 3    loratadine (CLARITIN) 10 MG tablet Take 10 mg by mouth daily      tamsulosin (FLOMAX) 0.4 MG capsule Take 1 capsule (0.4 mg) by mouth daily 90 capsule 3     Allergies   Allergen Reactions    Dust Mites     Hydrocodone-Acetaminophen Other (See Comments)     nightmares    No Clinical Screening - See Comments Other (See Comments)    Penicillins Unknown     As a child      Zithromax [Azithromycin Dihydrate]      diarrhea     Past Medical History:   Diagnosis Date    Arthritis     Duodenal ulcer     Gastric ulcer     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 01/02/2017    Left cheek     Past Surgical History:   Procedure Laterality  Date    COLONOSCOPY  2012    Procedure: COLONOSCOPY;  Colonoscopy;  Surgeon: Sylvester Lucas MD;  Location:  GI    COLONOSCOPY N/A 10/26/2016    Procedure: COMBINED COLONOSCOPY, SINGLE OR MULTIPLE BIOPSY/POLYPECTOMY BY BIOPSY;  Surgeon: Trev Oquendo MD;  Location:  GI    ESOPHAGOSCOPY, GASTROSCOPY, DUODENOSCOPY (EGD), COMBINED N/A 2017    Procedure: COMBINED ESOPHAGOSCOPY, GASTROSCOPY, DUODENOSCOPY (EGD), BIOPSY SINGLE OR MULTIPLE;  ESOPHAGOSCOPY, GASTROSCOPY, DUODENOSCOPY (EGD) with biopsies by forceps;  Surgeon: Puma Dominguez MD;  Location:  GI    ESOPHAGOSCOPY, GASTROSCOPY, DUODENOSCOPY (EGD), COMBINED N/A 2017    Procedure: COMBINED ESOPHAGOSCOPY, GASTROSCOPY, DUODENOSCOPY (EGD);  ESOPHAGOSCOPY, GASTROSCOPY, DUODENOSCOPY (EGD);  Surgeon: Sylvester Lucas MD;  Location:  GI    ESOPHAGOSCOPY, GASTROSCOPY, DUODENOSCOPY (EGD), COMBINED N/A 2019    Procedure: ESOPHAGOGASTRODUODENOSCOPY (EGD);  Surgeon: Artur Bruce MD;  Location:  GI    HERNIORRHAPHY INGUINAL Right 3/8/2017    Procedure: HERNIORRHAPHY INGUINAL;  Surgeon: Kong Lassiter MD;  Location: [facility]    JOINT REPLACEMENT, HIP RT/LT Right 2012    MOHS MICROGRAPHIC PROCEDURE Left 2017    Left cheek    REMOVE HARDWARE KNEE Left 2023    Procedure: REMOVAL, screws from left  KNEE;  Surgeon: Puma Clemente MD;  Location:  OR    [facility] TOTAL KNEE ARTHROPLASTY Bilateral       Family History   Problem Relation Age of Onset    Stomach Cancer Father          at age 46     He does not have a family history of prostate cancer.  Social History     Socioeconomic History    Marital status: Single     Spouse name: None    Number of children: None    Years of education: None    Highest education level: None   Occupational History    Occupation:    Tobacco Use    Smoking status: Former     Types: Pipe     Quit date:      Years since quittin.1    Smokeless tobacco: Never   Vaping  Use    Vaping status: Never Used   Substance and Sexual Activity    Alcohol use: Yes     Alcohol/week: 0.0 standard drinks of alcohol     Comment: occasional    Drug use: No    Sexual activity: Yes     Partners: Female     Social Drivers of Health     Financial Resource Strain: Low Risk  (1/27/2025)    Financial Resource Strain     Within the past 12 months, have you or your family members you live with been unable to get utilities (heat, electricity) when it was really needed?: No   Food Insecurity: Low Risk  (1/27/2025)    Food Insecurity     Within the past 12 months, did you worry that your food would run out before you got money to buy more?: No     Within the past 12 months, did the food you bought just not last and you didn t have money to get more?: No   Transportation Needs: Low Risk  (1/27/2025)    Transportation Needs     Within the past 12 months, has lack of transportation kept you from medical appointments, getting your medicines, non-medical meetings or appointments, work, or from getting things that you need?: No   Physical Activity: Unknown (1/27/2025)    Exercise Vital Sign     Days of Exercise per Week: 4 days   Stress: No Stress Concern Present (1/27/2025)    [employer] - Occupational Stress Questionnaire     Feeling of Stress : Only a little   Social Connections: Unknown (1/27/2025)    Social Connection and Isolation Panel [NHANES]     Frequency of Social Gatherings with Friends and Family: Once a week   Interpersonal Safety: Low Risk  (1/27/2025)    Interpersonal Safety     Do you feel physically and emotionally safe where you currently live?: Yes     Within the past 12 months, have you been hit, slapped, kicked or otherwise physically hurt by someone?: No     Within the past 12 months, have you been humiliated or emotionally abused in other ways by your partner or ex-partner?: No   Housing Stability: Low Risk  (1/27/2025)    Housing Stability     Do you have  "housing? : Yes     Are you worried about losing your housing?: No        REVIEW OF SYSTEMS  =================  C: NEGATIVE for fever, chills, change in weight  I: NEGATIVE for worrisome rashes, moles or lesions  E/M: NEGATIVE for ear, mouth and throat problems  R: NEGATIVE for significant cough or SHORTNESS OF BREATH  CV:  NEGATIVE for chest pain, palpitations or peripheral edema  GI: NEGATIVE for nausea, abdominal pain, heartburn, or change in bowel habits  NEURO: NEGATIVE numbness/weakness  : see HPI  PSYCH: NEGATIVE depression/anxiety  LYmph: no new enlarged lymph nodes  Ortho: no new trauma/movements           O: Exam:BP (!) 138/92 (BP Location: Right arm, Patient Position: Sitting, Cuff Size: Adult Large)   Pulse 97   Temp 97  F (36.1  C) (Temporal)   Ht 1.854 m (6' 1\")   Wt 124.7 kg (275 lb)   BMI 36.28 kg/m     Constitutional: healthy, alert and no distress  Cardiovascular: negative, PMI normal.   Respiratory: negative, no evidence of respiratory distress  Gastrointestinal: Abdomen soft, non-tender. BS normal. No masses, organomegaly  : penis no discharge.  Testis no masses.  No scrotal skin lesion.  Prostate apex only.   ml  Musculoskeletal: extremities normal- no gross deformities noted, gait normal and normal muscle tone  Skin: no suspicious lesions or rashes  Neurologic: Alert and oriented  Psychiatric: mentation appears normal. and affect normal/bright  Hematologic/Lymphatic/Immunologic: normal ant/post cervical, axillary, supraclavicular and inguinal nodes    Cysto done today    Patient is draped and prepped.  Flexible cystoscopy placed under direct vision.      The anterior urethra is normal   The prostatic urethra showed bilateral lobe enlargement.     The length is 2cm,  the coaptation is 2 cm.     In the bladder there is trabeculation grade 1-2.    Uroflow/ru post cysto:  voided vol 291 ml with max flow of 29 ml/sec and average flow of 7.3 ml/sec and PVR < 50 ml.  Patient reported " flow really good after cysto..    Assessment/Plan:  (N40.1,  R35.1) Benign prostatic hyperplasia with nocturia  (primary encounter diagnosis)  Comment:    Plan:               (R03.0) Elevated BP without diagnosis of hypertension  Comment:    Plan: Al to follow up with Primary Care provider regarding elevated blood pressure.     Recommendations:   Medical management versus surgical management versus office treatments, were discussed with patient at length. Pros and Cons discussed.  Schedule for elective Rezume.  Info provided.

## 2025-02-18 ENCOUNTER — TELEPHONE (OUTPATIENT)
Dept: UROLOGY | Facility: CLINIC | Age: 75
End: 2025-02-18
Payer: COMMERCIAL

## 2025-02-18 DIAGNOSIS — N40.1 BENIGN PROSTATIC HYPERPLASIA WITH NOCTURIA: Primary | ICD-10-CM

## 2025-02-18 DIAGNOSIS — R35.1 BENIGN PROSTATIC HYPERPLASIA WITH NOCTURIA: Primary | ICD-10-CM

## 2025-02-18 RX ORDER — LEVOFLOXACIN 500 MG/1
500 TABLET, FILM COATED ORAL DAILY
Qty: 9 TABLET | Refills: 0 | Status: SHIPPED | OUTPATIENT
Start: 2025-02-18

## 2025-02-18 RX ORDER — IBUPROFEN 800 MG/1
800 TABLET, FILM COATED ORAL 3 TIMES DAILY
Qty: 42 TABLET | Refills: 0 | Status: SHIPPED | OUTPATIENT
Start: 2025-02-18 | End: 2025-02-18 | Stop reason: ALTCHOICE

## 2025-02-18 RX ORDER — PHENAZOPYRIDINE HYDROCHLORIDE 100 MG/1
200 TABLET, FILM COATED ORAL 3 TIMES DAILY PRN
Qty: 60 TABLET | Refills: 0 | Status: SHIPPED | OUTPATIENT
Start: 2025-02-18

## 2025-02-18 RX ORDER — METHYLPREDNISOLONE 4 MG/1
TABLET ORAL
Qty: 21 TABLET | Refills: 0 | Status: SHIPPED | OUTPATIENT
Start: 2025-02-18

## 2025-02-18 NOTE — CONFIDENTIAL NOTE
Writer called and spoke with patient about Rezum procedure scheduling.  Last cystoscopy: 2/12/25  Pre Rezum AUA= 22  Patient currently on blood thinners? No  C[place] Scott    Education documentation:  To improve patient experience and health outcomes, patient received general discussion/verbal explanation on Rezum. Patient acknowledged understanding on the education.     Appointment scheduling:  The following appointments were scheduled:   Rezum procedure appointment with Dr. Renner and ultrasound.  1 week follow up for TOV with RN.  3 month follow up appointment with Dr. Renner    Prescribing medication (sent to patient's preferred pharmacy):  Antibiotics  AZO  Ibuprofen   4. Pt was also informed to purchase a Fleets  enema OTC.   Signed Prescriptions:                        Disp   Refills    levofloxacin (LEVAQUIN) 500 MG tablet      9 tabl*0        Sig: Take 1 tablet (500 mg) by mouth daily. To start the           day before the REZUM procedure and take daily           until the catheter is removed.  Authorizing Provider: JASMINA RENNER  Ordering User: ENMANUEL PERERA    phenazopyridine (PYRIDIUM) 100 MG tablet   60 tab*0        Sig: Take 2 tablets (200 mg) by mouth 3 times daily as           needed (AS NEEDED FOR IRRITIVE VOIDING SYMPTOMS           AFTER THE CATHETER IS REMOVED).  Authorizing Provider: JASMNIA RENNER  Ordering User: ENMANUEL PERERA    methylPREDNISolone (MEDROL DOSEPAK) 4 MG t*21 tab*0        Sig: Follow Package Directions, starting on 3.5.25.  Authorizing Provider: JASMINA RENNER  Ordering User: ENMANUEL PERERA    Pt Is not able to take Ibuprofen as he gets ulcers. Medrol dose pack sent in place of this.    Information was sent to patient via [employer].    Renate VAUGHN RN Urology 2/18/2025 10:09 AM

## 2025-02-18 NOTE — TELEPHONE ENCOUNTER
[facility]    Phone Message    May a detailed message be left on voicemail: yes     Reason for Call: Other: Patient says he's been waiting for a call to schedule his procedure. Please call pt. Thank you!     Action Taken: Message routed to:  [facility]    Travel Screening: Not Applicable     Date of Service:

## 2025-02-27 ENCOUNTER — OFFICE VISIT (OUTPATIENT)
Dept: UROLOGY | Facility: CLINIC | Age: 75
End: 2025-02-27
Payer: COMMERCIAL

## 2025-02-27 ENCOUNTER — TELEPHONE (OUTPATIENT)
Dept: UROLOGY | Facility: CLINIC | Age: 75
End: 2025-02-27

## 2025-02-27 VITALS
OXYGEN SATURATION: 98 % | SYSTOLIC BLOOD PRESSURE: 143 MMHG | TEMPERATURE: 97.7 F | DIASTOLIC BLOOD PRESSURE: 88 MMHG | HEART RATE: 94 BPM

## 2025-02-27 DIAGNOSIS — N40.1 BENIGN PROSTATIC HYPERPLASIA WITH NOCTURIA: Primary | ICD-10-CM

## 2025-02-27 DIAGNOSIS — R35.1 BENIGN PROSTATIC HYPERPLASIA WITH NOCTURIA: Primary | ICD-10-CM

## 2025-02-27 DIAGNOSIS — R03.0 ELEVATED BP WITHOUT DIAGNOSIS OF HYPERTENSION: ICD-10-CM

## 2025-02-27 NOTE — TELEPHONE ENCOUNTER
Writer called and spoke with pt. He was questioning when to start the Medrol dose lluvia. Instructions state 3.5.25, Writer told him to start today.     Verified with Dr. Renner and best practice is: to start the day of the procedure. Patient was given this information.    Renate VAUGHN RN   [facility], Urology   2/27/2025 2:18 PM

## 2025-02-27 NOTE — PROGRESS NOTES
[facility]  Prostate measurement performed and neurovascular bundles anesthetized with 1% lidocaine.  Prostate size  42 gm.    Treatment:    Cystoscope placed into bladder under direct vision.     Lateral lobes treated x 1 on each side.  Median lobe treated x1      Bladder irrigated to remove blood clots.    18F  Coude tip Lanier catheter placed.    RTC in 7-10 days for catheter removal.    Elevated bp: Al to follow up with Primary Care provider regarding elevated blood pressure.

## 2025-02-27 NOTE — PROGRESS NOTES
Riley?mTM Water Vapor Therapy OP Notes    PROCEDURE: Prostate Ultrasound, Cystoscopy and Riley?m Convective Radiofrequency Water Vapor Thermal prostate therapy.    Informed consent was signed.   Pre- procedure antibiotic taken?  Yes  Aspirin or other blood thinning medications discontinued 7-10 days:  Yes  Time of enema:  AM  AUA was completed pre procedure. AUA symptom score= 29, QOL= 5    The patient was prepped with betadine. A lidocaine urojet was instilled into the patient's urethra. Pt tolerated the procedure well. VSS.    The patient is scheduled for a cath removal appointment in 7 days with RN.  The patient is scheduled for an appointment in 3 months with provider.    DLS Rezum kit:  GTIN: [id number]  REF: [id number]  LOT: [id number]    Education documentation:    To improve patient experience and health outcomes, patient was educated on REZUM, indwelling catheter care at home, leg bag and bedside drainage bag.     Teaching method:  Patient received written materials handout, general discussion/verbal explanation, and demonstration    Patient acknowledged understanding on the education and was given the opportunity to ask questions and obtain answers.      Renate VAUGHN RN Urology 2/27/2025 9:46 AM

## 2025-02-27 NOTE — TELEPHONE ENCOUNTER
[facility] Call Center    Phone Message    May a detailed message be left on voicemail: no     Reason for Call: Other: Patient called and had a question about a medication he was supposed to take. Please call patient back to discuss.     Action Taken: Other: [facility] Urology    Travel Screening: Not Applicable     Date of Service:

## 2025-02-27 NOTE — PATIENT INSTRUCTIONS
Riley?mTM Water Vapor Therapy Patient  Instructions Post Treatment    What are some of the short term side effects that may occur with Rezum?  As a minimally invasive procedure, Rezum has demonstrated fewer side effects compared to those typically seen with surgical therapies, but as with any interventional procedure, some of the following side effects may temporarily occur while your catheter is in place:    .  Blood in urine    Patients have found that the following options may help relieve discomfort during the short term healing process:  .  Take a mild pain medication such as Acetaminophen (Tylenol) or Ibuprofen (as directed) (ie: Ibuprofen 800 mg three times a day for 14 days, Acetaminophen 1000mg every 8 hours)  .  Eliminate bladder irritants such as caffeine, chocolate, and alcohol from your diet    SYMPTOMS THAT MAY BE SERIOUS. YOU SHOULD CONTACT YOUR DOCTOR IMMEDIATELY IF ANY OF THE FOLLOWING OCCUR:  Fever or chills.  If your oral temperature is greater than or equal to 101.0 degrees F, this may indicate that you have an infection that needs to be evaluated quickly.  Call your doctor.    Inability to pass urine via catheter.  This may indicate that swelling or a blood clot is blocking the urinary passage.  If not treated this can become more and more painful.  Check the catheter tubing for kincks, change your position, readjust the catheter gently, disconnect the catheter from the urinary drainage bag and reattach. If these trouble shooting items do not help, call your urologist during business hours. If it is after hours, proceed to the nearest Emergency Room.    Severe pain.  While there may be some pain related to the procedure, it is usually not severe.  If you are experiencing severe pain or any condition you think may be serious, call your doctor.    The number to call if you are having a problem is [phone].     CALL 911 IN CASE OF EMERGENCY.      General Recommendations, FAQ  General  Recommendations  1. Drink extra fluids (water preferred) for the first few days following the procedure.    2. Take medications as prescribed by your doctor. If Ibuprofen was prescribed for you, please take this medication as prescribed regardless if pain is present or not. The anti-inflammatory benefit minimizes inflammation around the procedure site. If a Medrol steroid lluvia was prescribed for you, please take this to help decrease swelling. (You may also take Ibuprofen if pain is present).    3. Limit your activities for at least the first day.    4. For catheter cares, please follow the instructions provided by your doctor.    5. It is very important that you inform your doctor if you have any post-procedure problems or receive treatment of any kind outside of this clinic in the days following your procedure.    Frequently Asked Questions    When can I resume normal physical activities?  There are no specific limitations if you work out in a gym, but you should probably limit vigorous workouts such as bike riding, running, treadmills, and heavy weight lifting for the first week. If you experience any blood in your urine, limit your activities and increase your water intake.    When can I resume my medications including blood thinners?  In general, you should continue with any of your regular medicines; however, check with your doctor particularly if you are a diabetic. Your doctor will determine when to start blood thinners again, it is often on the day of the procedure. You should continue to use your BPH medications until you are advised to stop.    When can I go back to work?  In general, you can return to work the following day.    When can I expect to see results?   In general, improved voiding results may take 4-12 weeks before being noticed depending on prostate size and number of treatments.

## 2025-03-06 ENCOUNTER — ALLIED HEALTH/NURSE VISIT (OUTPATIENT)
Dept: UROLOGY | Facility: CLINIC | Age: 75
End: 2025-03-06
Payer: COMMERCIAL

## 2025-03-06 DIAGNOSIS — Z46.6 ENCOUNTER FOR FOLEY CATHETER REMOVAL: Primary | ICD-10-CM

## 2025-03-06 NOTE — PROGRESS NOTES
Patient arrives for catheter removal. 240mL water instilled into the bladder through existing geiger catheter. Catheter removed without problem. Patient urinated 200cc water into urinal. Patient will call or return to clinic or urgent care if unable to void.    Phuong Holden RN   [facility]

## 2025-06-04 ENCOUNTER — OFFICE VISIT (OUTPATIENT)
Dept: UROLOGY | Facility: CLINIC | Age: 75
End: 2025-06-04
Payer: COMMERCIAL

## 2025-06-04 VITALS
BODY MASS INDEX: 36.45 KG/M2 | DIASTOLIC BLOOD PRESSURE: 86 MMHG | TEMPERATURE: 96.8 F | SYSTOLIC BLOOD PRESSURE: 138 MMHG | HEIGHT: 73 IN | WEIGHT: 275 LBS

## 2025-06-04 DIAGNOSIS — N40.1 BENIGN PROSTATIC HYPERPLASIA WITH NOCTURIA: Primary | ICD-10-CM

## 2025-06-04 DIAGNOSIS — R35.1 BENIGN PROSTATIC HYPERPLASIA WITH NOCTURIA: Primary | ICD-10-CM

## 2025-06-04 PROCEDURE — 51798 US URINE CAPACITY MEASURE: CPT | Performed by: UROLOGY

## 2025-06-04 PROCEDURE — 1126F AMNT PAIN NOTED NONE PRSNT: CPT | Performed by: UROLOGY

## 2025-06-04 PROCEDURE — 99213 OFFICE O/P EST LOW 20 MIN: CPT | Mod: 25 | Performed by: UROLOGY

## 2025-06-04 PROCEDURE — 3075F SYST BP GE 130 - 139MM HG: CPT | Performed by: UROLOGY

## 2025-06-04 PROCEDURE — 3079F DIAST BP 80-89 MM HG: CPT | Performed by: UROLOGY

## 2025-06-04 RX ORDER — FINASTERIDE 5 MG/1
5 TABLET, FILM COATED ORAL DAILY
Qty: 90 TABLET | Refills: 3 | Status: SHIPPED | OUTPATIENT
Start: 2025-06-04

## 2025-06-04 ASSESSMENT — PAIN SCALES - GENERAL: PAINLEVEL_OUTOF10: NO PAIN (0)

## 2025-06-04 NOTE — PROGRESS NOTES
Chief Complaint   Patient presents with    post op rezum       Jarrod Lewis is a 74 year old male who presents today for follow up of   Chief Complaint   Patient presents with    post op rezum   Follow-up post Rezum.  He has improved since the procedure.  His AUA symptom score today is 12 compared to 30 prior.  He still has some double voiding especially in the morning and nocturia x 3 however.  He has no dysuria or hematuria.  His flow has improved.    Current Outpatient Medications   Medication Sig Dispense Refill    acetaminophen (TYLENOL) 325 MG tablet Take 325-650 mg by mouth every 4 hours as needed for mild pain or fever Reported on 3/6/2017 100 tablet     finasteride (PROSCAR) 5 MG tablet Take 1 tablet (5 mg) by mouth daily. 90 tablet 3    fluticasone (FLONASE) 50 MCG/ACT nasal spray Spray 1 spray into both nostrils daily 16 g 3    levofloxacin (LEVAQUIN) 500 MG tablet Take 1 tablet (500 mg) by mouth daily. To start the day before the REZUM procedure and take daily until the catheter is removed. 9 tablet 0    loratadine (CLARITIN) 10 MG tablet Take 10 mg by mouth daily      methylPREDNISolone (MEDROL DOSEPAK) 4 MG tablet therapy pack Follow Package Directions, starting on 3.5.25. 21 tablet 0    phenazopyridine (PYRIDIUM) 100 MG tablet Take 2 tablets (200 mg) by mouth 3 times daily as needed (AS NEEDED FOR IRRITIVE VOIDING SYMPTOMS AFTER THE CATHETER IS REMOVED). 60 tablet 0     Allergies   Allergen Reactions    Dust Mites     Hydrocodone-Acetaminophen Other (See Comments)     nightmares    No Clinical Screening - See Comments Other (See Comments)    Penicillins Unknown     As a child      Zithromax [Azithromycin Dihydrate]      diarrhea      Past Medical History:   Diagnosis Date    Arthritis     Duodenal ulcer     Gastric ulcer     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 01/02/2017    Left cheek     Past Surgical History:   Procedure Laterality Date    COLONOSCOPY  12/14/2012     Procedure: COLONOSCOPY;  Colonoscopy;  Surgeon: Sylvester Lucas MD;  Location:  GI    COLONOSCOPY N/A 10/26/2016    Procedure: COMBINED COLONOSCOPY, SINGLE OR MULTIPLE BIOPSY/POLYPECTOMY BY BIOPSY;  Surgeon: Trev Oquendo MD;  Location:  GI    ESOPHAGOSCOPY, GASTROSCOPY, DUODENOSCOPY (EGD), COMBINED N/A 2017    Procedure: COMBINED ESOPHAGOSCOPY, GASTROSCOPY, DUODENOSCOPY (EGD), BIOPSY SINGLE OR MULTIPLE;  ESOPHAGOSCOPY, GASTROSCOPY, DUODENOSCOPY (EGD) with biopsies by forceps;  Surgeon: Puma Dominguez MD;  Location:  GI    ESOPHAGOSCOPY, GASTROSCOPY, DUODENOSCOPY (EGD), COMBINED N/A 2017    Procedure: COMBINED ESOPHAGOSCOPY, GASTROSCOPY, DUODENOSCOPY (EGD);  ESOPHAGOSCOPY, GASTROSCOPY, DUODENOSCOPY (EGD);  Surgeon: Sylvester Lucas MD;  Location:  GI    ESOPHAGOSCOPY, GASTROSCOPY, DUODENOSCOPY (EGD), COMBINED N/A 2019    Procedure: ESOPHAGOGASTRODUODENOSCOPY (EGD);  Surgeon: Artur Bruce MD;  Location:  GI    HERNIORRHAPHY INGUINAL Right 3/8/2017    Procedure: HERNIORRHAPHY INGUINAL;  Surgeon: Kong Lassiter MD;  Location: [facility]    JOINT REPLACEMENT, HIP RT/LT Right 2012    MOHS MICROGRAPHIC PROCEDURE Left 2017    Left cheek    REMOVE HARDWARE KNEE Left 2023    Procedure: REMOVAL, screws from left  KNEE;  Surgeon: Puma Clemente MD;  Location:  OR    ZC TOTAL KNEE ARTHROPLASTY Bilateral      Family History   Problem Relation Age of Onset    Stomach Cancer Father          at age 46     Social History     Socioeconomic History    Marital status: Single     Spouse name: None    Number of children: None    Years of education: None    Highest education level: None   Occupational History    Occupation:    Tobacco Use    Smoking status: Former     Types: Pipe     Quit date:      Years since quittin.4    Smokeless tobacco: Never   Vaping Use    Vaping status: Never Used   Substance and Sexual Activity    Alcohol use: Yes      Alcohol/week: 0.0 standard drinks of alcohol     Comment: occasional    Drug use: No    Sexual activity: Yes     Partners: Female     Social Drivers of Health     Financial Resource Strain: Low Risk  (1/27/2025)    Financial Resource Strain     Within the past 12 months, have you or your family members you live with been unable to get utilities (heat, electricity) when it was really needed?: No   Food Insecurity: Low Risk  (1/27/2025)    Food Insecurity     Within the past 12 months, did you worry that your food would run out before you got money to buy more?: No     Within the past 12 months, did the food you bought just not last and you didn t have money to get more?: No   Transportation Needs: Low Risk  (1/27/2025)    Transportation Needs     Within the past 12 months, has lack of transportation kept you from medical appointments, getting your medicines, non-medical meetings or appointments, work, or from getting things that you need?: No   Physical Activity: Unknown (1/27/2025)    Exercise Vital Sign     Days of Exercise per Week: 4 days   Stress: No Stress Concern Present (1/27/2025)    [employer] - Occupational Stress Questionnaire     Feeling of Stress : Only a little   Social Connections: Unknown (1/27/2025)    Social Connection and Isolation Panel [NHANES]     Frequency of Social Gatherings with Friends and Family: Once a week   Interpersonal Safety: Low Risk  (1/27/2025)    Interpersonal Safety     Do you feel physically and emotionally safe where you currently live?: Yes     Within the past 12 months, have you been hit, slapped, kicked or otherwise physically hurt by someone?: No     Within the past 12 months, have you been humiliated or emotionally abused in other ways by your partner or ex-partner?: No   Housing Stability: Low Risk  (1/27/2025)    Housing Stability     Do you have housing? : Yes     Are you worried about losing your housing?: No       REVIEW OF  "SYSTEMS  =================  C: NEGATIVE for fever, chills, change in weight  I: NEGATIVE for worrisome rashes, moles or lesions  E/M: NEGATIVE for ear, mouth and throat problems  R: NEGATIVE for significant cough or SHORTNESS OF BREATH  CV:  NEGATIVE for chest pain, palpitations or peripheral edema  GI: NEGATIVE for nausea, abdominal pain, heartburn, or change in bowel habits  NEURO: NEGATIVE numbness/weakness  : see HPI  PSYCH: NEGATIVE depression/anxiety  LYmph: no new enlarged lymph nodes  Ortho: no new trauma/movements    Physical Exam:  Blood pressure 138/86, temperature 96.8  F (36  C), temperature source Temporal, height 1.854 m (6' 0.99\"), weight 124.7 kg (275 lb).    GENERAL: healthy, alert and no distress  EYES: Eyes grossly normal to inspection, conjunctivae and sclerae normal  RESP: no audible wheeze, cough, or visible cyanosis.  No visible retractions or increased work of breathing.  Able to speak fully in complete sentences.  NEURO: Cranial nerves grossly intact, mentation intact and speech normal  PSYCH: mentation appears normal, affect normal/bright, judgement and insight intact, normal speech and appearance well-groomed    Assessment/Plan:   (N40.1,  R35.1) Benign prostatic hyperplasia with nocturia  (primary encounter diagnosis)  Comment:  ml  Plan: finasteride (PROSCAR) 5 MG tablet        Doing better post Rezum.  Some incomplete bladder emptying.  Restart finasteride.  See me in 6 months if symptoms not improving.            Please note: Voice recognition software was used in this dictation.  It may therefore contain typographical errors.           "

## 2025-06-24 ENCOUNTER — ANCILLARY PROCEDURE (OUTPATIENT)
Dept: GENERAL RADIOLOGY | Facility: CLINIC | Age: 75
End: 2025-06-24
Attending: PODIATRIST
Payer: COMMERCIAL

## 2025-06-24 ENCOUNTER — OFFICE VISIT (OUTPATIENT)
Dept: PODIATRY | Facility: CLINIC | Age: 75
End: 2025-06-24
Payer: COMMERCIAL

## 2025-06-24 VITALS — WEIGHT: 269 LBS | BODY MASS INDEX: 35.65 KG/M2 | HEIGHT: 73 IN

## 2025-06-24 DIAGNOSIS — L60.3 ONYCHODYSTROPHY: Primary | ICD-10-CM

## 2025-06-24 DIAGNOSIS — S99.921A RIGHT FOOT INJURY: ICD-10-CM

## 2025-06-24 DIAGNOSIS — L03.031 PARONYCHIA OF GREAT TOE OF RIGHT FOOT: ICD-10-CM

## 2025-06-24 PROCEDURE — 73630 X-RAY EXAM OF FOOT: CPT | Mod: TC | Performed by: RADIOLOGY

## 2025-06-24 PROCEDURE — 1126F AMNT PAIN NOTED NONE PRSNT: CPT | Performed by: PODIATRIST

## 2025-06-24 PROCEDURE — 99203 OFFICE O/P NEW LOW 30 MIN: CPT | Performed by: PODIATRIST

## 2025-06-24 ASSESSMENT — PAIN SCALES - GENERAL: PAINLEVEL_OUTOF10: NO PAIN (0)

## 2025-06-24 NOTE — PATIENT INSTRUCTIONS
INGROWN TOENAIL POSTOPERATIVE INSTRUCTIONS   (Nail avulsion or chemical matrixectomy)   1.  Go directly home and elevate the affected foot on one or two pillows for the remainder of the day & evening. Your toe may stay numb for 2-8 hours.   2.  Take Tylenol, ibuprofen or another anti-inflammatory as needed for pain. Most people require no pain medication.  3.  Use oral antibiotic if that was prescribed at your doctor visit. Take the entire prescription even if your symptoms have improved.   4.  Keep dressing dry and intact the day of the procedure. The morning after the procedure, remove entire dressing and soak or wash the affected area in lukewarm water for 5-10 minutes.  You may add Epsom salt to soothe the area and help it become drier. Do this twice a day or more until the surgical site remains dry without drainage.  This may take 1-2 weeks if a small part of your nail was removed or 4-8 weeks if the entire nail was removed. You may count showering or bathing as one soak.  After each soak, pat the area dry with a clean towel or gauze and then allow to air dry for a few minutes. Then cover with a cloth or fabric bandaid.  Avoid ointments as they keep the area to wet. Encourage this wound to become dry with a scab.   5.  You may walk and pursue everyday activities as tolerated with either an open toe shoe or cut-out shoe as needed. You may wear regular roomy shoes if no pain is noted.  No swimming in the lake, river, pool or hot tub until the wound has become dry.   7.  Watch for any signs or symptoms of infection such as: red streaks going up the foot/leg, swelling, pus or foul odor. For patients that have had a permanent phenol procedure, the toe will drain longer and may look red or sore for a half an inch around the wound similar to infection because it is a chemical burn.  Please call with questions.  8.  You may call my office in 1-2 weeks if the surgical site is not becoming drier or if other complications  "occur.   9.  There is 5-10% chance of complications such as infection or formation of another nail or a thick scared nail.         Nail Debridement    A high quality instrument makes trimming toenails MUCH easier.  Search [employer] for any 5\" nail nipper manufactured by reliable brands such as Miltex, Integra or Jarit as these quality instruments will help manage difficult nails more effectively and comfortably. We use Miltex -SS.  A physician is not necessary to trim nails even if you are taking blood thinners or are diabetic.  Your family or care givers may help manage your toenails.      Trim or sand the nails once weekly.  Do not wait until they are long and painful or trimming will become too difficult and painful and will increase your risk of complications or infection.  A course file or 120 grit sandpaper on a sanding block can be helpful.  For very thick nails many people prefer battery operated werner such as an Amope', Personal Pedi and Emjoi for regular use or heavy painful callouses or thick toenails.    Trim or skive any portion of nail that is thick, loose, crumbling, or not well attached. Do not tear the nail away, but rather cut them with a nail nipper or sand or sand them down.  You may follow up with your Podiatric Physician if you have pain, bleeding, infection, questions or other concerns.      You may also contact the following Registered Nurses for further help with nail debridement and minor hygiene concnerns.  They may come to your home or meet them at their clinic to trim your toenails and soak your feet, as well as monitor for any complications that would require evaluation by a Physician.      [facility]  Marisela Liriano RN  Phone & text [phone]    [facility]  Betsy Marin RN  Office [phone]    For up to date list and to find foot care nurses in other communities visit [employer] website:  afcna.org.     Scott, " Corns, IPKs, Porokeratosis    When there is excessive friction or pressure on the skin, the body responds by making the skin thicker.  While this may protect the deeper structures, the thickened skin can take up more space and thus increase pressure over a bony prominence or become an open sore or skin ulcer as this skin becomes less flexible.    Flat, diffuse thickening are simple calluses and they are usually caused by friction.  Often these are the result of rubbing on a shoe or going barefoot.    Calluses with a central core between the toes are called corns.  These often result from prominent joints on adjacent toes rubbing together.  Theses are often a symptom of bone malalignment and will usually recur unless the underlying bones are addressed.    Many of these lesions can be kept comfortable with routine maintenance. This consists of filing them with a Ped Egg, callus file, or 120 grit sandpaper on a block, every day during your bath or shower.  Most people prefer battery operated werner such as an Amope', Personal Pedi and Emjoi for regular use or heavy painful callouses.  Heavy creams or ointments can be applied 1-2 times every day to keep them soft. Toe spacers can be used for corns, gel pads can be used for other lesions on the bottom of the foot. If there is a deformity noted, such as a prominent bone, often this can be addressed to minimize recurrence. However, sometimes the pressure and lesion simply migrates to another spot after surgery, so it is not a guaranteed cure.     If you have heavy calluses, you may apply heavier cream that you scoop up such as Cetaphil cream, or Eucerin cream.  Be sure to obtain cream and not lotion.  Lotion is water based, dispenses through a pump, and not durable enough for feet). If your skin is cracked you may even select ointment such as Aquaphor or Vaseline. For more aggressive short term help apply heavy creams or ointment under occlusive dressings such as Saran  Wrap or Jelly Feet while sleeping.   Jelly Feet can be obtained at www.[employer].com.     To be successful with managing hyperkeratotic skin, you must manage hygiene daily.  Performing this hygiene once a month or sometimes even once a week may not be enough to stay ahead of it.  At your bath or shower time is the easiest time to work on this.  There is no medical or surgical treatment that will absolutely eliminate many of these and symptoms.    Pedifix is a reliable source for all sorts of foot pads, cushions, or interdigital spacers and foot appliances. Go to www.LOC[employer]ALL.Targazyme or request a catalog at 9-681-Asurint.

## 2025-06-24 NOTE — LETTER
6/24/2025      Jarrod Lewis  [address]  [place] [address]      Dear Colleague,    Thank you for referring your patient, Jarrod Lewis, to the [facility]. Please see a copy of my visit note below.    HPI:  Jarrod Lewis is a 75 year old male who is seen in consultation at the request of self.    Pt presents for eval of:   (Onset, Location, L/R, Character, Treatments, Injury if yes)    XR Right foot 6/24/2025     20-25 years ago, sharp edge of mower deck fell onto Right great toe, toenail avulsion and multiple avulsions over the years. Current toenail is thick, discolored, toenail deformity that causes intermittent discomfort.     Retired .    ROS:  10 point ROS neg other than the symptoms noted above in the HPI.    Patient Active Problem List   Diagnosis     CARDIOVASCULAR SCREENING; LDL GOAL LESS THAN 130     Reducible right inguinal hernia     GI bleed     Allergic rhinitis due to dust mite     Sinus pressure     Seasonal allergic rhinitis due to pollen     Allergic rhinitis     Penicillin allergy     Upper GI bleed     Fracture of bone adjacent to prosthesis     History of total knee arthroplasty, left     Painful orthopaedic hardware     Orthostatic hypotension     Mediastinal mass       PAST MEDICAL HISTORY:   Past Medical History:   Diagnosis Date     Arthritis      Duodenal ulcer      Gastric ulcer      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 01/02/2017    Left cheek        PAST SURGICAL HISTORY:   Past Surgical History:   Procedure Laterality Date     COLONOSCOPY  12/14/2012    Procedure: COLONOSCOPY;  Colonoscopy;  Surgeon: Sylvester Lucas MD;  Location:  GI     COLONOSCOPY N/A 10/26/2016    Procedure: COMBINED COLONOSCOPY, SINGLE OR MULTIPLE BIOPSY/POLYPECTOMY BY BIOPSY;  Surgeon: Trev Oquendo MD;  Location:  GI     ESOPHAGOSCOPY, GASTROSCOPY, DUODENOSCOPY (EGD), COMBINED N/A 4/24/2017    Procedure: COMBINED  ESOPHAGOSCOPY, GASTROSCOPY, DUODENOSCOPY (EGD), BIOPSY SINGLE OR MULTIPLE;  ESOPHAGOSCOPY, GASTROSCOPY, DUODENOSCOPY (EGD) with biopsies by forceps;  Surgeon: Puma Dominguez MD;  [facility]     ESOPHAGOSCOPY, GASTROSCOPY, DUODENOSCOPY (EGD), COMBINED N/A 6/12/2017    Procedure: COMBINED ESOPHAGOSCOPY, GASTROSCOPY, DUODENOSCOPY (EGD);  ESOPHAGOSCOPY, GASTROSCOPY, DUODENOSCOPY (EGD);  Surgeon: Sylvester Lucas MD;  [facility]     ESOPHAGOSCOPY, GASTROSCOPY, DUODENOSCOPY (EGD), COMBINED N/A 5/1/2019    Procedure: ESOPHAGOGASTRODUODENOSCOPY (EGD);  Surgeon: Artur Bruce MD;  Location: [facility]     HERNIORRHAPHY INGUINAL Right 3/8/2017    Procedure: HERNIORRHAPHY INGUINAL;  Surgeon: Kong Lassiter MD;  Location: [facility]     JOINT REPLACEMENT, HIP RT/LT Right 02/03/2012     MOHS MICROGRAPHIC PROCEDURE Left 01/02/2017    Left cheek     REMOVE HARDWARE KNEE Left 12/22/2023    Procedure: REMOVAL, screws from left  KNEE;  Surgeon: Puma Clemente MD;  Location:  OR     ZC TOTAL KNEE ARTHROPLASTY Bilateral         MEDICATIONS:   Current Outpatient Medications:      acetaminophen (TYLENOL) 325 MG tablet, Take 325-650 mg by mouth every 4 hours as needed for mild pain or fever Reported on 3/6/2017, Disp: 100 tablet, Rfl:      finasteride (PROSCAR) 5 MG tablet, Take 1 tablet (5 mg) by mouth daily., Disp: 90 tablet, Rfl: 3     fluticasone (FLONASE) 50 MCG/ACT nasal spray, Spray 1 spray into both nostrils daily, Disp: 16 g, Rfl: 3     loratadine (CLARITIN) 10 MG tablet, Take 10 mg by mouth daily, Disp: , Rfl:      phenazopyridine (PYRIDIUM) 100 MG tablet, Take 2 tablets (200 mg) by mouth 3 times daily as needed (AS NEEDED FOR IRRITIVE VOIDING SYMPTOMS AFTER THE CATHETER IS REMOVED). (Patient not taking: Reported on 6/24/2025), Disp: 60 tablet, Rfl: 0     ALLERGIES:    Allergies   Allergen Reactions     Dust Mites      Hydrocodone-Acetaminophen Other (See Comments)     nightmares     No Clinical Screening -  See Comments Other (See Comments)     Penicillins Unknown     As a child       Zithromax [Azithromycin Dihydrate]      diarrhea        SOCIAL HISTORY:   Social History     Socioeconomic History     Marital status: Single     Spouse name: Not on file     Number of children: Not on file     Years of education: Not on file     Highest education level: Not on file   Occupational History     Occupation:    Tobacco Use     Smoking status: Former     Types: Pipe     Quit date:      Years since quittin.4     Smokeless tobacco: Never   Vaping Use     Vaping status: Never Used   Substance and Sexual Activity     Alcohol use: Yes     Alcohol/week: 0.0 standard drinks of alcohol     Comment: occasional     Drug use: No     Sexual activity: Yes     Partners: Female   Other Topics Concern     Parent/sibling w/ CABG, MI or angioplasty before 65F 55M? Not Asked   Social History Narrative     Not on file     Social Drivers of Health     Financial Resource Strain: Low Risk  (2025)    Financial Resource Strain      Within the past 12 months, have you or your family members you live with been unable to get utilities (heat, electricity) when it was really needed?: No   Food Insecurity: Low Risk  (2025)    Food Insecurity      Within the past 12 months, did you worry that your food would run out before you got money to buy more?: No      Within the past 12 months, did the food you bought just not last and you didn t have money to get more?: No   Transportation Needs: Low Risk  (2025)    Transportation Needs      Within the past 12 months, has lack of transportation kept you from medical appointments, getting your medicines, non-medical meetings or appointments, work, or from getting things that you need?: No   Physical Activity: Unknown (2025)    Exercise Vital Sign      Days of Exercise per Week: 4 days      Minutes of Exercise per Session: Not on file   Stress: No Stress Concern Present  "(2025)    [employer] - Occupational Stress Questionnaire      Feeling of Stress : Only a little   Social Connections: Unknown (2025)    Social Connection and Isolation Panel [NHANES]      Frequency of Communication with Friends and Family: Not on file      Frequency of Social Gatherings with Friends and Family: Once a week      Attends Orthodox Services: Not on file      Active Member of Clubs or Organizations: Not on file      Attends Club or Organization Meetings: Not on file      Marital Status: Not on file   Interpersonal Safety: Low Risk  (2025)    Interpersonal Safety      Do you feel physically and emotionally safe where you currently live?: Yes      Within the past 12 months, have you been hit, slapped, kicked or otherwise physically hurt by someone?: No      Within the past 12 months, have you been humiliated or emotionally abused in other ways by your partner or ex-partner?: No   Housing Stability: Low Risk  (2025)    Housing Stability      Do you have housing? : Yes      Are you worried about losing your housing?: No        FAMILY HISTORY:   Family History   Problem Relation Age of Onset     Stomach Cancer Father          at age 46        EXAM:Vitals: Ht 1.854 m (6' 0.99\")   Wt 122 kg (269 lb)   BMI 35.50 kg/m    BMI= Body mass index is 35.5 kg/m .    General appearance: Patient is alert and fully cooperative with history & exam.  No sign of distress is noted during the visit.     Psychiatric: Affect is pleasant & appropriate.  Patient appears motivated to improve health.     Respiratory: Breathing is regular & unlabored while sitting.     HEENT: Hearing is intact to spoken word.  Speech is clear.  No gross evidence of visual impairment that would impact ambulation.     Vascular: DP & PT pulses are intact & regular bilaterally.  No significant edema or varicosities noted.  CFT and skin temperature is normal to both lower extremities.     Neurologic: " Lower extremity sensation is intact to light touch.  No evidence of weakness or contracture in the lower extremities.  No evidence of neuropathy.    Dermatologic: Skin is intact to both lower extremities with adequate texture, turgor and tone about the integument.  Right hallux nail is very dystrophic.  It was injured 25 years ago.  It is poorly attached to the nailbed there is malodor off-and-on drainage and discomfort.  Localized erythema is noted.    Musculoskeletal: Patient is ambulatory without assistive device or brace.  Gross hallux valgus with prominence of the medial first metatarsal head bilateral.     ASSESSMENT:       ICD-10-CM    1. Onychodystrophy  L60.3       2. Paronychia of great toe of right foot  L03.031            PLAN:  Reviewed patient's chart in [facility].      6/24/2025   Discussed treatment options with the patient.  Discussed debridement versus avulsion versus matrixectomy.  After discussion of these options the patient wishes to move forward with debridement.  The nail was debrided minimally to demonstrate appropriate technique to him and he was given written instructions on how to obtain appropriate instrumentation to do this on his own at home.  Also given written instructions for assistance with this from foot care certified nurses.    Was also given written instructions for permanent matrixectomy if this continues to be troublesome for him.      Artur Jung DPM      Again, thank you for allowing me to participate in the care of your patient.        Sincerely,        Artur Jung DPM    Electronically signed

## 2025-06-24 NOTE — PROGRESS NOTES
HPI:  Jarrod Lewis is a 75 year old male who is seen in consultation at the request of self.    Pt presents for eval of:   (Onset, Location, L/R, Character, Treatments, Injury if yes)    XR Right foot 6/24/2025     20-25 years ago, sharp edge of mower deck fell onto Right great toe, toenail avulsion and multiple avulsions over the years. Current toenail is thick, discolored, toenail deformity that causes intermittent discomfort.     Retired .    ROS:  10 point ROS neg other than the symptoms noted above in the HPI.    Patient Active Problem List   Diagnosis    CARDIOVASCULAR SCREENING; LDL GOAL LESS THAN 130    Reducible right inguinal hernia    GI bleed    Allergic rhinitis due to dust mite    Sinus pressure    Seasonal allergic rhinitis due to pollen    Allergic rhinitis    Penicillin allergy    Upper GI bleed    Fracture of bone adjacent to prosthesis    History of total knee arthroplasty, left    Painful orthopaedic hardware    Orthostatic hypotension    Mediastinal mass       PAST MEDICAL HISTORY:   Past Medical History:   Diagnosis Date    Arthritis     Duodenal ulcer     Gastric ulcer     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 01/02/2017    Left cheek        PAST SURGICAL HISTORY:   Past Surgical History:   Procedure Laterality Date    COLONOSCOPY  12/14/2012    Procedure: COLONOSCOPY;  Colonoscopy;  Surgeon: Sylvester Lucas MD;  [facility]    COLONOSCOPY N/A 10/26/2016    Procedure: COMBINED COLONOSCOPY, SINGLE OR MULTIPLE BIOPSY/POLYPECTOMY BY BIOPSY;  Surgeon: Trev Oquendo MD;  Location:  GI    ESOPHAGOSCOPY, GASTROSCOPY, DUODENOSCOPY (EGD), COMBINED N/A 4/24/2017    Procedure: COMBINED ESOPHAGOSCOPY, GASTROSCOPY, DUODENOSCOPY (EGD), BIOPSY SINGLE OR MULTIPLE;  ESOPHAGOSCOPY, GASTROSCOPY, DUODENOSCOPY (EGD) with biopsies by forceps;  Surgeon: Puma Dominguez MD;  Location:  GI    ESOPHAGOSCOPY, GASTROSCOPY, DUODENOSCOPY (EGD), COMBINED N/A  6/12/2017    Procedure: COMBINED ESOPHAGOSCOPY, GASTROSCOPY, DUODENOSCOPY (EGD);  ESOPHAGOSCOPY, GASTROSCOPY, DUODENOSCOPY (EGD);  Surgeon: Sylvester Lucas MD;  [facility]    ESOPHAGOSCOPY, GASTROSCOPY, DUODENOSCOPY (EGD), COMBINED N/A 5/1/2019    Procedure: ESOPHAGOGASTRODUODENOSCOPY (EGD);  Surgeon: Artur Bruce MD;  Location: [facility]    HERNIORRHAPHY INGUINAL Right 3/8/2017    Procedure: HERNIORRHAPHY INGUINAL;  Surgeon: Kong Lassiter MD;  [facility]    JOINT REPLACEMENT, HIP RT/LT Right 02/03/2012    MOHS MICROGRAPHIC PROCEDURE Left 01/02/2017    Left cheek    REMOVE HARDWARE KNEE Left 12/22/2023    Procedure: REMOVAL, screws from left  KNEE;  Surgeon: Puma Clemente MD;  Location: [facility]    ZZC TOTAL KNEE ARTHROPLASTY Bilateral         MEDICATIONS:   Current Outpatient Medications:     acetaminophen (TYLENOL) 325 MG tablet, Take 325-650 mg by mouth every 4 hours as needed for mild pain or fever Reported on 3/6/2017, Disp: 100 tablet, Rfl:     finasteride (PROSCAR) 5 MG tablet, Take 1 tablet (5 mg) by mouth daily., Disp: 90 tablet, Rfl: 3    fluticasone (FLONASE) 50 MCG/ACT nasal spray, Spray 1 spray into both nostrils daily, Disp: 16 g, Rfl: 3    loratadine (CLARITIN) 10 MG tablet, Take 10 mg by mouth daily, Disp: , Rfl:     phenazopyridine (PYRIDIUM) 100 MG tablet, Take 2 tablets (200 mg) by mouth 3 times daily as needed (AS NEEDED FOR IRRITIVE VOIDING SYMPTOMS AFTER THE CATHETER IS REMOVED). (Patient not taking: Reported on 6/24/2025), Disp: 60 tablet, Rfl: 0     ALLERGIES:    Allergies   Allergen Reactions    Dust Mites     Hydrocodone-Acetaminophen Other (See Comments)     nightmares    No Clinical Screening - See Comments Other (See Comments)    Penicillins Unknown     As a child      Zithromax [Azithromycin Dihydrate]      diarrhea        SOCIAL HISTORY:   Social History     Socioeconomic History    Marital status: Single     Spouse name: Not on file    Number of children: Not on  file    Years of education: Not on file    Highest education level: Not on file   Occupational History    Occupation:    Tobacco Use    Smoking status: Former     Types: Pipe     Quit date:      Years since quittin.4    Smokeless tobacco: Never   Vaping Use    Vaping status: Never Used   Substance and Sexual Activity    Alcohol use: Yes     Alcohol/week: 0.0 standard drinks of alcohol     Comment: occasional    Drug use: No    Sexual activity: Yes     Partners: Female   Other Topics Concern    Parent/sibling w/ CABG, MI or angioplasty before 65F 55M? Not Asked   Social History Narrative    Not on file     Social Drivers of Health     Financial Resource Strain: Low Risk  (2025)    Financial Resource Strain     Within the past 12 months, have you or your family members you live with been unable to get utilities (heat, electricity) when it was really needed?: No   Food Insecurity: Low Risk  (2025)    Food Insecurity     Within the past 12 months, did you worry that your food would run out before you got money to buy more?: No     Within the past 12 months, did the food you bought just not last and you didn t have money to get more?: No   Transportation Needs: Low Risk  (2025)    Transportation Needs     Within the past 12 months, has lack of transportation kept you from medical appointments, getting your medicines, non-medical meetings or appointments, work, or from getting things that you need?: No   Physical Activity: Unknown (2025)    Exercise Vital Sign     Days of Exercise per Week: 4 days     Minutes of Exercise per Session: Not on file   Stress: No Stress Concern Present (2025)    [employer] - Occupational Stress Questionnaire     Feeling of Stress : Only a little   Social Connections: Unknown (2025)    Social Connection and Isolation Panel [NHANES]     Frequency of Communication with Friends and Family: Not on file     Frequency of  "Social Gatherings with Friends and Family: Once a week     Attends Caodaism Services: Not on file     Active Member of Clubs or Organizations: Not on file     Attends Club or Organization Meetings: Not on file     Marital Status: Not on file   Interpersonal Safety: Low Risk  (2025)    Interpersonal Safety     Do you feel physically and emotionally safe where you currently live?: Yes     Within the past 12 months, have you been hit, slapped, kicked or otherwise physically hurt by someone?: No     Within the past 12 months, have you been humiliated or emotionally abused in other ways by your partner or ex-partner?: No   Housing Stability: Low Risk  (2025)    Housing Stability     Do you have housing? : Yes     Are you worried about losing your housing?: No        FAMILY HISTORY:   Family History   Problem Relation Age of Onset    Stomach Cancer Father          at age 46        EXAM:Vitals: Ht 1.854 m (6' 0.99\")   Wt 122 kg (269 lb)   BMI 35.50 kg/m    BMI= Body mass index is 35.5 kg/m .    General appearance: Patient is alert and fully cooperative with history & exam.  No sign of distress is noted during the visit.     Psychiatric: Affect is pleasant & appropriate.  Patient appears motivated to improve health.     Respiratory: Breathing is regular & unlabored while sitting.     HEENT: Hearing is intact to spoken word.  Speech is clear.  No gross evidence of visual impairment that would impact ambulation.     Vascular: DP & PT pulses are intact & regular bilaterally.  No significant edema or varicosities noted.  CFT and skin temperature is normal to both lower extremities.     Neurologic: Lower extremity sensation is intact to light touch.  No evidence of weakness or contracture in the lower extremities.  No evidence of neuropathy.    Dermatologic: Skin is intact to both lower extremities with adequate texture, turgor and tone about the integument.  Right hallux nail is very dystrophic.  It was injured " 25 years ago.  It is poorly attached to the nailbed there is malodor off-and-on drainage and discomfort.  Localized erythema is noted.    Musculoskeletal: Patient is ambulatory without assistive device or brace.  Gross hallux valgus with prominence of the medial first metatarsal head bilateral.     ASSESSMENT:       ICD-10-CM    1. Onychodystrophy  L60.3       2. Paronychia of great toe of right foot  L03.031            PLAN:  Reviewed patient's chart in [facility].      6/24/2025   Discussed treatment options with the patient.  Discussed debridement versus avulsion versus matrixectomy.  After discussion of these options the patient wishes to move forward with debridement.  The nail was debrided minimally to demonstrate appropriate technique to him and he was given written instructions on how to obtain appropriate instrumentation to do this on his own at home.  Also given written instructions for assistance with this from foot care certified nurses.    Was also given written instructions for permanent matrixectomy if this continues to be troublesome for him.      Artur Jung DPM

## (undated) DEVICE — Device

## (undated) DEVICE — PACK EXTREMITY SOP15EXFSD

## (undated) DEVICE — CAST PADDING 4" UNSTERILE 9044

## (undated) DEVICE — PREP CHLORAPREP 26ML TINTED ORANGE  260815

## (undated) DEVICE — DRSG STERI STRIP 1/2X4" R1547

## (undated) DEVICE — GLOVE BIOGEL PI MICRO INDICATOR UNDERGLOVE SZ 7.5 48975

## (undated) DEVICE — GLOVE BIOGEL PI MICRO INDICATOR UNDERGLOVE SZ 8.0 48980

## (undated) DEVICE — DRAIN PENROSE 1/2X18" SIL

## (undated) DEVICE — SU VICRYL 3-0 SH 27" UND J416H

## (undated) DEVICE — SU SILK 3-0 TIE 24" SA74H

## (undated) DEVICE — DRSG PRIMAPORE 03 1/8X6" 66000318

## (undated) DEVICE — SU PROLENE 0 CT-1 30" 8424H

## (undated) DEVICE — DRAPE LAP TRANSVERSE 29421

## (undated) DEVICE — SPONGE KITTNER 31001010

## (undated) DEVICE — GLOVE PROTEXIS W/NEU-THERA 6.5  2D73TE65

## (undated) DEVICE — DRAPE STOCKINETTE IMPERVIOUS 12" 1587

## (undated) DEVICE — SOL WATER IRRIG 1000ML BOTTLE 07139-09

## (undated) DEVICE — PACK MINOR PROCEDURE CUSTOM

## (undated) DEVICE — SU VICRYL 2-0 CT-2 27" J333H

## (undated) DEVICE — STOCKING SLEEVE COMPRESSION CALF MED

## (undated) DEVICE — DRSG GAUZE 4X4" TRAY

## (undated) DEVICE — DRAPE IOBAN INCISE 23X17" 6650EZ

## (undated) DEVICE — DRSG AQUACEL AG ADV 3.5X4" 422603

## (undated) DEVICE — GLOVE BIOGEL PI ULTRATOUCH G SZ 7.5 42175

## (undated) DEVICE — GLOVE BIOGEL 6.5 LATEX

## (undated) DEVICE — SU MONOCRYL 4-0 PS-2 18" UND Y496G

## (undated) DEVICE — SOL ADH LIQUID BENZOIN SWAB 0.6ML C1544

## (undated) RX ORDER — BUPIVACAINE HYDROCHLORIDE 5 MG/ML
INJECTION, SOLUTION EPIDURAL; INTRACAUDAL
Status: DISPENSED
Start: 2023-12-22

## (undated) RX ORDER — LIDOCAINE HYDROCHLORIDE 20 MG/ML
INJECTION, SOLUTION EPIDURAL; INFILTRATION; INTRACAUDAL; PERINEURAL
Status: DISPENSED
Start: 2017-03-08

## (undated) RX ORDER — EPHEDRINE SULFATE 50 MG/ML
INJECTION, SOLUTION INTRAMUSCULAR; INTRAVENOUS; SUBCUTANEOUS
Status: DISPENSED
Start: 2017-03-08

## (undated) RX ORDER — PROPOFOL 10 MG/ML
INJECTION, EMULSION INTRAVENOUS
Status: DISPENSED
Start: 2023-12-22

## (undated) RX ORDER — LIDOCAINE HYDROCHLORIDE 10 MG/ML
INJECTION, SOLUTION EPIDURAL; INFILTRATION; INTRACAUDAL; PERINEURAL
Status: DISPENSED
Start: 2023-12-22

## (undated) RX ORDER — ONDANSETRON 2 MG/ML
INJECTION INTRAMUSCULAR; INTRAVENOUS
Status: DISPENSED
Start: 2017-03-08

## (undated) RX ORDER — BUPIVACAINE HYDROCHLORIDE AND EPINEPHRINE 2.5; 5 MG/ML; UG/ML
INJECTION, SOLUTION EPIDURAL; INFILTRATION; INTRACAUDAL; PERINEURAL
Status: DISPENSED
Start: 2017-03-08

## (undated) RX ORDER — FENTANYL CITRATE 50 UG/ML
INJECTION, SOLUTION INTRAMUSCULAR; INTRAVENOUS
Status: DISPENSED
Start: 2017-04-24

## (undated) RX ORDER — KETOROLAC TROMETHAMINE 30 MG/ML
INJECTION, SOLUTION INTRAMUSCULAR; INTRAVENOUS
Status: DISPENSED
Start: 2017-03-08

## (undated) RX ORDER — ONDANSETRON 2 MG/ML
INJECTION INTRAMUSCULAR; INTRAVENOUS
Status: DISPENSED
Start: 2023-12-22

## (undated) RX ORDER — FENTANYL CITRATE 50 UG/ML
INJECTION, SOLUTION INTRAMUSCULAR; INTRAVENOUS
Status: DISPENSED
Start: 2017-06-12

## (undated) RX ORDER — FENTANYL CITRATE 50 UG/ML
INJECTION, SOLUTION INTRAMUSCULAR; INTRAVENOUS
Status: DISPENSED
Start: 2017-03-08

## (undated) RX ORDER — GLYCOPYRROLATE 0.2 MG/ML
INJECTION INTRAMUSCULAR; INTRAVENOUS
Status: DISPENSED
Start: 2017-03-08

## (undated) RX ORDER — PROPOFOL 10 MG/ML
INJECTION, EMULSION INTRAVENOUS
Status: DISPENSED
Start: 2017-03-08

## (undated) RX ORDER — FENTANYL CITRATE 50 UG/ML
INJECTION, SOLUTION INTRAMUSCULAR; INTRAVENOUS
Status: DISPENSED
Start: 2023-12-22

## (undated) RX ORDER — DEXAMETHASONE SODIUM PHOSPHATE 10 MG/ML
INJECTION INTRAMUSCULAR; INTRAVENOUS
Status: DISPENSED
Start: 2017-03-08